# Patient Record
Sex: MALE | Race: WHITE | NOT HISPANIC OR LATINO
[De-identification: names, ages, dates, MRNs, and addresses within clinical notes are randomized per-mention and may not be internally consistent; named-entity substitution may affect disease eponyms.]

---

## 2017-01-09 ENCOUNTER — APPOINTMENT (OUTPATIENT)
Dept: OTOLARYNGOLOGY | Facility: CLINIC | Age: 72
End: 2017-01-09

## 2017-01-09 ENCOUNTER — APPOINTMENT (OUTPATIENT)
Dept: RADIATION ONCOLOGY | Facility: CLINIC | Age: 72
End: 2017-01-09

## 2017-01-09 VITALS
DIASTOLIC BLOOD PRESSURE: 68 MMHG | RESPIRATION RATE: 14 BRPM | HEART RATE: 60 BPM | OXYGEN SATURATION: 100 % | WEIGHT: 176 LBS | SYSTOLIC BLOOD PRESSURE: 139 MMHG | BODY MASS INDEX: 27.57 KG/M2

## 2017-01-09 DIAGNOSIS — Z78.9 OTHER SPECIFIED HEALTH STATUS: ICD-10-CM

## 2017-01-15 ENCOUNTER — EMERGENCY (EMERGENCY)
Facility: HOSPITAL | Age: 72
LOS: 1 days | Discharge: HOME | End: 2017-01-15
Attending: EMERGENCY MEDICINE | Admitting: EMERGENCY MEDICINE
Payer: MEDICARE

## 2017-01-15 VITALS
SYSTOLIC BLOOD PRESSURE: 123 MMHG | RESPIRATION RATE: 18 BRPM | OXYGEN SATURATION: 99 % | TEMPERATURE: 98 F | HEART RATE: 64 BPM | DIASTOLIC BLOOD PRESSURE: 51 MMHG

## 2017-01-15 VITALS
HEART RATE: 64 BPM | OXYGEN SATURATION: 97 % | WEIGHT: 177.03 LBS | RESPIRATION RATE: 18 BRPM | HEIGHT: 66 IN | DIASTOLIC BLOOD PRESSURE: 62 MMHG | TEMPERATURE: 98 F | SYSTOLIC BLOOD PRESSURE: 128 MMHG

## 2017-01-15 DIAGNOSIS — Z95.0 PRESENCE OF CARDIAC PACEMAKER: Chronic | ICD-10-CM

## 2017-01-15 DIAGNOSIS — E11.9 TYPE 2 DIABETES MELLITUS WITHOUT COMPLICATIONS: ICD-10-CM

## 2017-01-15 DIAGNOSIS — Z88.0 ALLERGY STATUS TO PENICILLIN: ICD-10-CM

## 2017-01-15 DIAGNOSIS — E86.0 DEHYDRATION: ICD-10-CM

## 2017-01-15 DIAGNOSIS — Z88.2 ALLERGY STATUS TO SULFONAMIDES: ICD-10-CM

## 2017-01-15 DIAGNOSIS — I10 ESSENTIAL (PRIMARY) HYPERTENSION: ICD-10-CM

## 2017-01-15 DIAGNOSIS — Z98.890 OTHER SPECIFIED POSTPROCEDURAL STATES: Chronic | ICD-10-CM

## 2017-01-15 LAB
CK MB CFR SERPL CALC: <1 NG/ML — SIGNIFICANT CHANGE UP (ref 0.5–3.6)
CK SERPL-CCNC: 84 U/L — SIGNIFICANT CHANGE UP (ref 39–308)
TROPONIN I SERPL-MCNC: <0.015 NG/ML — SIGNIFICANT CHANGE UP (ref 0.01–0.04)

## 2017-01-15 PROCEDURE — 70450 CT HEAD/BRAIN W/O DYE: CPT | Mod: 26

## 2017-01-15 PROCEDURE — 85730 THROMBOPLASTIN TIME PARTIAL: CPT

## 2017-01-15 PROCEDURE — 82553 CREATINE MB FRACTION: CPT

## 2017-01-15 PROCEDURE — 93010 ELECTROCARDIOGRAM REPORT: CPT

## 2017-01-15 PROCEDURE — 70481 CT ORBIT/EAR/FOSSA W/DYE: CPT

## 2017-01-15 PROCEDURE — 70481 CT ORBIT/EAR/FOSSA W/DYE: CPT | Mod: 26,59

## 2017-01-15 PROCEDURE — 70450 CT HEAD/BRAIN W/O DYE: CPT

## 2017-01-15 PROCEDURE — 99285 EMERGENCY DEPT VISIT HI MDM: CPT | Mod: 25

## 2017-01-15 PROCEDURE — 85610 PROTHROMBIN TIME: CPT

## 2017-01-15 PROCEDURE — 84484 ASSAY OF TROPONIN QUANT: CPT

## 2017-01-15 PROCEDURE — 36415 COLL VENOUS BLD VENIPUNCTURE: CPT

## 2017-01-15 PROCEDURE — 80053 COMPREHEN METABOLIC PANEL: CPT

## 2017-01-15 PROCEDURE — 99284 EMERGENCY DEPT VISIT MOD MDM: CPT | Mod: 25

## 2017-01-15 PROCEDURE — 85025 COMPLETE CBC W/AUTO DIFF WBC: CPT

## 2017-01-15 PROCEDURE — 82550 ASSAY OF CK (CPK): CPT

## 2017-01-15 PROCEDURE — 93005 ELECTROCARDIOGRAM TRACING: CPT

## 2017-01-15 RX ORDER — SODIUM CHLORIDE 9 MG/ML
1000 INJECTION INTRAMUSCULAR; INTRAVENOUS; SUBCUTANEOUS ONCE
Qty: 0 | Refills: 0 | Status: COMPLETED | OUTPATIENT
Start: 2017-01-15 | End: 2017-01-15

## 2017-01-15 RX ADMIN — SODIUM CHLORIDE 2000 MILLILITER(S): 9 INJECTION INTRAMUSCULAR; INTRAVENOUS; SUBCUTANEOUS at 14:00

## 2017-01-15 NOTE — CONSULT NOTE ADULT - SUBJECTIVE AND OBJECTIVE BOX
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 Laguna and Dr. Rodriguez who p/w episode of dizziness this am, felt like he was off balance, no syncope, no n/v. Last seen by Dr. Laguna on 1/9. Patient states that this has happened to him in the past and he has come to the ER for dehydration with resolution of his symptoms. He hasn't eaten in over 24 hours (usually eats once daily) and was feeling very weak and lightheaded. Works out several times a week. Denies vertigo, otalgia, otorrhea. No changes in hearing. Denies positional dizziness/vertigo. Was fluid resuscitated in the ER with 1L NS with resolution of his symptoms.     Allergies    penicillin (Unknown)  sulfa drugs (Unknown)    Intolerances        PAST MEDICAL & SURGICAL HISTORY:  Brain tumor  Artificial cardiac pacemaker  History of open heart surgery      SOCIAL HISTORY:  Tobacco History:  ETOH Use:   Drug Use:     FAMILY HISTORY:        MEDICATIONS:    Vital Signs Last 24 Hrs  T(C): 37.1, Max: 37.1 (01-15 @ 14:34)  T(F): 98.8, Max: 98.8 (01-15 @ 14:34)  HR: 68 (63 - 68)  BP: 132/70 (114/56 - 132/70)  BP(mean): --  RR: 18 (18 - 18)  SpO2: 98% (97% - 99%)    LABS:  CBC-    15 Fernando 2017 13:00    141    |  105    |  11     ----------------------------<  125    4.1     |  27     |  0.60     Ca    8.5        15 Fernando 2017 13:00    TPro  7.0    /  Alb  3.7    /  TBili  0.9    /  DBili  x      /  AST  24     /  ALT  28     /  AlkPhos  75     15 Fernando 2017 13:00    Coagulation Studies-  PT/INR - ( 15 Fernando 2017 13:00 )   PT: 29.6 sec;   INR: 2.64          PTT - ( 15 Fernando 2017 13:00 )  PTT:43.1 sec  Endocrine Panel-  --  --  8.5 mg/dL        PHYSICAL EXAM:    ENT EXAM-   NAD  Constitutional: Well-developed, well-nourished.  well appearing  face grossly intact  Head:  normocephalic, atraumatic.   Ears:  AU Ear canals clear.  Tympanic membranes intact; no effusion or retraction.  Eyes: EOMI, PERRL, no nystagmus   Pulm:  No dyspnea, non-labored breathing      RADIOLOGY & ADDITIONAL STUDIES:  Compared with 10/19/2016, there is no significant change in size or   distribution of known right glomus jugulare tumor, with erosion of the   medial hypotympanum as well as the posterior semicircular canal with both   intracranial and extracranial extension.    There is new partial right mastoid and tympanic cavity soft tissue which   is nonspecific, possibly inflammatory.

## 2017-01-15 NOTE — CONSULT NOTE ADULT - ASSESSMENT
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 Laguna and Dr. Rodriguez who p/w episode of dizziness this am. Now with resolution of symptoms after 1L NS.  -No acute ENT intervention.   -F/u with Dr. Laguna as originally planned sooner with any new concerns  -counselled on eating small meals throughout the day and staying hydrated to avoid dehydration    [facility] at [phone] with any questions/concerns.

## 2017-01-15 NOTE — ED ADULT NURSE NOTE - OBJECTIVE STATEMENT
Pt presents to ED c/o dizziness and nausea starting 1 hour ago. Pt states he has hx of a brain tumor dx 3 mo ago, last radiation 6 weeks ago. Pt denies CP/SOB, palpitations, fevers, v/d. Pt speaking in complete sentences, lungs clear to auscultation. Neuro intact, no arm/leg drift, no facial droop. Abdomen soft, nontende, bowel sounds present in all 4 quadrants. NAD noted.

## 2017-01-15 NOTE — ED PROVIDER NOTE - CONDUCTED A DETAILED DISCUSSION WITH PATIENT AND/OR GUARDIAN REGARDING, MDM
radiology results/need for outpatient follow-up/lab results/return to ED if symptoms worsen, persist or questions arise

## 2017-01-15 NOTE — ED PROVIDER NOTE - NEUROLOGICAL, MLM
Alert and oriented x 3. Cn 2-12 intact. Strength 5/5 and sensation intact in all 4 extremities. no pronator drift. FTN normal. Neg Rhomberg. Gait normal.

## 2017-01-15 NOTE — ED PROVIDER NOTE - MEDICAL DECISION MAKING DETAILS
71M with above PMHx including recently dx brain tumor who p/w dizziness, no resolved. pt with stable VS but appears dehydrated, will check labs, CT head WO, CT IAC W, and hydrate. Dispo pending w/u.

## 2017-01-15 NOTE — ED ADULT NURSE NOTE - CHPI ED SYMPTOMS NEG
no decreased eating/drinking/no weakness/no tingling/no numbness/no vomiting/no pain/no chills/no fever

## 2017-01-15 NOTE — ED PROVIDER NOTE - OBJECTIVE STATEMENT
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 Laguna (ENT) and Dr. 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 Laguna (ENT) and Dr. Rodriguez ([facility]) who p/w episode of dizziness this am, felt like he was off balance, no syncope, no n/v, no CP/SOB, no n/t/w in ext. Pt admits he has not been staying hydrated lately.

## 2017-01-15 NOTE — ED PROVIDER NOTE - PROGRESS NOTE DETAILS
Pt feels much better after iVFs, ambulatory no neuro deficits. CTs unremarkable, mass shows no change. pt requests c/s with Dr. Rodriguez - she was paged but have not heard back fro her yet. pt will f/u with his doctors tomorrow or return for any concerning or worsening sx.

## 2017-01-17 ENCOUNTER — APPOINTMENT (OUTPATIENT)
Dept: OPHTHALMOLOGY | Facility: CLINIC | Age: 72
End: 2017-01-17

## 2017-01-27 ENCOUNTER — CLINICAL ADVICE (OUTPATIENT)
Age: 72
End: 2017-01-27

## 2017-02-16 PROBLEM — D49.6 NEOPLASM OF UNSPECIFIED BEHAVIOR OF BRAIN: Chronic | Status: ACTIVE | Noted: 2017-01-15

## 2017-03-03 ENCOUNTER — APPOINTMENT (OUTPATIENT)
Dept: OTOLARYNGOLOGY | Facility: CLINIC | Age: 72
End: 2017-03-03

## 2017-03-03 ENCOUNTER — APPOINTMENT (OUTPATIENT)
Dept: RADIATION ONCOLOGY | Facility: CLINIC | Age: 72
End: 2017-03-03

## 2017-03-03 VITALS
RESPIRATION RATE: 14 BRPM | SYSTOLIC BLOOD PRESSURE: 141 MMHG | DIASTOLIC BLOOD PRESSURE: 77 MMHG | BODY MASS INDEX: 26.78 KG/M2 | WEIGHT: 171 LBS | HEART RATE: 68 BPM | OXYGEN SATURATION: 100 %

## 2017-03-06 ENCOUNTER — CLINICAL ADVICE (OUTPATIENT)
Age: 72
End: 2017-03-06

## 2017-03-14 ENCOUNTER — FORM ENCOUNTER (OUTPATIENT)
Age: 72
End: 2017-03-14

## 2017-03-15 ENCOUNTER — OUTPATIENT (OUTPATIENT)
Dept: OUTPATIENT SERVICES | Facility: HOSPITAL | Age: 72
LOS: 1 days | End: 2017-03-15
Payer: MEDICARE

## 2017-03-15 DIAGNOSIS — Z98.890 OTHER SPECIFIED POSTPROCEDURAL STATES: Chronic | ICD-10-CM

## 2017-03-15 DIAGNOSIS — Z95.0 PRESENCE OF CARDIAC PACEMAKER: Chronic | ICD-10-CM

## 2017-03-15 PROCEDURE — 70481 CT ORBIT/EAR/FOSSA W/DYE: CPT | Mod: 26

## 2017-03-15 PROCEDURE — 70481 CT ORBIT/EAR/FOSSA W/DYE: CPT

## 2017-03-24 ENCOUNTER — APPOINTMENT (OUTPATIENT)
Dept: OTOLARYNGOLOGY | Facility: CLINIC | Age: 72
End: 2017-03-24

## 2017-03-24 VITALS
HEART RATE: 61 BPM | DIASTOLIC BLOOD PRESSURE: 60 MMHG | HEIGHT: 67 IN | SYSTOLIC BLOOD PRESSURE: 124 MMHG | WEIGHT: 172 LBS | BODY MASS INDEX: 27 KG/M2

## 2017-03-24 DIAGNOSIS — H90.3 SENSORINEURAL HEARING LOSS, BILATERAL: ICD-10-CM

## 2017-03-24 DIAGNOSIS — R49.8 OTHER VOICE AND RESONANCE DISORDERS: ICD-10-CM

## 2017-03-24 DIAGNOSIS — K21.0 GASTRO-ESOPHAGEAL REFLUX DISEASE WITH ESOPHAGITIS: ICD-10-CM

## 2017-03-24 DIAGNOSIS — N40.0 BENIGN PROSTATIC HYPERPLASIA WITHOUT LOWER URINARY TRACT SYMPMS: ICD-10-CM

## 2017-03-24 DIAGNOSIS — G47.33 OBSTRUCTIVE SLEEP APNEA (ADULT) (PEDIATRIC): ICD-10-CM

## 2017-03-24 DIAGNOSIS — H40.9 UNSPECIFIED GLAUCOMA: ICD-10-CM

## 2017-03-27 PROBLEM — N40.0 BENIGN HYPERTROPHY OF PROSTATE: Status: ACTIVE | Noted: 2017-03-27

## 2017-03-27 PROBLEM — R49.8 OTHER VOICE AND RESONANCE DISORDERS: Status: ACTIVE | Noted: 2017-03-27

## 2017-03-27 PROBLEM — H90.3 BILATERAL SENSORINEURAL HEARING LOSS: Status: ACTIVE | Noted: 2017-03-27

## 2017-03-27 PROBLEM — G47.33 OBSTRUCTIVE SLEEP APNEA: Status: ACTIVE | Noted: 2017-03-27

## 2017-03-27 PROBLEM — H40.9 GLAUCOMA: Status: ACTIVE | Noted: 2017-03-27

## 2017-03-27 PROBLEM — K21.0 CHRONIC REFLUX ESOPHAGITIS: Status: ACTIVE | Noted: 2017-03-27

## 2017-03-28 ENCOUNTER — APPOINTMENT (OUTPATIENT)
Dept: RADIATION ONCOLOGY | Facility: CLINIC | Age: 72
End: 2017-03-28

## 2017-03-28 VITALS
BODY MASS INDEX: 27.31 KG/M2 | SYSTOLIC BLOOD PRESSURE: 150 MMHG | HEIGHT: 67 IN | WEIGHT: 174 LBS | DIASTOLIC BLOOD PRESSURE: 79 MMHG | HEART RATE: 69 BPM | OXYGEN SATURATION: 99 %

## 2017-03-28 DIAGNOSIS — H93.11 TINNITUS, RIGHT EAR: ICD-10-CM

## 2017-04-27 ENCOUNTER — OUTPATIENT (OUTPATIENT)
Dept: OUTPATIENT SERVICES | Facility: HOSPITAL | Age: 72
LOS: 1 days | Discharge: HOME | End: 2017-04-27
Payer: MEDICARE

## 2017-04-27 DIAGNOSIS — Z98.890 OTHER SPECIFIED POSTPROCEDURAL STATES: Chronic | ICD-10-CM

## 2017-04-27 DIAGNOSIS — I48.91 UNSPECIFIED ATRIAL FIBRILLATION: ICD-10-CM

## 2017-04-27 DIAGNOSIS — I44.2 ATRIOVENTRICULAR BLOCK, COMPLETE: ICD-10-CM

## 2017-04-27 DIAGNOSIS — Z95.0 PRESENCE OF CARDIAC PACEMAKER: Chronic | ICD-10-CM

## 2017-04-27 DIAGNOSIS — Z79.01 LONG TERM (CURRENT) USE OF ANTICOAGULANTS: ICD-10-CM

## 2017-04-27 DIAGNOSIS — Z45.010 ENCOUNTER FOR CHECKING AND TESTING OF CARDIAC PACEMAKER PULSE GENERATOR [BATTERY]: ICD-10-CM

## 2017-04-27 PROCEDURE — C1786: CPT

## 2017-04-27 PROCEDURE — 33227 REMOVE&REPLACE PM GEN SINGL: CPT | Mod: KX

## 2017-04-27 NOTE — PROGRESS NOTE ADULT - SUBJECTIVE AND OBJECTIVE BOX
EPS Progress Note    S: 70 yo m with history of a.fib. brain tumor, s/p ppm for CHB presents for ppm generator change.  Patient denies chest pain, SOB, palpitations,     MEDICATIONS    Metoprolol, finasteride, digoxin, coumadin, doxazosin, omeprazole, pantoprazole.             General:  NAD         Chest:  CTA B/L         Cardiac:       s1/s2        Irregular      Abdomen:  +BS      Soft      Non-Tender      Non-Distended         Extremities:  LE Edema      Distal Pulses Intact                                        Assessment/Plan:    70 yo m with history of a.fib. brain tumor, s/p ppm for CHB presents for ppm generator change.  will discharge home today if stable.

## 2017-06-17 ENCOUNTER — EMERGENCY (EMERGENCY)
Facility: HOSPITAL | Age: 72
LOS: 1 days | Discharge: HOME | End: 2017-06-17
Attending: EMERGENCY MEDICINE | Admitting: EMERGENCY MEDICINE
Payer: MEDICARE

## 2017-06-17 VITALS
HEART RATE: 64 BPM | OXYGEN SATURATION: 95 % | TEMPERATURE: 98 F | SYSTOLIC BLOOD PRESSURE: 136 MMHG | DIASTOLIC BLOOD PRESSURE: 74 MMHG | RESPIRATION RATE: 18 BRPM

## 2017-06-17 VITALS
DIASTOLIC BLOOD PRESSURE: 69 MMHG | RESPIRATION RATE: 18 BRPM | HEIGHT: 67 IN | OXYGEN SATURATION: 96 % | TEMPERATURE: 99 F | HEART RATE: 62 BPM | SYSTOLIC BLOOD PRESSURE: 137 MMHG | WEIGHT: 169.98 LBS

## 2017-06-17 DIAGNOSIS — Z88.2 ALLERGY STATUS TO SULFONAMIDES: ICD-10-CM

## 2017-06-17 DIAGNOSIS — R42 DIZZINESS AND GIDDINESS: ICD-10-CM

## 2017-06-17 DIAGNOSIS — I10 ESSENTIAL (PRIMARY) HYPERTENSION: ICD-10-CM

## 2017-06-17 DIAGNOSIS — Z95.0 PRESENCE OF CARDIAC PACEMAKER: Chronic | ICD-10-CM

## 2017-06-17 DIAGNOSIS — Z88.0 ALLERGY STATUS TO PENICILLIN: ICD-10-CM

## 2017-06-17 DIAGNOSIS — Z98.890 OTHER SPECIFIED POSTPROCEDURAL STATES: Chronic | ICD-10-CM

## 2017-06-17 PROCEDURE — 70498 CT ANGIOGRAPHY NECK: CPT

## 2017-06-17 PROCEDURE — 96374 THER/PROPH/DIAG INJ IV PUSH: CPT | Mod: XU

## 2017-06-17 PROCEDURE — 70498 CT ANGIOGRAPHY NECK: CPT | Mod: 26

## 2017-06-17 PROCEDURE — 99285 EMERGENCY DEPT VISIT HI MDM: CPT | Mod: 25

## 2017-06-17 PROCEDURE — 82550 ASSAY OF CK (CPK): CPT

## 2017-06-17 PROCEDURE — 70496 CT ANGIOGRAPHY HEAD: CPT | Mod: 26

## 2017-06-17 PROCEDURE — 36415 COLL VENOUS BLD VENIPUNCTURE: CPT

## 2017-06-17 PROCEDURE — 99284 EMERGENCY DEPT VISIT MOD MDM: CPT | Mod: 25

## 2017-06-17 PROCEDURE — 93005 ELECTROCARDIOGRAM TRACING: CPT

## 2017-06-17 PROCEDURE — 85730 THROMBOPLASTIN TIME PARTIAL: CPT

## 2017-06-17 PROCEDURE — 93010 ELECTROCARDIOGRAM REPORT: CPT

## 2017-06-17 PROCEDURE — 80053 COMPREHEN METABOLIC PANEL: CPT

## 2017-06-17 PROCEDURE — 85610 PROTHROMBIN TIME: CPT

## 2017-06-17 PROCEDURE — 70496 CT ANGIOGRAPHY HEAD: CPT

## 2017-06-17 PROCEDURE — 84484 ASSAY OF TROPONIN QUANT: CPT

## 2017-06-17 PROCEDURE — 82553 CREATINE MB FRACTION: CPT

## 2017-06-17 PROCEDURE — 85025 COMPLETE CBC W/AUTO DIFF WBC: CPT

## 2017-06-17 PROCEDURE — 70450 CT HEAD/BRAIN W/O DYE: CPT

## 2017-06-17 RX ORDER — DIAZEPAM 5 MG
1 TABLET ORAL
Qty: 8 | Refills: 0 | OUTPATIENT
Start: 2017-06-17

## 2017-06-17 RX ORDER — METOCLOPRAMIDE HCL 10 MG
1 TABLET ORAL
Qty: 15 | Refills: 0 | OUTPATIENT
Start: 2017-06-17

## 2017-06-17 RX ORDER — DIAZEPAM 5 MG
5 TABLET ORAL ONCE
Qty: 0 | Refills: 0 | Status: DISCONTINUED | OUTPATIENT
Start: 2017-06-17 | End: 2017-06-17

## 2017-06-17 RX ORDER — METOCLOPRAMIDE HCL 10 MG
10 TABLET ORAL ONCE
Qty: 0 | Refills: 0 | Status: COMPLETED | OUTPATIENT
Start: 2017-06-17 | End: 2017-06-17

## 2017-06-17 RX ADMIN — Medication 5 MILLIGRAM(S): at 13:33

## 2017-06-17 RX ADMIN — Medication 10 MILLIGRAM(S): at 13:34

## 2017-06-17 NOTE — ED PROVIDER NOTE - PHYSICAL EXAMINATION
CONSTITUTIONAL: Well appearing, well nourished, awake, alert and in no apparent distress.  HEENT: Head is atraumatic. Eyes clear bilaterally, normal EOMI. Airway patent.  CARDIAC: Normal rate, regular rhythm.  Heart sounds S1, S2.   RESPIRATORY: Breath sounds clear and equal bilaterally.  GASTROINTESTINAL: Abdomen soft, non-tender, no guarding.  MUSCULOSKELETAL: Spine appears normal, range of motion is not limited, no muscle or joint tenderness.   NEUROLOGICAL: Alert and oriented, no focal deficits, no motor or sensory deficits.  SKIN: Skin normal color for race, warm, dry and intact. No evidence of rash.  PSYCHIATRIC: Alert and oriented to person, place, time/situation. normal mood and affect. no apparent risk to self or others.    Patient is alert, oriented x person, place and time.  Cranial nerves 2-12 are intact.  Normal gait and speech.  Cerebellar testing normal:  negative Romberg, normal coordination and normal finger to nose, heal to shin and rapid alternating movements.  Normal proprioception and sensory exam.  No pronator drift.  5/5 bl upper extremity and lower extremity strength.    lateral nystagmus towards left, no vertical nystagmus

## 2017-06-17 NOTE — ED ADULT NURSE REASSESSMENT NOTE - NS ED NURSE REASSESS COMMENT FT1
PT received DC instructions by MD Ibarra.  Pt wanted to leave ED and left ED before signing DC papers.  Pt alert and oriented x3. PT ambulated for MD Ibarra and MEGHAN Weldon. Pt denies imbalance or dizziness.  Even gait.

## 2017-06-17 NOTE — ED ADULT TRIAGE NOTE - CHIEF COMPLAINT QUOTE
" I had evening exercise and after I started to feel dizzy and the room was spinning with nausea. I still feel dizzy. Hx of brain tumor"

## 2017-06-17 NOTE — ED ADULT NURSE NOTE - CHPI ED SYMPTOMS NEG
no fever/no confusion/no numbness/no change in level of consciousness/no vomiting/no blurred vision/no weakness/no loss of consciousness

## 2017-06-17 NOTE — ED PROVIDER NOTE - OBJECTIVE STATEMENT
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 States was concerned regarding the tumor which prompted visit, no cp, sob. symptoms have occurred before but this time symptoms did not improve with IV fluids. 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 States was concerned regarding the tumor which prompted visit, no cp, sob. symptoms have occurred before but this time symptoms did not improve too much with oral fluids. having intercourse after which symptoms started.

## 2017-06-17 NOTE — ED PROVIDER NOTE - CHPI ED SYMPTOMS NEG
no shortness of breath/no syncope/no chills/no chest pain/no cough/no nausea/no back pain/no fever/no diaphoresis

## 2017-06-17 NOTE — ED PROVIDER NOTE - MEDICAL DECISION MAKING DETAILS
s/s as above, initially stated swaying, given hx of brain tumor near posterior region, imaging obtained, also cta to eval for posterior circulation. meds given w improvement, states improved, ambulating- discussed [facility] doctor covering for Dr. Ashraf, will fu on 23rd, return precautions given. s/s as above, given hx of brain tumor near posterior region, imaging obtained, also cta to eval for posterior circulation. meds given w improvement, states improved, ambulating without issue- discussed [facility] doctor covering for Dr. Ashraf, will fu on 23rd, return precautions given.

## 2017-06-17 NOTE — ED ADULT NURSE NOTE - OBJECTIVE STATEMENT
Pt came to ED complaining of dizziness and vertigo with nausea for several days. Pt has hx of brain tumor and has intermittent episodes of vertigo. Pt reports episodes got worse yesterday and wasn't relieved. Pt is alert and oriented x3, negative results on [place] Stroke Scale. Positive PMS x4 extremities. Ambulatory with even gait.  Pt denies all other medical complaints at this time. Denies chest pain, SOB, abd pain, /GI symptoms.

## 2017-06-22 ENCOUNTER — FORM ENCOUNTER (OUTPATIENT)
Age: 72
End: 2017-06-22

## 2017-06-23 ENCOUNTER — OUTPATIENT (OUTPATIENT)
Dept: OUTPATIENT SERVICES | Facility: HOSPITAL | Age: 72
LOS: 1 days | End: 2017-06-23
Payer: MEDICARE

## 2017-06-23 DIAGNOSIS — Z95.0 PRESENCE OF CARDIAC PACEMAKER: Chronic | ICD-10-CM

## 2017-06-23 DIAGNOSIS — Z98.890 OTHER SPECIFIED POSTPROCEDURAL STATES: Chronic | ICD-10-CM

## 2017-06-23 PROCEDURE — 70481 CT ORBIT/EAR/FOSSA W/DYE: CPT | Mod: 26

## 2017-06-23 PROCEDURE — 70481 CT ORBIT/EAR/FOSSA W/DYE: CPT

## 2017-06-27 ENCOUNTER — APPOINTMENT (OUTPATIENT)
Dept: RADIATION ONCOLOGY | Facility: CLINIC | Age: 72
End: 2017-06-27

## 2017-06-27 VITALS
RESPIRATION RATE: 16 BRPM | HEART RATE: 97 BPM | DIASTOLIC BLOOD PRESSURE: 71 MMHG | SYSTOLIC BLOOD PRESSURE: 144 MMHG | BODY MASS INDEX: 26.94 KG/M2 | TEMPERATURE: 97.6 F | OXYGEN SATURATION: 97 % | WEIGHT: 172 LBS

## 2017-07-17 ENCOUNTER — APPOINTMENT (OUTPATIENT)
Dept: OTOLARYNGOLOGY | Facility: CLINIC | Age: 72
End: 2017-07-17

## 2017-07-17 VITALS
HEIGHT: 67 IN | BODY MASS INDEX: 26.53 KG/M2 | DIASTOLIC BLOOD PRESSURE: 70 MMHG | TEMPERATURE: 99.3 F | HEART RATE: 60 BPM | WEIGHT: 169 LBS | SYSTOLIC BLOOD PRESSURE: 123 MMHG

## 2017-07-20 ENCOUNTER — APPOINTMENT (OUTPATIENT)
Dept: OTOLARYNGOLOGY | Facility: CLINIC | Age: 72
End: 2017-07-20

## 2017-07-20 DIAGNOSIS — D49.9 NEOPLASM OF UNSPECIFIED BEHAVIOR OF UNSPECIFIED SITE: ICD-10-CM

## 2017-08-23 ENCOUNTER — APPOINTMENT (OUTPATIENT)
Dept: OTOLARYNGOLOGY | Facility: CLINIC | Age: 72
End: 2017-08-23
Payer: MEDICARE

## 2017-08-23 VITALS — HEART RATE: 59 BPM | OXYGEN SATURATION: 96 % | DIASTOLIC BLOOD PRESSURE: 78 MMHG | SYSTOLIC BLOOD PRESSURE: 138 MMHG

## 2017-08-23 PROCEDURE — 99214 OFFICE O/P EST MOD 30 MIN: CPT

## 2017-10-02 ENCOUNTER — APPOINTMENT (OUTPATIENT)
Dept: OTOLARYNGOLOGY | Facility: CLINIC | Age: 72
End: 2017-10-02

## 2017-11-02 ENCOUNTER — APPOINTMENT (OUTPATIENT)
Dept: OPHTHALMOLOGY | Facility: CLINIC | Age: 72
End: 2017-11-02
Payer: MEDICARE

## 2017-11-02 PROCEDURE — 92014 COMPRE OPH EXAM EST PT 1/>: CPT

## 2017-11-02 PROCEDURE — 92083 EXTENDED VISUAL FIELD XM: CPT

## 2017-11-15 ENCOUNTER — APPOINTMENT (OUTPATIENT)
Dept: OTOLARYNGOLOGY | Facility: CLINIC | Age: 72
End: 2017-11-15
Payer: MEDICARE

## 2017-11-15 VITALS
DIASTOLIC BLOOD PRESSURE: 80 MMHG | SYSTOLIC BLOOD PRESSURE: 144 MMHG | HEART RATE: 70 BPM | OXYGEN SATURATION: 95 % | TEMPERATURE: 98.1 F

## 2017-11-15 DIAGNOSIS — H90.41 SENSORINEURAL HEARING LOSS, UNILATERAL, RIGHT EAR, WITH UNRESTRICTED HEARING ON THE CONTRALATERAL SIDE: ICD-10-CM

## 2017-11-15 PROCEDURE — 99214 OFFICE O/P EST MOD 30 MIN: CPT

## 2017-11-16 ENCOUNTER — EMERGENCY (EMERGENCY)
Facility: HOSPITAL | Age: 72
LOS: 1 days | Discharge: HOME | End: 2017-11-16
Attending: EMERGENCY MEDICINE | Admitting: EMERGENCY MEDICINE
Payer: MEDICARE

## 2017-11-16 VITALS
SYSTOLIC BLOOD PRESSURE: 169 MMHG | HEART RATE: 66 BPM | TEMPERATURE: 97 F | DIASTOLIC BLOOD PRESSURE: 76 MMHG | WEIGHT: 169.98 LBS | OXYGEN SATURATION: 97 % | RESPIRATION RATE: 18 BRPM

## 2017-11-16 VITALS
HEART RATE: 62 BPM | OXYGEN SATURATION: 98 % | SYSTOLIC BLOOD PRESSURE: 140 MMHG | RESPIRATION RATE: 18 BRPM | DIASTOLIC BLOOD PRESSURE: 62 MMHG | TEMPERATURE: 98 F

## 2017-11-16 DIAGNOSIS — Z79.899 OTHER LONG TERM (CURRENT) DRUG THERAPY: ICD-10-CM

## 2017-11-16 DIAGNOSIS — R51 HEADACHE: ICD-10-CM

## 2017-11-16 DIAGNOSIS — Z79.01 LONG TERM (CURRENT) USE OF ANTICOAGULANTS: ICD-10-CM

## 2017-11-16 DIAGNOSIS — Z88.2 ALLERGY STATUS TO SULFONAMIDES: ICD-10-CM

## 2017-11-16 DIAGNOSIS — I10 ESSENTIAL (PRIMARY) HYPERTENSION: ICD-10-CM

## 2017-11-16 DIAGNOSIS — Z98.890 OTHER SPECIFIED POSTPROCEDURAL STATES: Chronic | ICD-10-CM

## 2017-11-16 DIAGNOSIS — Z95.0 PRESENCE OF CARDIAC PACEMAKER: Chronic | ICD-10-CM

## 2017-11-16 DIAGNOSIS — Z88.0 ALLERGY STATUS TO PENICILLIN: ICD-10-CM

## 2017-11-16 LAB
ALBUMIN SERPL ELPH-MCNC: 4.2 G/DL — SIGNIFICANT CHANGE UP (ref 3.3–5)
ALP SERPL-CCNC: 92 U/L — SIGNIFICANT CHANGE UP (ref 40–120)
ALT FLD-CCNC: 31 U/L — SIGNIFICANT CHANGE UP (ref 10–45)
ANION GAP SERPL CALC-SCNC: 13 MMOL/L — SIGNIFICANT CHANGE UP (ref 5–17)
APTT BLD: 41.2 SEC — HIGH (ref 27.5–37.4)
AST SERPL-CCNC: 26 U/L — SIGNIFICANT CHANGE UP (ref 10–40)
BASOPHILS NFR BLD AUTO: 0.1 % — SIGNIFICANT CHANGE UP (ref 0–2)
BILIRUB SERPL-MCNC: 0.5 MG/DL — SIGNIFICANT CHANGE UP (ref 0.2–1.2)
BUN SERPL-MCNC: 17 MG/DL — SIGNIFICANT CHANGE UP (ref 7–23)
CALCIUM SERPL-MCNC: 10.2 MG/DL — SIGNIFICANT CHANGE UP (ref 8.4–10.5)
CHLORIDE SERPL-SCNC: 100 MMOL/L — SIGNIFICANT CHANGE UP (ref 96–108)
CO2 SERPL-SCNC: 27 MMOL/L — SIGNIFICANT CHANGE UP (ref 22–31)
CREAT SERPL-MCNC: 0.79 MG/DL — SIGNIFICANT CHANGE UP (ref 0.5–1.3)
EOSINOPHIL NFR BLD AUTO: 0.4 % — SIGNIFICANT CHANGE UP (ref 0–6)
EXTRA SST TUBE: SIGNIFICANT CHANGE UP
GLUCOSE SERPL-MCNC: 147 MG/DL — HIGH (ref 70–99)
HCT VFR BLD CALC: 42 % — SIGNIFICANT CHANGE UP (ref 39–50)
HGB BLD-MCNC: 14.3 G/DL — SIGNIFICANT CHANGE UP (ref 13–17)
INR BLD: 1.8 — HIGH (ref 0.88–1.16)
LYMPHOCYTES # BLD AUTO: 28 % — SIGNIFICANT CHANGE UP (ref 13–44)
MCHC RBC-ENTMCNC: 33.3 PG — SIGNIFICANT CHANGE UP (ref 27–34)
MCHC RBC-ENTMCNC: 34 G/DL — SIGNIFICANT CHANGE UP (ref 32–36)
MCV RBC AUTO: 97.7 FL — SIGNIFICANT CHANGE UP (ref 80–100)
MONOCYTES NFR BLD AUTO: 8.4 % — SIGNIFICANT CHANGE UP (ref 2–14)
NEUTROPHILS NFR BLD AUTO: 63.1 % — SIGNIFICANT CHANGE UP (ref 43–77)
PLATELET # BLD AUTO: 79 K/UL — LOW (ref 150–400)
POTASSIUM SERPL-MCNC: 4.8 MMOL/L — SIGNIFICANT CHANGE UP (ref 3.5–5.3)
POTASSIUM SERPL-SCNC: 4.8 MMOL/L — SIGNIFICANT CHANGE UP (ref 3.5–5.3)
PROT SERPL-MCNC: 7.4 G/DL — SIGNIFICANT CHANGE UP (ref 6–8.3)
PROTHROM AB SERPL-ACNC: 20.2 SEC — HIGH (ref 9.8–12.7)
RBC # BLD: 4.3 M/UL — SIGNIFICANT CHANGE UP (ref 4.2–5.8)
RBC # FLD: 14.5 % — SIGNIFICANT CHANGE UP (ref 10.3–16.9)
SODIUM SERPL-SCNC: 140 MMOL/L — SIGNIFICANT CHANGE UP (ref 135–145)
WBC # BLD: 6.8 K/UL — SIGNIFICANT CHANGE UP (ref 3.8–10.5)
WBC # FLD AUTO: 6.8 K/UL — SIGNIFICANT CHANGE UP (ref 3.8–10.5)

## 2017-11-16 PROCEDURE — 70496 CT ANGIOGRAPHY HEAD: CPT

## 2017-11-16 PROCEDURE — 85610 PROTHROMBIN TIME: CPT

## 2017-11-16 PROCEDURE — 80053 COMPREHEN METABOLIC PANEL: CPT

## 2017-11-16 PROCEDURE — 85730 THROMBOPLASTIN TIME PARTIAL: CPT

## 2017-11-16 PROCEDURE — 93005 ELECTROCARDIOGRAM TRACING: CPT

## 2017-11-16 PROCEDURE — 70450 CT HEAD/BRAIN W/O DYE: CPT

## 2017-11-16 PROCEDURE — 36415 COLL VENOUS BLD VENIPUNCTURE: CPT

## 2017-11-16 PROCEDURE — 85025 COMPLETE CBC W/AUTO DIFF WBC: CPT

## 2017-11-16 PROCEDURE — 70496 CT ANGIOGRAPHY HEAD: CPT | Mod: 26

## 2017-11-16 PROCEDURE — 99284 EMERGENCY DEPT VISIT MOD MDM: CPT

## 2017-11-16 PROCEDURE — 99284 EMERGENCY DEPT VISIT MOD MDM: CPT | Mod: 25

## 2017-11-16 RX ORDER — METOPROLOL TARTRATE 50 MG
0 TABLET ORAL
Qty: 0 | Refills: 0 | COMMUNITY

## 2017-11-16 RX ORDER — OMEPRAZOLE 10 MG/1
0 CAPSULE, DELAYED RELEASE ORAL
Qty: 0 | Refills: 0 | COMMUNITY

## 2017-11-16 RX ORDER — DIGOXIN 250 MCG
0 TABLET ORAL
Qty: 0 | Refills: 0 | COMMUNITY

## 2017-11-16 RX ORDER — PANTOPRAZOLE SODIUM 20 MG/1
1 TABLET, DELAYED RELEASE ORAL
Qty: 0 | Refills: 0 | COMMUNITY

## 2017-11-16 RX ORDER — WARFARIN SODIUM 2.5 MG/1
1 TABLET ORAL
Qty: 0 | Refills: 0 | COMMUNITY

## 2017-11-16 NOTE — ED ADULT NURSE NOTE - OBJECTIVE STATEMENT
Patient comes in ambulatory into bed #2 complaining of a constant dull 5/10 occipital headache that started last night. Patient reports, "I was having a relationship with my wife and I felt like the room was spinning and I was nauseous." Prior tx Tylenol not effective. Denies any dizziness or nausea today. Patient has a brain tumor with multiple rounds of radiation, last treatment was in December. Patient is on coumadin but hold dose today.

## 2017-11-16 NOTE — ED PROVIDER NOTE - MEDICAL DECISION MAKING DETAILS
pt with headache hx of glomus jugular tumor - no acute neuro deficits, imaging in ED neg for bleed, shows decrease in size, pt feels better in ED with medicine intervention, ENT notified who discussed with Dr. Salvador who is ok with discharge

## 2017-11-16 NOTE — ED ADULT NURSE NOTE - CHPI ED SYMPTOMS NEG
denies any CP, SOB, syncopal episodes./no change in level of consciousness/no fever/no vomiting/no confusion/no blurred vision/no loss of consciousness/no numbness

## 2017-11-16 NOTE — ED PROVIDER NOTE - OBJECTIVE STATEMENT
73 y/o m with PMH of right glomus jugulare tumor, BPH, HTN, GERD, PM presents to ED with 5/10 headache starting after sexual activity night prior.  Pt states he sometimes has headache and vertigo with tumor but not since year prior.  Last radiation 12/16.  Currently denies dizziness.  No fever, no chills.

## 2017-12-04 ENCOUNTER — APPOINTMENT (OUTPATIENT)
Dept: RADIATION ONCOLOGY | Facility: CLINIC | Age: 72
End: 2017-12-04
Payer: MEDICARE

## 2017-12-04 VITALS
HEART RATE: 63 BPM | BODY MASS INDEX: 27.41 KG/M2 | OXYGEN SATURATION: 95 % | DIASTOLIC BLOOD PRESSURE: 70 MMHG | WEIGHT: 175 LBS | SYSTOLIC BLOOD PRESSURE: 144 MMHG

## 2017-12-04 VITALS — SYSTOLIC BLOOD PRESSURE: 149 MMHG | OXYGEN SATURATION: 95 % | HEART RATE: 80 BPM | DIASTOLIC BLOOD PRESSURE: 71 MMHG

## 2017-12-04 PROCEDURE — 99214 OFFICE O/P EST MOD 30 MIN: CPT

## 2017-12-06 ENCOUNTER — APPOINTMENT (OUTPATIENT)
Dept: OTOLARYNGOLOGY | Facility: CLINIC | Age: 72
End: 2017-12-06

## 2017-12-13 ENCOUNTER — APPOINTMENT (OUTPATIENT)
Dept: OTOLARYNGOLOGY | Facility: CLINIC | Age: 72
End: 2017-12-13
Payer: MEDICARE

## 2017-12-13 VITALS
HEART RATE: 69 BPM | DIASTOLIC BLOOD PRESSURE: 70 MMHG | OXYGEN SATURATION: 95 % | SYSTOLIC BLOOD PRESSURE: 146 MMHG | TEMPERATURE: 98.7 F

## 2017-12-13 PROCEDURE — 99212 OFFICE O/P EST SF 10 MIN: CPT

## 2018-03-02 NOTE — ED ADULT NURSE NOTE - NS ED NOTE  TALK SOMEONE YN
HPI:   Allyssa Crain is a 79year old female who presents for a Medicare Subsequent Annual Wellness visit (Pt already had Initial Annual Wellness).       Her last annual assessment has been over 1 year: Annual Physical due on 11/14/1937         Fall/ TRIG       Last Chemistry Labs:   No results found for: AST, ALT, CA, ALB, TSH, CREATSERUM, GLU     CBC  (most recent labs)   No results found for: WBC, HGB, PLT     ALLERGIES:   She has No Known Allergies.     CURRENT MEDICATIONS:     Outpatient Prescripti Psychiatric/Behavioral: Negative. Pertinent items are noted in HPI.    EXAM:   /75   Pulse 78   Temp 98 °F (36.7 °C) (Oral)   Resp 18   Ht 4' 9\" (1.448 m)   Wt 94 lb (42.6 kg)   BMI 20.34 kg/m²  Estimated body mass index is 20.34 kg/m² as aleta immunization history on file for this patient. ASSESSMENT AND OTHER RELEVANT CHRONIC CONDITIONS:   Narendra Beckett is a 79year old female who presents for a Medicare Assessment. PLAN SUMMARY:   There are no diagnoses linked to this encounter. No results found for: FOB No flowsheet data found. Glaucoma Screening      Ophthalmology Visit Annually: Diabetics, FHx Glaucoma, AA>50, > 65 No flowsheet data found.     Bone Density Screening      Dexascan Every two years No results found for t , she is refusing       SPECIFIC DISEASE MONITORING Internal Lab or Procedure External Lab or Procedure      Annual Monitoring of Persistent     Medications (ACE/ARB, digoxin diuretics, anticonvulsants.)    Potassium  Annually No results found for: K No fl No

## 2018-03-06 ENCOUNTER — APPOINTMENT (OUTPATIENT)
Dept: OPHTHALMOLOGY | Facility: CLINIC | Age: 73
End: 2018-03-06

## 2018-03-06 ENCOUNTER — APPOINTMENT (OUTPATIENT)
Dept: OTOLARYNGOLOGY | Facility: CLINIC | Age: 73
End: 2018-03-06
Payer: MEDICARE

## 2018-03-06 VITALS
TEMPERATURE: 98.2 F | HEART RATE: 64 BPM | OXYGEN SATURATION: 96 % | SYSTOLIC BLOOD PRESSURE: 143 MMHG | DIASTOLIC BLOOD PRESSURE: 73 MMHG

## 2018-03-06 DIAGNOSIS — R13.14 DYSPHAGIA, PHARYNGOESOPHAGEAL PHASE: ICD-10-CM

## 2018-03-06 DIAGNOSIS — Z87.09 PERSONAL HISTORY OF OTHER DISEASES OF THE RESPIRATORY SYSTEM: ICD-10-CM

## 2018-03-06 PROCEDURE — 99214 OFFICE O/P EST MOD 30 MIN: CPT | Mod: 25

## 2018-03-06 PROCEDURE — 31575 DIAGNOSTIC LARYNGOSCOPY: CPT

## 2018-05-15 ENCOUNTER — APPOINTMENT (OUTPATIENT)
Dept: CT IMAGING | Facility: HOSPITAL | Age: 73
End: 2018-05-15

## 2018-05-16 ENCOUNTER — APPOINTMENT (OUTPATIENT)
Dept: OTOLARYNGOLOGY | Facility: CLINIC | Age: 73
End: 2018-05-16

## 2018-05-31 ENCOUNTER — APPOINTMENT (OUTPATIENT)
Dept: CT IMAGING | Facility: CLINIC | Age: 73
End: 2018-05-31
Payer: MEDICARE

## 2018-05-31 ENCOUNTER — OUTPATIENT (OUTPATIENT)
Dept: OUTPATIENT SERVICES | Facility: HOSPITAL | Age: 73
LOS: 1 days | End: 2018-05-31

## 2018-05-31 DIAGNOSIS — Z98.890 OTHER SPECIFIED POSTPROCEDURAL STATES: Chronic | ICD-10-CM

## 2018-05-31 DIAGNOSIS — Z95.0 PRESENCE OF CARDIAC PACEMAKER: Chronic | ICD-10-CM

## 2018-05-31 PROCEDURE — 70481 CT ORBIT/EAR/FOSSA W/DYE: CPT | Mod: 26

## 2018-06-04 ENCOUNTER — APPOINTMENT (OUTPATIENT)
Dept: RADIATION ONCOLOGY | Facility: CLINIC | Age: 73
End: 2018-06-04
Payer: MEDICARE

## 2018-06-04 VITALS
BODY MASS INDEX: 28.07 KG/M2 | OXYGEN SATURATION: 96 % | SYSTOLIC BLOOD PRESSURE: 158 MMHG | WEIGHT: 179.2 LBS | RESPIRATION RATE: 16 BRPM | DIASTOLIC BLOOD PRESSURE: 73 MMHG | HEART RATE: 62 BPM

## 2018-06-04 PROCEDURE — 99214 OFFICE O/P EST MOD 30 MIN: CPT

## 2018-06-04 RX ORDER — AMOXICILLIN AND CLAVULANATE POTASSIUM 875; 125 MG/1; MG/1
875-125 TABLET, COATED ORAL
Qty: 14 | Refills: 0 | Status: DISCONTINUED | COMMUNITY
Start: 2018-03-06 | End: 2018-06-04

## 2018-06-13 ENCOUNTER — APPOINTMENT (OUTPATIENT)
Dept: OTOLARYNGOLOGY | Facility: CLINIC | Age: 73
End: 2018-06-13
Payer: MEDICARE

## 2018-06-13 ENCOUNTER — OUTPATIENT (OUTPATIENT)
Dept: OUTPATIENT SERVICES | Facility: HOSPITAL | Age: 73
LOS: 1 days | End: 2018-06-13
Payer: MEDICARE

## 2018-06-13 VITALS
DIASTOLIC BLOOD PRESSURE: 73 MMHG | TEMPERATURE: 98.3 F | OXYGEN SATURATION: 97 % | HEART RATE: 70 BPM | SYSTOLIC BLOOD PRESSURE: 127 MMHG

## 2018-06-13 DIAGNOSIS — Z95.0 PRESENCE OF CARDIAC PACEMAKER: Chronic | ICD-10-CM

## 2018-06-13 DIAGNOSIS — Z98.890 OTHER SPECIFIED POSTPROCEDURAL STATES: Chronic | ICD-10-CM

## 2018-06-13 LAB
APTT BLD: 41.1 SEC — HIGH (ref 27.5–37.4)
BASOPHILS NFR BLD AUTO: 0.3 % — SIGNIFICANT CHANGE UP (ref 0–2)
EOSINOPHIL NFR BLD AUTO: 0.3 % — SIGNIFICANT CHANGE UP (ref 0–6)
HCT VFR BLD CALC: 43.8 % — SIGNIFICANT CHANGE UP (ref 34.5–45)
HGB BLD-MCNC: 14.8 G/DL — SIGNIFICANT CHANGE UP (ref 11.5–15.5)
INR BLD: 1.98 — HIGH (ref 0.88–1.16)
LYMPHOCYTES # BLD AUTO: 30.4 % — SIGNIFICANT CHANGE UP (ref 13–44)
MCHC RBC-ENTMCNC: 32.8 PG — SIGNIFICANT CHANGE UP (ref 27–34)
MCHC RBC-ENTMCNC: 33.8 G/DL — SIGNIFICANT CHANGE UP (ref 32–36)
MCV RBC AUTO: 97.1 FL — SIGNIFICANT CHANGE UP (ref 80–100)
MONOCYTES NFR BLD AUTO: 7 % — SIGNIFICANT CHANGE UP (ref 2–14)
NEUTROPHILS NFR BLD AUTO: 62 % — SIGNIFICANT CHANGE UP (ref 43–77)
PLATELET # BLD AUTO: 87 K/UL — LOW (ref 150–400)
PROTHROM AB SERPL-ACNC: 22.3 SEC — HIGH (ref 9.8–12.7)
RBC # BLD: 4.51 M/UL — SIGNIFICANT CHANGE UP (ref 3.8–5.2)
RBC # FLD: 14.2 % — SIGNIFICANT CHANGE UP (ref 10.3–16.9)
WBC # BLD: 6.2 K/UL — SIGNIFICANT CHANGE UP (ref 3.8–10.5)
WBC # FLD AUTO: 6.2 K/UL — SIGNIFICANT CHANGE UP (ref 3.8–10.5)

## 2018-06-13 PROCEDURE — 99205 OFFICE O/P NEW HI 60 MIN: CPT

## 2018-06-13 PROCEDURE — 99213 OFFICE O/P EST LOW 20 MIN: CPT

## 2018-06-14 DIAGNOSIS — D69.6 THROMBOCYTOPENIA, UNSPECIFIED: ICD-10-CM

## 2018-06-15 LAB
CONFIRM APTT STACLOT: NEGATIVE — SIGNIFICANT CHANGE UP
DRVVT SCREEN TO CONFIRM RATIO: SIGNIFICANT CHANGE UP
LA NT DPL PPP QL: 29.4 SEC — SIGNIFICANT CHANGE UP

## 2018-07-02 PROCEDURE — 85610 PROTHROMBIN TIME: CPT

## 2018-07-02 PROCEDURE — 36415 COLL VENOUS BLD VENIPUNCTURE: CPT

## 2018-07-02 PROCEDURE — 85598 HEXAGNAL PHOSPH PLTLT NEUTRL: CPT

## 2018-07-02 PROCEDURE — 85025 COMPLETE CBC W/AUTO DIFF WBC: CPT

## 2018-07-02 PROCEDURE — 85730 THROMBOPLASTIN TIME PARTIAL: CPT

## 2018-07-07 ENCOUNTER — OUTPATIENT (OUTPATIENT)
Dept: OUTPATIENT SERVICES | Facility: HOSPITAL | Age: 73
LOS: 1 days | End: 2018-07-07
Payer: MEDICARE

## 2018-07-07 ENCOUNTER — APPOINTMENT (OUTPATIENT)
Dept: ULTRASOUND IMAGING | Facility: CLINIC | Age: 73
End: 2018-07-07
Payer: MEDICARE

## 2018-07-07 DIAGNOSIS — Z95.0 PRESENCE OF CARDIAC PACEMAKER: Chronic | ICD-10-CM

## 2018-07-07 DIAGNOSIS — Z98.890 OTHER SPECIFIED POSTPROCEDURAL STATES: Chronic | ICD-10-CM

## 2018-07-07 PROCEDURE — 76856 US EXAM PELVIC COMPLETE: CPT | Mod: 26

## 2018-07-07 PROCEDURE — 76856 US EXAM PELVIC COMPLETE: CPT

## 2018-07-12 ENCOUNTER — RECORD ABSTRACTING (OUTPATIENT)
Age: 73
End: 2018-07-12

## 2018-07-12 DIAGNOSIS — K58.9 IRRITABLE BOWEL SYNDROME W/OUT DIARRHEA: ICD-10-CM

## 2018-07-12 DIAGNOSIS — I49.9 CARDIAC ARRHYTHMIA, UNSPECIFIED: ICD-10-CM

## 2018-07-12 DIAGNOSIS — Z83.6 FAMILY HISTORY OF OTHER DISEASES OF THE RESPIRATORY SYSTEM: ICD-10-CM

## 2018-07-12 DIAGNOSIS — K21.9 GASTRO-ESOPHAGEAL REFLUX DISEASE W/OUT ESOPHAGITIS: ICD-10-CM

## 2018-07-12 DIAGNOSIS — Z82.0 FAMILY HISTORY OF EPILEPSY AND OTHER DISEASES OF THE NERVOUS SYSTEM: ICD-10-CM

## 2018-07-17 ENCOUNTER — APPOINTMENT (OUTPATIENT)
Dept: OPHTHALMOLOGY | Facility: CLINIC | Age: 73
End: 2018-07-17
Payer: MEDICARE

## 2018-07-24 ENCOUNTER — APPOINTMENT (OUTPATIENT)
Dept: OPHTHALMOLOGY | Facility: CLINIC | Age: 73
End: 2018-07-24
Payer: MEDICARE

## 2018-07-24 DIAGNOSIS — H26.493 OTHER SECONDARY CATARACT, BILATERAL: ICD-10-CM

## 2018-07-24 PROBLEM — I10 ESSENTIAL (PRIMARY) HYPERTENSION: Chronic | Status: ACTIVE | Noted: 2017-06-17

## 2018-07-24 PROBLEM — N40.0 BENIGN PROSTATIC HYPERPLASIA WITHOUT LOWER URINARY TRACT SYMPTOMS: Chronic | Status: ACTIVE | Noted: 2017-06-17

## 2018-07-24 PROCEDURE — 92014 COMPRE OPH EXAM EST PT 1/>: CPT

## 2018-07-24 PROCEDURE — 92133 CPTRZD OPH DX IMG PST SGM ON: CPT

## 2018-07-31 LAB
LYMPHOCYTES # BLD AUTO: 30 % — SIGNIFICANT CHANGE UP (ref 13–44)
MANUAL DIF COMMENT BLD-IMP: SIGNIFICANT CHANGE UP
MANUAL SMEAR VERIFICATION: YES — SIGNIFICANT CHANGE UP
MONOCYTES NFR BLD AUTO: 7 % — SIGNIFICANT CHANGE UP (ref 2–14)
NEUTROPHILS NFR BLD AUTO: 62 % — SIGNIFICANT CHANGE UP (ref 43–77)
NEUTS BAND # BLD: 1 % — SIGNIFICANT CHANGE UP
PLAT MORPH BLD: ABNORMAL
RBC BLD AUTO: NORMAL — SIGNIFICANT CHANGE UP

## 2018-10-10 ENCOUNTER — APPOINTMENT (OUTPATIENT)
Dept: VASCULAR SURGERY | Facility: CLINIC | Age: 73
End: 2018-10-10
Payer: MEDICARE

## 2018-10-10 VITALS — OXYGEN SATURATION: 96 % | SYSTOLIC BLOOD PRESSURE: 122 MMHG | HEART RATE: 61 BPM | DIASTOLIC BLOOD PRESSURE: 65 MMHG

## 2018-10-10 VITALS — WEIGHT: 176 LBS | HEIGHT: 67 IN | BODY MASS INDEX: 27.62 KG/M2

## 2018-10-10 DIAGNOSIS — M79.606 PAIN IN LEG, UNSPECIFIED: ICD-10-CM

## 2018-10-10 PROCEDURE — 99204 OFFICE O/P NEW MOD 45 MIN: CPT

## 2018-10-10 PROCEDURE — 93970 EXTREMITY STUDY: CPT

## 2018-10-31 NOTE — REASON FOR VISIT
[Routine Follow-Up] : routine follow-up visit for [Brain Tumor] : brain tumor [Other: ___] : [unfilled] [Spouse] : spouse

## 2018-11-01 ENCOUNTER — APPOINTMENT (OUTPATIENT)
Dept: RADIATION ONCOLOGY | Facility: CLINIC | Age: 73
End: 2018-11-01
Payer: MEDICARE

## 2018-11-01 VITALS
OXYGEN SATURATION: 95 % | DIASTOLIC BLOOD PRESSURE: 71 MMHG | WEIGHT: 179 LBS | BODY MASS INDEX: 28.04 KG/M2 | RESPIRATION RATE: 16 BRPM | SYSTOLIC BLOOD PRESSURE: 161 MMHG | HEART RATE: 61 BPM

## 2018-11-01 PROCEDURE — 99214 OFFICE O/P EST MOD 30 MIN: CPT

## 2018-11-01 RX ORDER — MECLIZINE HYDROCHLORIDE 25 MG/1
25 TABLET ORAL 3 TIMES DAILY
Qty: 60 | Refills: 0 | Status: DISCONTINUED | COMMUNITY
Start: 2017-07-20 | End: 2018-11-01

## 2018-11-01 RX ORDER — PANTOPRAZOLE 40 MG/1
40 TABLET, DELAYED RELEASE ORAL
Qty: 90 | Refills: 0 | Status: DISCONTINUED | COMMUNITY
Start: 2016-12-07 | End: 2018-11-01

## 2018-11-02 NOTE — PHYSICAL EXAM
[Normal Oral Cavity] : oral cavity was normal [PERRL With Normal Accommodation] : pupils were equal in size, round, reactive to light [Extraocular Movements] : extraocular movements were intact [Skin Color & Pigmentation] : normal skin color and pigmentation [No Focal Deficits] : no focal deficits [Oriented To Time, Place, And Person] : oriented to person, place, and time [Normal] : no joint swelling, no clubbing or cyanosis of the fingernails and muscle strength and tone were normal [de-identified] : dry tongue. Right TM is unchanged from prior exam. Bilateral cerumen, Rt>lt

## 2018-11-02 NOTE — VITALS
[Maximal Pain Intensity: 0/10] : 0/10 [Least Pain Intensity: 0/10] : 0/10 [Pain Location: ___] : Pain Location: [unfilled] [80: Normal activity with effort; some signs or symptoms of disease.] : 80: Normal activity with effort; some signs or symptoms of disease.  [ECOG Performance Status: 1 - Restricted in physically strenuous activity but ambulatory and able to carry out work of a light or sedentary nature] : Performance Status: 1 - Restricted in physically strenuous activity but ambulatory and able to carry out work of a light or sedentary nature, e.g., light house work, office work

## 2018-11-02 NOTE — HISTORY OF PRESENT ILLNESS
[FreeTextEntry1] : Mr. Vick received definitive RT 5000cGy to right glomus jugulare tumor from 10/3/16 - 12/8/16 over 25 fractions. \par \par 11/1/18-Follow up\par At his last visit he complained of vertigo and requested to try meclizine after discussion with his cardiologist. He also had complaints of dry mouth and was using biotene. He was advised to follow up with Dr. Martin.\par \par Notably, he followed up with Derek Martin and Chanel. He consulted with Dr. Sy (vascular) for leg pain and does not require intervention at this time\par \par Today he states that he has felt "total exhaustion" and has had vertigo since August. He has been seen by hematologist Dr. Mohamud for cytopenia of some sort, he does not know what.  He  states that he tried meclizine for 3 days which did not helped. He notes dry mouth for which water helps and occasional headaches. He has followed up with his dentist and PMD. He states that he has an appt. to see Dr. Martin 12/13/2018. He has been working out the last 4 days and feels perkier since then.\par \par \par 6/4/18- Today Mr. Vick presents today for follow up. Since he saw us last in December 2017 he has seen Dr. Martin twice since he saw us- once for cerumen removal and once for concern about dysphagia. \par CT IAC done on 5/31/18 shows decrease in size of right glomus jugulare tumor, previously max dimension 2.7cm, now 2.5cm. \par \par Today he notes dryness of mouth, not using mouth rinses. Using prevident toothpaste but not doing head, mouth and neck exercises. Appetite good. Denies dysphagia. His main concerns are vertigo and dryness. He thinks he has used meclizine in the past but cannot recall.\par \par HISTORY: \par Mr. 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 He has had vertigo for about 9 weeks. He always feels off balance when standing or walking and often feels like the room is spinning. He reports this sensation is at times 10/10 in severity. Walking makes it worse.  However, he noticed after a brief course of steroids early in his diagnosis, a significant decrease in dizziness and improvement in his ability to walk.\par \par He was referred to Dr. Hastings (ENT) by his PCP eventually and imaging with a CT head on 10/6/16 identified a lesion of the right jugular foramen/ jugular bulb, thought to be consistent with a right-sided glomus tumor. There was an expansile permeative lytic lesion centered upon the right jugular foramen with extension medially into the cerebellopontine angle and slightly inferiorly into the upper neck soft tissues measuring approximately 2.8 x 2 x 2.7 cm.  There was partial erosion of the floor of the internal auditory canal and erosion of the carotid canal.  Additionally, there appeared to be erosion overlying the vestibule and posterior semicircular canal.\par \par Of note, in 2015 he had a CT head done at [facility] for headaches and on my review, it appears that the mass may have been present on the scan as well. \par \par PET/CT here at [facility] on 10/11/16 showed area of FDG uptake at right jugular bulb consistent with known tumor.   Also, hypodense lesion was noted in the right lobe of the liver without any significant FDG uptake.\par \par On 10/12/2016, he had audiology testing and was noted to have asymmetry in total discrimination with the right ear being worse.\par \par The patient denies vision changes, facial numbness or weakness, vision changes but does admit to around ~50% hearing loss on the right associate with his vertigo.  He admits to dysphagia with a foreign body sensation in his throat when swallowing liquids and solids. His vertigo prevents him from going about his usual activities. He is typically active and works out with a  twice/wk, but has slowed down since the symptoms began.   \par \par 6/27/17\par CT temporal bone w/ contrast on 3/15/17 showed overall stable glomus tumor, increased in size by 2mm. This was reviewed in tumor board and group consensus was 3 month follow up. CT 6/23/17 shows stable to slightly decreased size of the left jugular tumor. I reviewed his images in neuro-oncology tumor Board yesterday and the recommendation was for CT scan in 6 months.\par He is back to his normal activity. He recently had a battery change to his defibrillator. He has been following regularly with his cardiologist and other physicians. He is wondering if he can have meclizine for vertigo since his symptoms  continue to recur. He was recently in the emergency room for vertigo and his symptoms improved after receiving IV fluids.\par \par 12/4/17-Since his last visit he has continued to follow with Dr. Locke. He has seen Dr. 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 Reports episodic vertigo/dizziness for which he has been seeing Dr. Martin who started him on meclizine with good effect. \par CT brain 11/16/17 findings:   noted lesion measures approximately 2.2cm in craniocaudad dimension, decreased compared to temporal bone CT where it measured 2.6cm. No associated carotid stenosis or occlusion. Jugular vein patent. \par Mr. Vick feels generally well, c/o some fatigue, but is able to stay physically active and exercise throughout the week. Notes occasional vertigo and dizziness.  Appetite is normal, no sore throat or discomfort upon swallowing, but notes dry mouth still remains the same. \par

## 2018-11-02 NOTE — REVIEW OF SYSTEMS
[Dizziness] : dizziness [Anxiety] : anxiety [Negative] : Allergic/Immunologic [Visual Disturbances] : no visual disturbances [Fatigue] : fatigue [Loss of Hearing] : loss of hearing [Fatigue: Grade 2 - Fatigue not relieved by rest; limiting instrumental ADL] : Fatigue: Grade 2 - Fatigue not relieved by rest; limiting instrumental ADL [Blurred Vision: Grade 0] : Blurred Vision: Grade 0 [Xerostomia: Grade 1 - Symptomatic (e.g., dry or thick saliva) without significant dietary alteration; unstimulated saliva flow >0.2 ml/min] : Xerostomia: Grade 1 - Symptomatic (e.g., dry or thick saliva) without significant dietary alteration; unstimulated saliva flow >0.2 ml/min [Fever] : no fever [Chills] : no chills [Night Sweats] : no night sweats [Dysphagia] : no dysphagia [Odynophagia] : no odynophagia [Confused] : no confusion [Swollen Glands] : no swollen glands [FreeTextEntry2] : as noted in HPI [FreeTextEntry4] : dry mouth, deaf right ear [de-identified] : vertigo, impaired recall

## 2018-11-06 ENCOUNTER — APPOINTMENT (OUTPATIENT)
Dept: OTOLARYNGOLOGY | Facility: CLINIC | Age: 73
End: 2018-11-06

## 2018-11-09 ENCOUNTER — APPOINTMENT (OUTPATIENT)
Dept: OTOLARYNGOLOGY | Facility: CLINIC | Age: 73
End: 2018-11-09
Payer: MEDICARE

## 2018-11-09 VITALS
HEART RATE: 74 BPM | OXYGEN SATURATION: 95 % | SYSTOLIC BLOOD PRESSURE: 138 MMHG | DIASTOLIC BLOOD PRESSURE: 76 MMHG | TEMPERATURE: 98.1 F

## 2018-11-09 PROCEDURE — 99214 OFFICE O/P EST MOD 30 MIN: CPT

## 2018-12-07 PROBLEM — Z86.19 HISTORY OF INFECTIOUS MONONUCLEOSIS: Status: RESOLVED | Noted: 2018-07-12 | Resolved: 2018-12-07

## 2018-12-07 PROBLEM — Z86.39 HISTORY OF THYROID NODULE: Status: RESOLVED | Noted: 2018-07-12 | Resolved: 2018-12-07

## 2018-12-10 ENCOUNTER — APPOINTMENT (OUTPATIENT)
Dept: HEMATOLOGY ONCOLOGY | Facility: CLINIC | Age: 73
End: 2018-12-10
Payer: MEDICARE

## 2018-12-10 VITALS
SYSTOLIC BLOOD PRESSURE: 126 MMHG | BODY MASS INDEX: 28.77 KG/M2 | WEIGHT: 179 LBS | TEMPERATURE: 97.7 F | OXYGEN SATURATION: 96 % | DIASTOLIC BLOOD PRESSURE: 80 MMHG | HEART RATE: 65 BPM | HEIGHT: 66 IN

## 2018-12-10 DIAGNOSIS — Z86.39 PERSONAL HISTORY OF OTHER ENDOCRINE, NUTRITIONAL AND METABOLIC DISEASE: ICD-10-CM

## 2018-12-10 DIAGNOSIS — Z86.19 PERSONAL HISTORY OF OTHER INFECTIOUS AND PARASITIC DISEASES: ICD-10-CM

## 2018-12-10 PROCEDURE — 99214 OFFICE O/P EST MOD 30 MIN: CPT | Mod: 25

## 2018-12-10 PROCEDURE — 36415 COLL VENOUS BLD VENIPUNCTURE: CPT

## 2018-12-10 NOTE — ASSESSMENT
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 His recent blood work revealed a low platelet count, slightly elevated MCV, normal B12 and folate. Have ordered Anti-Nuclear Antibody, Complete Blood Count w DIFF, Ferritin, Iron Panel and Reticulocyte Count. Patient was advised to call office to discuss results and next appointment date.

## 2018-12-10 NOTE — PHYSICAL EXAM
[Normal] : affect appropriate [de-identified] : pacemaker left anterior chest wall, RRR. S1, S2, murmur

## 2018-12-10 NOTE — HISTORY OF PRESENT ILLNESS
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 Sidhu revealed a low platelet count of 75,000 and  an MCV of 105.6. He also had a normal folate and B12 that was at the high end of normal. Platelet antibodies and a positive NORMA have been documented at time of the initial consultation. He complains of fatigue, easy bruising (on warfarin), numbness in lower extremities,  arthritis in hands and feet for which he takes OTC analgesics. Denies unintentional weight loss, excessive bruising, fever, chills or sweats.

## 2018-12-11 ENCOUNTER — LABORATORY RESULT (OUTPATIENT)
Age: 73
End: 2018-12-11

## 2018-12-11 ENCOUNTER — APPOINTMENT (OUTPATIENT)
Dept: OPHTHALMOLOGY | Facility: CLINIC | Age: 73
End: 2018-12-11
Payer: MEDICARE

## 2018-12-11 LAB
BASOPHILS NFR BLD AUTO: 0.2 %
EOSINOPHIL NFR BLD AUTO: 0.3 %
FERRITIN SERPL-MCNC: 83 NG/ML
HCT VFR BLD CALC: 45.2 %
HGB BLD-MCNC: 14.7 G/DL
IRON SATN MFR SERPL: 35 %
IRON SERPL-MCNC: 112 UG/DL
LYMPHOCYTES NFR BLD AUTO: 28.3 %
MAN DIFF?: NO
MCHC RBC-ENTMCNC: 32.5 G/DL
MCHC RBC-ENTMCNC: 33 PG
MCV RBC AUTO: 101.3 FL
MONOCYTES NFR BLD AUTO: 7.9 %
NEUTROPHILS NFR BLD AUTO: 63.3 %
PLATELET # BLD AUTO: 82 K/UL
RBC # BLD: 4.46 M/UL
RBC # BLD: 4.46 M/UL
RBC # FLD: 14.6 %
RETICS # AUTO: 1 %
TIBC SERPL-MCNC: 318 UG/DL
UIBC SERPL-MCNC: 206 UG/DL
WBC # FLD AUTO: 5.7 K/UL

## 2018-12-11 PROCEDURE — 92083 EXTENDED VISUAL FIELD XM: CPT

## 2018-12-11 PROCEDURE — 92014 COMPRE OPH EXAM EST PT 1/>: CPT

## 2018-12-11 PROCEDURE — 92133 CPTRZD OPH DX IMG PST SGM ON: CPT

## 2018-12-12 ENCOUNTER — FORM ENCOUNTER (OUTPATIENT)
Age: 73
End: 2018-12-12

## 2018-12-12 LAB — ANA SER IF-ACNC: NEGATIVE

## 2018-12-13 ENCOUNTER — APPOINTMENT (OUTPATIENT)
Dept: CT IMAGING | Facility: HOSPITAL | Age: 73
End: 2018-12-13
Payer: MEDICARE

## 2018-12-13 ENCOUNTER — OUTPATIENT (OUTPATIENT)
Dept: OUTPATIENT SERVICES | Facility: HOSPITAL | Age: 73
LOS: 1 days | End: 2018-12-13
Payer: MEDICARE

## 2018-12-13 DIAGNOSIS — Z98.890 OTHER SPECIFIED POSTPROCEDURAL STATES: Chronic | ICD-10-CM

## 2018-12-13 DIAGNOSIS — Z95.0 PRESENCE OF CARDIAC PACEMAKER: Chronic | ICD-10-CM

## 2018-12-13 PROCEDURE — 70481 CT ORBIT/EAR/FOSSA W/DYE: CPT

## 2018-12-13 PROCEDURE — 70481 CT ORBIT/EAR/FOSSA W/DYE: CPT | Mod: 26

## 2018-12-18 ENCOUNTER — APPOINTMENT (OUTPATIENT)
Dept: RADIATION ONCOLOGY | Facility: CLINIC | Age: 73
End: 2018-12-18
Payer: MEDICARE

## 2018-12-18 VITALS
HEART RATE: 70 BPM | WEIGHT: 179.2 LBS | BODY MASS INDEX: 28.92 KG/M2 | RESPIRATION RATE: 16 BRPM | OXYGEN SATURATION: 96 %

## 2018-12-18 PROCEDURE — 99214 OFFICE O/P EST MOD 30 MIN: CPT

## 2018-12-18 NOTE — HISTORY OF PRESENT ILLNESS
[FreeTextEntry1] : Mr. 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 \par \par 12/18/18-Follow up\par At his last visit he was recovering well but continued to complain of fatigue and vertigo. He followed up with Dr. Martin on 11/9/18 for vertigo, and was advised to have vestibular therapy, which the patient declined.  He consulted with Dr. 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 He states that vertigo is better and notes that he declined vestibular therapy. Denies headaches or visual changes. Recently saw opthalmologist and was told eye exam normal. His wife continues to deal with complications of a stroke and now has Graves disease.\par \par \par 11/1/18-Follow up\par At his last visit he complained of vertigo and requested to try meclizine after discussion with his cardiologist. He also had complaints of dry mouth and was using biotene. He was advised to follow up with Dr. Martin.\par \par Notably, he followed up with Derek Martin and Chanel. He consulted with Dr. Sy (vascular) for leg pain and does not require intervention at this time\par \par Today he states that he has felt "total exhaustion" and has had vertigo since August. He has been seen by hematologist Dr. Mohamud for cytopenia of some sort, he does not know what.  He  states that he tried meclizine for 3 days which did not helped. He notes dry mouth for which water helps and occasional headaches. He has followed up with his dentist and PMD. He states that he has an appt. to see Dr. Martin 12/13/2018. He has been working out the last 4 days and feels perkier since then.\par \par \par 6/4/18- Today Mr. Vick presents today for follow up. Since he saw us last in December 2017 he has seen Dr. Martin twice since he saw us- once for cerumen removal and once for concern about dysphagia. \par CT IAC done on 5/31/18 shows decrease in size of right glomus jugulare tumor, previously max dimension 2.7cm, now 2.5cm. \par \par Today he notes dryness of mouth, not using mouth rinses. Using prevident toothpaste but not doing head, mouth and neck exercises. Appetite good. Denies dysphagia. His main concerns are vertigo and dryness. He thinks he has used meclizine in the past but cannot recall.\par \par HISTORY: \par Mr. 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 He has had vertigo for about 9 weeks. He always feels off balance when standing or walking and often feels like the room is spinning. He reports this sensation is at times 10/10 in severity. Walking makes it worse.  However, he noticed after a brief course of steroids early in his diagnosis, a significant decrease in dizziness and improvement in his ability to walk.\par \par He was referred to Dr. Hastings (ENT) by his PCP eventually and imaging with a CT head on 10/6/16 identified a lesion of the right jugular foramen/ jugular bulb, thought to be consistent with a right-sided glomus tumor. There was an expansile permeative lytic lesion centered upon the right jugular foramen with extension medially into the cerebellopontine angle and slightly inferiorly into the upper neck soft tissues measuring approximately 2.8 x 2 x 2.7 cm.  There was partial erosion of the floor of the internal auditory canal and erosion of the carotid canal.  Additionally, there appeared to be erosion overlying the vestibule and posterior semicircular canal.\par \par Of note, in 2015 he had a CT head done at [facility] for headaches and on my review, it appears that the mass may have been present on the scan as well. \par \par PET/CT here at [facility] on 10/11/16 showed area of FDG uptake at right jugular bulb consistent with known tumor.   Also, hypodense lesion was noted in the right lobe of the liver without any significant FDG uptake.\par \par On 10/12/2016, he had audiology testing and was noted to have asymmetry in total discrimination with the right ear being worse.\par \par The patient denies vision changes, facial numbness or weakness, vision changes but does admit to around ~50% hearing loss on the right associate with his vertigo.  He admits to dysphagia with a foreign body sensation in his throat when swallowing liquids and solids. His vertigo prevents him from going about his usual activities. He is typically active and works out with a  twice/wk, but has slowed down since the symptoms began.   \par \par 6/27/17\par CT temporal bone w/ contrast on 3/15/17 showed overall stable glomus tumor, increased in size by 2mm. This was reviewed in tumor board and group consensus was 3 month follow up. CT 6/23/17 shows stable to slightly decreased size of the left jugular tumor. I reviewed his images in neuro-oncology tumor Board yesterday and the recommendation was for CT scan in 6 months.\par He is back to his normal activity. He recently had a battery change to his defibrillator. He has been following regularly with his cardiologist and other physicians. He is wondering if he can have meclizine for vertigo since his symptoms  continue to recur. He was recently in the emergency room for vertigo and his symptoms improved after receiving IV fluids.\par \par 12/4/17-Since his last visit he has continued to follow with Dr. Locke. He has seen Dr. 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 Reports episodic vertigo/dizziness for which he has been seeing Dr. Martin who started him on meclizine with good effect. \par CT brain 11/16/17 findings:   noted lesion measures approximately 2.2cm in craniocaudad dimension, decreased compared to temporal bone CT where it measured 2.6cm. No associated carotid stenosis or occlusion. Jugular vein patent. \par Mr. Vick feels generally well, c/o some fatigue, but is able to stay physically active and exercise throughout the week. Notes occasional vertigo and dizziness.  Appetite is normal, no sore throat or discomfort upon swallowing, but notes dry mouth still remains the same. \par

## 2018-12-18 NOTE — REVIEW OF SYSTEMS
[Loss of Hearing] : loss of hearing [Blurred Vision: Grade 0] : Blurred Vision: Grade 0 [Dizziness] : dizziness [Negative] : Respiratory [Nausea: Grade 0] : Nausea: Grade 0 [Fatigue: Grade 0] : Fatigue: Grade 0 [Tinnitus - Grade 0] : Tinnitus - Grade 0 [Xerostomia: Grade 0] : Xerostomia: Grade 0 [Dysphagia] : no dysphagia [Confused] : no confusion [Swollen Glands] : no swollen glands [FreeTextEntry2] : as noted in HPI [FreeTextEntry4] :  deaf right ear [de-identified] :  vertigo

## 2018-12-18 NOTE — PHYSICAL EXAM
[PERRL With Normal Accommodation] : pupils were equal in size, round, reactive to light [Extraocular Movements] : extraocular movements were intact [Normal Oral Cavity] : oral cavity was normal [Skin Color & Pigmentation] : normal skin color and pigmentation [No Focal Deficits] : no focal deficits [Oriented To Time, Place, And Person] : oriented to person, place, and time [Both Tympanic Membranes Were Examined] : both tympanic membranes were normal [Normal] : normal skin color and pigmentation and no rash [de-identified] : Stable erythema seen behind right TM - glomus tumor [de-identified] : right hearing loss.

## 2019-01-22 ENCOUNTER — APPOINTMENT (OUTPATIENT)
Dept: HEMATOLOGY ONCOLOGY | Facility: CLINIC | Age: 74
End: 2019-01-22
Payer: MEDICARE

## 2019-01-22 VITALS
TEMPERATURE: 98.1 F | OXYGEN SATURATION: 97 % | SYSTOLIC BLOOD PRESSURE: 140 MMHG | HEART RATE: 67 BPM | HEIGHT: 66 IN | BODY MASS INDEX: 28.45 KG/M2 | DIASTOLIC BLOOD PRESSURE: 78 MMHG | WEIGHT: 177 LBS

## 2019-01-22 LAB
APTT BLD: 43.2 SEC
INR PPP: 2.45
PT BLD: 28.5 SEC

## 2019-01-22 PROCEDURE — 36415 COLL VENOUS BLD VENIPUNCTURE: CPT

## 2019-01-22 PROCEDURE — 99213 OFFICE O/P EST LOW 20 MIN: CPT | Mod: 25

## 2019-01-22 NOTE — REVIEW OF SYSTEMS
[Fatigue] : fatigue [Easy Bruising] : a tendency for easy bruising [Negative] : Allergic/Immunologic [FreeTextEntry2] : always tired, takes nap

## 2019-01-22 NOTE — ASSESSMENT
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 Recently was found to have a lower platelet count at 75,000..Reports some bruising.\par Coagulopathy - on warfarin\par Immune thrombocytopenia\par Viral suppression of platelets - Herpes Simplex\par Medications\par Macrocytosis - MCV normal on last CBC\par Will repeat the CBC, check  INR/PTT.. Advised patient that platelets might be lower during time of viral activation. Further recommendations pending results.

## 2019-01-22 NOTE — REASON FOR VISIT
[Follow-Up Visit] : a follow-up visit for [FreeTextEntry2] : Thrombocytopenia, macrocytosis, fatigue

## 2019-01-22 NOTE — HISTORY OF PRESENT ILLNESS
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 Sidhu again revealed a low platelet count of 75,000 and an MCV of 99.1 on 1/5/19 (was 101.3 in December). He also had a normal folate and B12 that was at the high end of normal. Platelet antibodies and a positive NORMA have been documented at time of the initial consultation. NORMA was negative at last visit. He complains of fatigue, easy bruising (on warfarin). He has had sporadic ecchymoses on trunk and extremities. He recently had an outbreak of an oral herpetic lesion. Denies unintentional weight loss, excessive bruising, blood in urine, blood in stool, epistaxis, gingival bleeding, fever, chills or sweats. \par \par

## 2019-01-22 NOTE — PHYSICAL EXAM
[Normal] : affect appropriate [de-identified] : healing herpetic lesion left lower lip [de-identified] : pacemaker left anterior chest wall, RRR. S1, S2, murmur [de-identified] : 6cm fading ecchymosis right thigh, two smaller ecchymoses left inguinal area/thigh

## 2019-01-23 ENCOUNTER — LABORATORY RESULT (OUTPATIENT)
Age: 74
End: 2019-01-23

## 2019-01-23 LAB
BASOPHILS NFR BLD AUTO: 0 %
EOSINOPHIL NFR BLD AUTO: 0.1 %
HCT VFR BLD CALC: 42.6 %
HGB BLD-MCNC: 13.9 G/DL
LYMPHOCYTES NFR BLD AUTO: 24.2 %
MAN DIFF?: NO
MCHC RBC-ENTMCNC: 32.4 PG
MCHC RBC-ENTMCNC: 32.6 G/DL
MCV RBC AUTO: 99.3 FL
MONOCYTES NFR BLD AUTO: 7.5 %
NEUTROPHILS NFR BLD AUTO: 68.2 %
PLATELET # BLD AUTO: 70 K/UL
RBC # BLD: 4.29 M/UL
RBC # FLD: 15 %
WBC # FLD AUTO: 6.7 K/UL

## 2019-04-02 ENCOUNTER — APPOINTMENT (OUTPATIENT)
Dept: OPHTHALMOLOGY | Facility: CLINIC | Age: 74
End: 2019-04-02
Payer: MEDICARE

## 2019-04-02 DIAGNOSIS — H40.1232 LOW-TENSION GLAUCOMA, BILATERAL, MODERATE STAGE: ICD-10-CM

## 2019-04-02 DIAGNOSIS — H35.373 PUCKERING OF MACULA, BILATERAL: ICD-10-CM

## 2019-04-02 DIAGNOSIS — H43.813 VITREOUS DEGENERATION, BILATERAL: ICD-10-CM

## 2019-04-02 DIAGNOSIS — Z96.1 PRESENCE OF INTRAOCULAR LENS: ICD-10-CM

## 2019-04-02 PROCEDURE — 92250 FUNDUS PHOTOGRAPHY W/I&R: CPT

## 2019-04-02 PROCEDURE — 92014 COMPRE OPH EXAM EST PT 1/>: CPT

## 2019-04-30 ENCOUNTER — APPOINTMENT (OUTPATIENT)
Dept: HEMATOLOGY ONCOLOGY | Facility: CLINIC | Age: 74
End: 2019-04-30
Payer: MEDICARE

## 2019-04-30 VITALS
SYSTOLIC BLOOD PRESSURE: 136 MMHG | TEMPERATURE: 98.3 F | HEART RATE: 72 BPM | BODY MASS INDEX: 27.97 KG/M2 | DIASTOLIC BLOOD PRESSURE: 80 MMHG | HEIGHT: 66 IN | OXYGEN SATURATION: 98 % | WEIGHT: 174 LBS

## 2019-04-30 PROCEDURE — 99213 OFFICE O/P EST LOW 20 MIN: CPT | Mod: 25

## 2019-04-30 PROCEDURE — 36415 COLL VENOUS BLD VENIPUNCTURE: CPT

## 2019-04-30 NOTE — END OF VISIT
[FreeTextEntry3] : All medical record entries made by the scribe were at my, Dr. Mohamud direction and personally dictated by me on Apr 30, 2019 . I have reviewed the chart and agree that the record accurately reflects my personal performance of the history, physical exam, assessment and plan. I have also personally directed, reviewed, and agreed with the chart.\par

## 2019-04-30 NOTE — HISTORY OF PRESENT ILLNESS
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 At the last visit, platelets were 70,000 and MCV was high normal. He remains on warfarin for his atrial fibrillation.The patient states that he saw Dr. Chowdary who recommended that he see a rheumatologist Dr. Eloise Mcdonald for his joint pain. He complains of sharp pain and occasional numbness in his fingers and toes. His ecchymosis on left leg has improved since last visit. Denies spontaneous bruising, excessive bleeding, fever, chills or sweats.

## 2019-04-30 NOTE — ADDENDUM
[FreeTextEntry1] : I, Darcie Alcala, acted solely as a scribe for Dr. Mohamud on Apr 30, 2019 .\par

## 2019-04-30 NOTE — REVIEW OF SYSTEMS
[Fatigue] : fatigue [Joint Pain] : joint pain [Joint Stiffness] : joint stiffness [Easy Bruising] : a tendency for easy bruising [Negative] : Allergic/Immunologic [FreeTextEntry9] : painful hands, numbness of toes [de-identified] : bruises easily with trauma. Is on warfarin

## 2019-04-30 NOTE — PHYSICAL EXAM
[Normal] : affect appropriate [de-identified] : pacemaker left anterior chest wall, RRR. S1, S2, murmur

## 2019-04-30 NOTE — ASSESSMENT
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 Have ordered CBC, NORMA, Rheumatoid factor and CMP. Patient was advised to call office to discuss results and next appointment date. Patient was provided with a copy of his blood results, as requested.

## 2019-05-01 LAB
ALBUMIN SERPL ELPH-MCNC: 5 G/DL
ALP BLD-CCNC: 91 U/L
ALT SERPL-CCNC: 30 U/L
ANA PAT FLD IF-IMP: ABNORMAL
ANA SER IF-ACNC: ABNORMAL
ANION GAP SERPL CALC-SCNC: 14 MMOL/L
AST SERPL-CCNC: 25 U/L
BASOPHILS # BLD AUTO: 0.02 K/UL
BASOPHILS NFR BLD AUTO: 0.4 %
BILIRUB SERPL-MCNC: 0.9 MG/DL
BUN SERPL-MCNC: 11 MG/DL
CALCIUM SERPL-MCNC: 9.8 MG/DL
CHLORIDE SERPL-SCNC: 104 MMOL/L
CO2 SERPL-SCNC: 24 MMOL/L
CREAT SERPL-MCNC: 0.74 MG/DL
EOSINOPHIL # BLD AUTO: 0.02 K/UL
EOSINOPHIL NFR BLD AUTO: 0.4 %
GLUCOSE SERPL-MCNC: 100 MG/DL
HCT VFR BLD CALC: 44.5 %
HGB BLD-MCNC: 14.5 G/DL
IMM GRANULOCYTES NFR BLD AUTO: 0.4 %
LYMPHOCYTES # BLD AUTO: 1.65 K/UL
LYMPHOCYTES NFR BLD AUTO: 30.5 %
MAN DIFF?: NORMAL
MCHC RBC-ENTMCNC: 32.6 GM/DL
MCHC RBC-ENTMCNC: 34 PG
MCV RBC AUTO: 104.2 FL
MONOCYTES # BLD AUTO: 0.35 K/UL
MONOCYTES NFR BLD AUTO: 6.5 %
NEUTROPHILS # BLD AUTO: 3.35 K/UL
NEUTROPHILS NFR BLD AUTO: 61.8 %
PLATELET # BLD AUTO: 75 K/UL
POTASSIUM SERPL-SCNC: 4.4 MMOL/L
PROT SERPL-MCNC: 7.4 G/DL
RBC # BLD: 4.27 M/UL
RBC # FLD: 14.2 %
RHEUMATOID FACT SER QL: <10 IU/ML
SODIUM SERPL-SCNC: 142 MMOL/L
WBC # FLD AUTO: 5.41 K/UL

## 2019-05-22 ENCOUNTER — FORM ENCOUNTER (OUTPATIENT)
Age: 74
End: 2019-05-22

## 2019-05-23 ENCOUNTER — APPOINTMENT (OUTPATIENT)
Dept: CT IMAGING | Facility: HOSPITAL | Age: 74
End: 2019-05-23
Payer: MEDICARE

## 2019-05-23 ENCOUNTER — OUTPATIENT (OUTPATIENT)
Dept: OUTPATIENT SERVICES | Facility: HOSPITAL | Age: 74
LOS: 1 days | End: 2019-05-23
Payer: MEDICARE

## 2019-05-23 DIAGNOSIS — Z95.0 PRESENCE OF CARDIAC PACEMAKER: Chronic | ICD-10-CM

## 2019-05-23 DIAGNOSIS — Z98.890 OTHER SPECIFIED POSTPROCEDURAL STATES: Chronic | ICD-10-CM

## 2019-05-23 PROCEDURE — 70481 CT ORBIT/EAR/FOSSA W/DYE: CPT

## 2019-05-23 PROCEDURE — 70481 CT ORBIT/EAR/FOSSA W/DYE: CPT | Mod: 26

## 2019-05-29 ENCOUNTER — APPOINTMENT (OUTPATIENT)
Dept: OTOLARYNGOLOGY | Facility: CLINIC | Age: 74
End: 2019-05-29

## 2019-05-29 ENCOUNTER — APPOINTMENT (OUTPATIENT)
Dept: RADIATION ONCOLOGY | Facility: CLINIC | Age: 74
End: 2019-05-29
Payer: MEDICARE

## 2019-05-29 VITALS
WEIGHT: 175.6 LBS | RESPIRATION RATE: 16 BRPM | SYSTOLIC BLOOD PRESSURE: 135 MMHG | OXYGEN SATURATION: 97 % | BODY MASS INDEX: 28.34 KG/M2 | DIASTOLIC BLOOD PRESSURE: 87 MMHG | HEART RATE: 57 BPM

## 2019-05-29 DIAGNOSIS — H05.89 OTHER DISORDERS OF ORBIT: ICD-10-CM

## 2019-05-29 PROCEDURE — 99214 OFFICE O/P EST MOD 30 MIN: CPT

## 2019-05-29 NOTE — PHYSICAL EXAM
[Both Tympanic Membranes Were Examined] : both tympanic membranes were normal [No Focal Deficits] : no focal deficits [Oriented To Time, Place, And Person] : oriented to person, place, and time [Normal] : no joint swelling, no clubbing or cyanosis of the fingernails and muscle strength and tone were normal [Extraocular Movements] : extraocular movements were intact [de-identified] : no abnormalities on external eye exam. [de-identified] : wax in right TM. [de-identified] : Right hearing loss, CN II-XII otherwise grossly intact; strength 5/5 in B/L upper and lower extremities

## 2019-05-29 NOTE — REVIEW OF SYSTEMS
[Loss of Hearing] : loss of hearing [Nausea: Grade 0] : Nausea: Grade 0 [Fatigue: Grade 0] : Fatigue: Grade 0 [Tinnitus - Grade 0] : Tinnitus - Grade 0 [Blurred Vision: Grade 0] : Blurred Vision: Grade 0 [Xerostomia: Grade 0] : Xerostomia: Grade 0 [Negative] : Neurological [Swollen Glands] : no swollen glands [de-identified] : vertigo - stable [FreeTextEntry4] :  deaf right ear

## 2019-05-29 NOTE — HISTORY OF PRESENT ILLNESS
[FreeTextEntry1] : Mr. 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 \par \par 5/29/19- FOLLOW UP\par Mr Vick returns today for routine follow up. She was last seen here on 12/18/18. The plan at that time was to return in 6 months for follow up and CT temporal bone w/ contrast, continue biotene, follow up with Cade Gregg, RUSTY and Elda. Encouraged to increase activity. \par \par CT temporal bones 5/23/19 - Continued stability in size and extent of right glomus jugulare tumor compared to May 2018.  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 These show indolent growth and behavior without invasion of the bony orbit and deviation, not invasion, of adjacent muscles. 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 Differential diagnosis may include primary orbital meningioma or possible inflammatory/granulomatous process although these are felt less likely and rarer possibilities. \par \par He last saw Dr. Mohamud for f/u of immune thrombocytopenia and macrocytosis on 4/30/19, platelet count was low but stable at 75,000, H/H and WBC were normal, .2, chemistries unremarkable, rheumatoid factor normal, NORMA +1 320 homogenous.  He is seeing Rheumatologist, Dr. Eloise Mcdonald, and states he was ruled out for rheumatoid arthritis.  He last saw Dr. Martin on 11/9/2018; he was recommended for vestibular rehab, which he has not done.  He is requesting a referral to a vestibular therapist near his home.  He last saw Dr. La (vascular) on 10/10/18.  He last saw PMD, Dr. Chowdary a few months ago.  He is planning to see a new electrophysiologist, Dr. Sosa, for pacemaker management.  Today, he reports feeling well.  He states that his vertigo symptoms are unchanged.  He denies fevers, chills, headaches, visual and hearing disturbances, unilateral weakness, dyspnea, chest pain, palpitations, nausea, vomiting, constipation and diarrhea.  \par \par 12/18/18-Follow up\par At his last visit he was recovering well but continued to complain of fatigue and vertigo. He followed up with Dr. Martin on 11/9/18 for vertigo, and was advised to have vestibular therapy, which the patient declined.  He consulted with Dr. 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 He states that vertigo is better and notes that he declined vestibular therapy. Denies headaches or visual changes. Recently saw opthalmologist and was told eye exam normal. His wife continues to deal with complications of a stroke and now has Graves disease.\par \par \par 11/1/18-Follow up\par At his last visit he complained of vertigo and requested to try meclizine after discussion with his cardiologist. He also had complaints of dry mouth and was using biotene. He was advised to follow up with Dr. Martin.\par \par Notably, he followed up with Derek Martin and Chanel. He consulted with Dr. Sy (vascular) for leg pain and does not require intervention at this time\par \par Today he states that he has felt "total exhaustion" and has had vertigo since August. He has been seen by hematologist Dr. Mohamud for cytopenia of some sort, he does not know what.  He  states that he tried meclizine for 3 days which did not helped. He notes dry mouth for which water helps and occasional headaches. He has followed up with his dentist and PMD. He states that he has an appt. to see Dr. Martin 12/13/2018. He has been working out the last 4 days and feels perkier since then.\par \par \par 6/4/18- Today Mr. Vick presents today for follow up. Since he saw us last in December 2017 he has seen Dr. Martin twice since he saw us- once for cerumen removal and once for concern about dysphagia. \par CT IAC done on 5/31/18 shows decrease in size of right glomus jugulare tumor, previously max dimension 2.7cm, now 2.5cm. \par \par Today he notes dryness of mouth, not using mouth rinses. Using prevident toothpaste but not doing head, mouth and neck exercises. Appetite good. Denies dysphagia. His main concerns are vertigo and dryness. He thinks he has used meclizine in the past but cannot recall.\par \par HISTORY: \par Mr. 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 He has had vertigo for about 9 weeks. He always feels off balance when standing or walking and often feels like the room is spinning. He reports this sensation is at times 10/10 in severity. Walking makes it worse.  However, he noticed after a brief course of steroids early in his diagnosis, a significant decrease in dizziness and improvement in his ability to walk.\par \par He was referred to Dr. Hastings (ENT) by his PCP eventually and imaging with a CT head on 10/6/16 identified a lesion of the right jugular foramen/ jugular bulb, thought to be consistent with a right-sided glomus tumor. There was an expansile permeative lytic lesion centered upon the right jugular foramen with extension medially into the cerebellopontine angle and slightly inferiorly into the upper neck soft tissues measuring approximately 2.8 x 2 x 2.7 cm.  There was partial erosion of the floor of the internal auditory canal and erosion of the carotid canal.  Additionally, there appeared to be erosion overlying the vestibule and posterior semicircular canal.\par \par Of note, in 2015 he had a CT head done at [facility] for headaches and on my review, it appears that the mass may have been present on the scan as well. \par \par PET/CT here at [facility] on 10/11/16 showed area of FDG uptake at right jugular bulb consistent with known tumor.   Also, hypodense lesion was noted in the right lobe of the liver without any significant FDG uptake.\par \par On 10/12/2016, he had audiology testing and was noted to have asymmetry in total discrimination with the right ear being worse.\par \par The patient denies vision changes, facial numbness or weakness, vision changes but does admit to around ~50% hearing loss on the right associate with his vertigo.  He admits to dysphagia with a foreign body sensation in his throat when swallowing liquids and solids. His vertigo prevents him from going about his usual activities. He is typically active and works out with a  twice/wk, but has slowed down since the symptoms began.   \par \par 6/27/17\par CT temporal bone w/ contrast on 3/15/17 showed overall stable glomus tumor, increased in size by 2mm. This was reviewed in tumor board and group consensus was 3 month follow up. CT 6/23/17 shows stable to slightly decreased size of the left jugular tumor. I reviewed his images in neuro-oncology tumor Board yesterday and the recommendation was for CT scan in 6 months.\par He is back to his normal activity. He recently had a battery change to his defibrillator. He has been following regularly with his cardiologist and other physicians. He is wondering if he can have meclizine for vertigo since his symptoms  continue to recur. He was recently in the emergency room for vertigo and his symptoms improved after receiving IV fluids.\par \par 12/4/17-Since his last visit he has continued to follow with Dr. Locke. He has seen Dr. 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 Reports episodic vertigo/dizziness for which he has been seeing Dr. Martin who started him on meclizine with good effect. \par CT brain 11/16/17 findings:   noted lesion measures approximately 2.2cm in craniocaudad dimension, decreased compared to temporal bone CT where it measured 2.6cm. No associated carotid stenosis or occlusion. Jugular vein patent. \par Mr. Vick feels generally well, c/o some fatigue, but is able to stay physically active and exercise throughout the week. Notes occasional vertigo and dizziness.  Appetite is normal, no sore throat or discomfort upon swallowing, but notes dry mouth still remains the same. \par

## 2019-06-05 ENCOUNTER — APPOINTMENT (OUTPATIENT)
Dept: HEART AND VASCULAR | Facility: CLINIC | Age: 74
End: 2019-06-05
Payer: MEDICARE

## 2019-06-05 ENCOUNTER — FORM ENCOUNTER (OUTPATIENT)
Age: 74
End: 2019-06-05

## 2019-06-05 VITALS
HEIGHT: 66 IN | DIASTOLIC BLOOD PRESSURE: 62 MMHG | WEIGHT: 174 LBS | HEART RATE: 60 BPM | SYSTOLIC BLOOD PRESSURE: 130 MMHG | BODY MASS INDEX: 27.97 KG/M2

## 2019-06-05 PROCEDURE — 93279 PRGRMG DEV EVAL PM/LDLS PM: CPT

## 2019-06-05 PROCEDURE — 99203 OFFICE O/P NEW LOW 30 MIN: CPT | Mod: 25

## 2019-06-06 ENCOUNTER — OUTPATIENT (OUTPATIENT)
Dept: OUTPATIENT SERVICES | Facility: HOSPITAL | Age: 74
LOS: 1 days | End: 2019-06-06
Payer: MEDICARE

## 2019-06-06 DIAGNOSIS — Z98.890 OTHER SPECIFIED POSTPROCEDURAL STATES: Chronic | ICD-10-CM

## 2019-06-06 DIAGNOSIS — Z95.0 PRESENCE OF CARDIAC PACEMAKER: Chronic | ICD-10-CM

## 2019-06-06 LAB — GLUCOSE BLDC GLUCOMTR-MCNC: 99 MG/DL — SIGNIFICANT CHANGE UP (ref 70–99)

## 2019-06-06 PROCEDURE — A9587: CPT

## 2019-06-06 PROCEDURE — 78815 PET IMAGE W/CT SKULL-THIGH: CPT | Mod: 26

## 2019-06-06 PROCEDURE — 82962 GLUCOSE BLOOD TEST: CPT

## 2019-06-06 PROCEDURE — 78815 PET IMAGE W/CT SKULL-THIGH: CPT

## 2019-06-09 ENCOUNTER — FORM ENCOUNTER (OUTPATIENT)
Age: 74
End: 2019-06-09

## 2019-06-10 ENCOUNTER — APPOINTMENT (OUTPATIENT)
Dept: CT IMAGING | Facility: CLINIC | Age: 74
End: 2019-06-10
Payer: MEDICARE

## 2019-06-10 ENCOUNTER — OUTPATIENT (OUTPATIENT)
Dept: OUTPATIENT SERVICES | Facility: HOSPITAL | Age: 74
LOS: 1 days | End: 2019-06-10

## 2019-06-10 ENCOUNTER — RESULT REVIEW (OUTPATIENT)
Age: 74
End: 2019-06-10

## 2019-06-10 DIAGNOSIS — Z95.0 PRESENCE OF CARDIAC PACEMAKER: Chronic | ICD-10-CM

## 2019-06-10 DIAGNOSIS — Z98.890 OTHER SPECIFIED POSTPROCEDURAL STATES: Chronic | ICD-10-CM

## 2019-06-10 PROCEDURE — 70486 CT MAXILLOFACIAL W/O DYE: CPT | Mod: 26

## 2019-06-11 ENCOUNTER — APPOINTMENT (OUTPATIENT)
Dept: OTOLARYNGOLOGY | Facility: CLINIC | Age: 74
End: 2019-06-11
Payer: MEDICARE

## 2019-06-11 VITALS
DIASTOLIC BLOOD PRESSURE: 72 MMHG | HEART RATE: 68 BPM | SYSTOLIC BLOOD PRESSURE: 150 MMHG | OXYGEN SATURATION: 95 % | TEMPERATURE: 98.9 F

## 2019-06-11 PROCEDURE — 69210 REMOVE IMPACTED EAR WAX UNI: CPT

## 2019-06-11 PROCEDURE — 99214 OFFICE O/P EST MOD 30 MIN: CPT | Mod: 25

## 2019-06-11 NOTE — DATA REVIEWED
[de-identified] : ct reviewed images with pt glomus stable, eye lesions c/w vascular Dr Fletcher's report reviewed with pt and S.O who accompanied him.

## 2019-06-11 NOTE — REASON FOR VISIT
[Subsequent Evaluation] : a subsequent evaluation for [FreeTextEntry2] : glomus tumor, dizziness, eye lesions

## 2019-06-11 NOTE — HISTORY OF PRESENT ILLNESS
[de-identified] : followup 72 yo man with r glomus tumor and psc erosion with intermittent dizziness - he still gets it a few x/week but has benefitted from vestibular therapy. H wishes to get it renewed at a new location nearer his home. 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 He received a voice mail message from Dr Rodriguez about this yesterday which he played for me in the office. He feels the dizziness is moderate when it occurs.

## 2019-06-11 NOTE — PROCEDURE
[Cerumen Impaction] : Cerumen Impaction [Same] : same as the Pre Op Dx. [] : Removal of Cerumen [FreeTextEntry6] : r copious cerumen removed atraumatically with hook

## 2019-06-17 ENCOUNTER — APPOINTMENT (OUTPATIENT)
Dept: OTOLARYNGOLOGY | Facility: CLINIC | Age: 74
End: 2019-06-17

## 2019-07-10 ENCOUNTER — APPOINTMENT (OUTPATIENT)
Dept: HEMATOLOGY ONCOLOGY | Facility: CLINIC | Age: 74
End: 2019-07-10
Payer: MEDICARE

## 2019-07-10 VITALS
WEIGHT: 175 LBS | OXYGEN SATURATION: 97 % | HEART RATE: 66 BPM | HEIGHT: 66 IN | TEMPERATURE: 98.6 F | SYSTOLIC BLOOD PRESSURE: 136 MMHG | DIASTOLIC BLOOD PRESSURE: 80 MMHG | BODY MASS INDEX: 28.12 KG/M2

## 2019-07-10 LAB
APTT BLD: 49.3 SEC
BASOPHILS # BLD AUTO: 0.02 K/UL
BASOPHILS NFR BLD AUTO: 0.3 %
EOSINOPHIL # BLD AUTO: 0.02 K/UL
EOSINOPHIL NFR BLD AUTO: 0.3 %
HCT VFR BLD CALC: 44.8 %
HGB BLD-MCNC: 14.5 G/DL
IMM GRANULOCYTES NFR BLD AUTO: 0.5 %
INR PPP: 2.83
LYMPHOCYTES # BLD AUTO: 1.82 K/UL
LYMPHOCYTES NFR BLD AUTO: 29.2 %
MAN DIFF?: NORMAL
MCHC RBC-ENTMCNC: 32.4 GM/DL
MCHC RBC-ENTMCNC: 33.9 PG
MCV RBC AUTO: 104.7 FL
MONOCYTES # BLD AUTO: 0.47 K/UL
MONOCYTES NFR BLD AUTO: 7.5 %
NEUTROPHILS # BLD AUTO: 3.87 K/UL
NEUTROPHILS NFR BLD AUTO: 62.2 %
PLATELET # BLD AUTO: 76 K/UL
PT BLD: 33 SEC
RBC # BLD: 4.28 M/UL
RBC # FLD: 14.5 %
WBC # FLD AUTO: 6.23 K/UL

## 2019-07-10 PROCEDURE — 99214 OFFICE O/P EST MOD 30 MIN: CPT | Mod: 25

## 2019-07-10 PROCEDURE — 36415 COLL VENOUS BLD VENIPUNCTURE: CPT

## 2019-07-10 NOTE — ASSESSMENT
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 Have ordered CBC and CMP and patient requests that his INR be rechecked.. Patient was advised to call office to discuss results and next appointment date.

## 2019-07-10 NOTE — PHYSICAL EXAM
[Normal] : affect appropriate [de-identified] : pacemaker left anterior chest wall, RRR. S1, S2, murmur [de-identified] : fading ecchymoses left lower extremity

## 2019-07-10 NOTE — REVIEW OF SYSTEMS
[Easy Bruising] : a tendency for easy bruising [Negative] : Allergic/Immunologic [de-identified] : headaches

## 2019-07-10 NOTE — HISTORY OF PRESENT ILLNESS
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 Blood results from his last visit in April revealed that the platelets were low but stable at 75,000 and MCV was 104.2. The hemoglobin, hematocrit, and white count were all normal. The chemistries were unremarkable, rheumatoid factor was normal, and NORMA was +1 320 homogeneous. He remains on Warfarin for his atrial fibrillation. In June, the patient had increased bruising and swelling and pain in the vein of his left leg. He was seen at the emergency room at [facility] where a Doppler of the left lower extremity did not reveal a DVT. The INR was 2.2, PTT 35, and the platelet count was 69,000 at the time. He complains of headaches on the left side of his head for which he will see his otolaryngologist. Denies excessive bleeding, hematuria, hematochezia, epistaxis fever, chills or sweats.

## 2019-07-10 NOTE — END OF VISIT
[FreeTextEntry3] : All medical record entries made by the Scribe were at my, Dr. Debby Mohamud, direction and personally dictated by me on 07/10/2019. I have reviewed the chart and agree that the record accurately reflects my personal performance of the history, physical exam, assessment and plan. I have also personally directed, reviewed, and agreed with the chart.

## 2019-07-11 ENCOUNTER — LABORATORY RESULT (OUTPATIENT)
Age: 74
End: 2019-07-11

## 2019-07-11 LAB
ALBUMIN SERPL ELPH-MCNC: 4.8 G/DL
ALP BLD-CCNC: 115 U/L
ALT SERPL-CCNC: 35 U/L
ANION GAP SERPL CALC-SCNC: 13 MMOL/L
AST SERPL-CCNC: 24 U/L
BILIRUB SERPL-MCNC: 0.7 MG/DL
BUN SERPL-MCNC: 11 MG/DL
CALCIUM SERPL-MCNC: 9.8 MG/DL
CHLORIDE SERPL-SCNC: 103 MMOL/L
CO2 SERPL-SCNC: 26 MMOL/L
CREAT SERPL-MCNC: 0.76 MG/DL
GLUCOSE SERPL-MCNC: 106 MG/DL
POTASSIUM SERPL-SCNC: 4.4 MMOL/L
PROT SERPL-MCNC: 7.3 G/DL
SODIUM SERPL-SCNC: 142 MMOL/L

## 2019-07-15 ENCOUNTER — APPOINTMENT (OUTPATIENT)
Dept: OTOLARYNGOLOGY | Facility: CLINIC | Age: 74
End: 2019-07-15
Payer: MEDICARE

## 2019-07-15 VITALS
SYSTOLIC BLOOD PRESSURE: 156 MMHG | BODY MASS INDEX: 28.12 KG/M2 | HEART RATE: 67 BPM | OXYGEN SATURATION: 98 % | DIASTOLIC BLOOD PRESSURE: 83 MMHG | HEIGHT: 66 IN | WEIGHT: 175 LBS

## 2019-07-15 PROCEDURE — 99212 OFFICE O/P EST SF 10 MIN: CPT

## 2019-07-15 NOTE — HISTORY OF PRESENT ILLNESS
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 Here today to review most recent CT scan results. \par \par Pt states symptoms have been stable to improve in regards to hearing loss from R ear, no longer losing wt. now stable wt, and improved fatigue and dyspnea. Denies any dysphagia, denies any HA. However pt reports that he is still having difficulty with vertigo. Pt has been to ER twice since his XRT treatment for severe vertigo to the point where he was unable to walk/eat/open his eyes. Last episode was about 2 months ago. He was evaluated by Dr. Don for hearing loss but has not seen anyone for tx of his vertigo.

## 2019-07-15 NOTE — PHYSICAL EXAM
[de-identified] : Glomus jugulare tumor visualized behind R TM [Normal] : orientation to person, place, and time: normal

## 2019-07-15 NOTE — DATA REVIEWED
[de-identified] : CT scan from 6/23/17- stable to slight decrease in size of R glomus jugulare tumor compared to Jan and March 2017 exams.. Approx unchanged tumoral extension and dehiscence of the floor of the IAC and tumor extension into the hypotympanum at the level of round window. R IJ patent below the mass.

## 2019-07-15 NOTE — ASSESSMENT
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 Glomus jugulare: \par - no evident increase in size\par -c/w follow up with dr roque and christin \par \par 2. Vertigo: \par - c/w follow up with dr roque and opthalmologist \par \par rtc prn \par \par

## 2019-08-01 ENCOUNTER — APPOINTMENT (OUTPATIENT)
Dept: OPHTHALMOLOGY | Facility: CLINIC | Age: 74
End: 2019-08-01
Payer: MEDICARE

## 2019-08-01 ENCOUNTER — NON-APPOINTMENT (OUTPATIENT)
Age: 74
End: 2019-08-01

## 2019-08-01 PROCEDURE — 92083 EXTENDED VISUAL FIELD XM: CPT

## 2019-08-01 PROCEDURE — 92014 COMPRE OPH EXAM EST PT 1/>: CPT

## 2019-08-01 PROCEDURE — 92133 CPTRZD OPH DX IMG PST SGM ON: CPT

## 2019-08-21 ENCOUNTER — APPOINTMENT (OUTPATIENT)
Dept: VASCULAR SURGERY | Facility: CLINIC | Age: 74
End: 2019-08-21
Payer: MEDICARE

## 2019-08-21 DIAGNOSIS — Z87.898 PERSONAL HISTORY OF OTHER SPECIFIED CONDITIONS: ICD-10-CM

## 2019-08-21 PROCEDURE — 99214 OFFICE O/P EST MOD 30 MIN: CPT

## 2019-08-25 NOTE — DISCUSSION/SUMMARY
[FreeTextEntry1] : Mr. Vick is a 73 year-olde male with chronic atrial fibrillation s/p SJM single chamber pacemaker in the setting of complete heart block.  His device is functioning properly with a 99% ventricular pacing.  He will need a TTE next visit to evaluate for pacing induced cardiomyopathy.  With regards to his AF, he remains on Coumadin for stroke prophylaxis.  In addition, he is on digoxin and metoprolol.  However, given his CHB,  I am unsure as to the need of digoxin and I may discontinue it in the future.  He will follow-up in 3-4 months for routine follow-up.

## 2019-08-25 NOTE — HISTORY OF PRESENT ILLNESS
[de-identified] : Mr. Vick is a 73 year-old male with ADRIEL, HTN, BPH, ITP, macrocytosis, and chronic atrial fibrillation who underwent single chamber pacemaker implantation ([employer]) in 2007 for high grade AV block.  He underwent generator replacement in 2017 and presents today to establish care.  He reports feeling well and denies chest pain, discomfort over the device site, palpitations, SOB, MARTIN, LE edema, orthopnea, PND, and syncope.  He reports a stable exercise tolerance and compliance with his medication regimen.

## 2019-08-25 NOTE — PROCEDURE
[Pacemaker] : pacemaker [VVIR] : VVIR [Lead Imp:  ___ohms] : lead impedance was [unfilled] ohms [Sensing Amplitude ___mv] : sensing amplitude was [unfilled] mv [___V @] : [unfilled] V [___ ms] : [unfilled] ms [de-identified] : [employer] [de-identified] : 60 [de-identified] : 8.5 years [de-identified] :  99%

## 2019-08-27 NOTE — PHYSICAL EXAM
[Respiratory Effort] : normal respiratory effort [2+] : left 2+ [No Rash or Lesion] : No rash or lesion [Alert] : alert [Oriented to Place] : oriented to place [Oriented to Person] : oriented to person [Oriented to Time] : oriented to time [Calm] : calm [JVD] : no jugular venous distention  [Ankle Swelling (On Exam)] : not present [Varicose Veins Of Lower Extremities] : not present [] : not present [de-identified] : Well appearing, NAD [de-identified] : NCAT [FreeTextEntry1] : No Leg edema. Excellent pulses bilaterally. [de-identified] : FROM

## 2019-08-27 NOTE — CONSULT LETTER
[Dear  ___] : Dear  [unfilled], [FreeTextEntry2] : Melchor Chowdary MD\par [address] [place] [address], [address]\[place] [address]\par \par Silvano Sidhu MD\[facility] [address] [place] [address], [facility] [address]\[place] [address]  [FreeTextEntry1] : I saw Mr. Mariam Vick in my office today for evaluation. He has history of ablation of both greater saphenous veins at another facility which was confirmed on duplex completed during his last visit. He has evidence of deep venous reflux but no intervention is indicated. He explains his legs still feel somewhat tired and with occasional pain. Denies any leg swelling and has been compliant with compression stockings. Additionally, about 5 months ago he explains he experienced spasms that lasted for approximately 4 hours accompanied by numbness on his feet.\par \par On exam, he has palpable pulses without varicose veins or leg edema. \par \par Mr. Vick has venous insufficiency and leg edema is improved with the use of compression stockings.  He had multiple vein procedures in the past.  I told him that stocking compliance is important as is daily walking.  In addition, when possible, he needs to elevate the legs above his chest.  He may follow-up here as needed. \par \par My complete EMR office note is below for your records. \par  [FreeTextEntry3] : Sincerely, \par \par Markel La M.D. \par , Surgical Services [facility]\par , Department of Surgery [facility]\par Professor of Surgery, [employer] at [facility]

## 2019-08-27 NOTE — ASSESSMENT
[Foot care/Footwear] : foot care/footwear [FreeTextEntry1] : 72 y/o M with bilaterally closed GSV. On exam, patient has excellent pulses bilaterally and no edema or varicose veins. From a vascular perspective, there are no concerns. No intervention required at this time. He is advised to stay active with daily walking as well as to monitor his feet for any wounds. He is advised to continue to manage swelling with compression stockings and keeping his legs elevated as much as possible. Patient will follow-up as needed.

## 2019-08-27 NOTE — HISTORY OF PRESENT ILLNESS
[FreeTextEntry1] : 74 y/o M with PPM (on Warfarin) here for follow up evaluation of his legs. Patient was last seen here in October 2018 and mentioned a previous procedure involving closure of his GSV bilaterally. He continues to endorse pain with walking but denies any leg swelling and continues to wear compression stockings. No changes since last visit. He adds that he does have focal areas of pain occasionally throughout his bilateral legs. Additionally, about 5 months ago he experienced spasms that lasted for 4 hours as well as numbness to his foot.\par \par \par

## 2019-08-27 NOTE — END OF VISIT
[FreeTextEntry3] : All medical record entries made by the Scribe were at my, Dr. La's direction and personally dictated by me on 08/21/2019 I have reviewed the chart and agree that the record accurately reflects my personal performance of the history, physical exam, assessment and plan. I have also personally directed, reviewed, and agreed with the chart.\par

## 2019-09-23 ENCOUNTER — APPOINTMENT (OUTPATIENT)
Dept: HEART AND VASCULAR | Facility: CLINIC | Age: 74
End: 2019-09-23

## 2019-11-18 ENCOUNTER — LABORATORY RESULT (OUTPATIENT)
Age: 74
End: 2019-11-18

## 2019-11-18 ENCOUNTER — APPOINTMENT (OUTPATIENT)
Dept: HEMATOLOGY ONCOLOGY | Facility: CLINIC | Age: 74
End: 2019-11-18
Payer: MEDICARE

## 2019-11-18 VITALS
BODY MASS INDEX: 28.93 KG/M2 | OXYGEN SATURATION: 97 % | DIASTOLIC BLOOD PRESSURE: 80 MMHG | WEIGHT: 180 LBS | HEIGHT: 66 IN | SYSTOLIC BLOOD PRESSURE: 140 MMHG | TEMPERATURE: 98 F | HEART RATE: 60 BPM

## 2019-11-18 PROCEDURE — 99213 OFFICE O/P EST LOW 20 MIN: CPT

## 2019-11-18 NOTE — ASSESSMENT
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 Perhaps platelet count transiently  decreased after influenza vaccine to account for the recent bruising  The patient is having an INR ordered by his cardiologist, Dr. Silvano Sidhu; have ordered CMP and CBC. Patient was advised to call office to discuss results and next appointment date. Will send a copy of the blood results to Dr. Sidhu.

## 2019-11-18 NOTE — REASON FOR VISIT
[Follow-Up Visit] : a follow-up visit for [FreeTextEntry2] : immune thrombocytopenia and macrocytosis.

## 2019-11-18 NOTE — PHYSICAL EXAM
[Normal] : affect appropriate [de-identified] : pacemaker left anterior chest wall, RRR. S1, S2, murmur [de-identified] : fading ecchymoses left upper extremity, right anterior chest wall, lower back near gluteal fold

## 2019-11-18 NOTE — REVIEW OF SYSTEMS
[Dysuria] : dysuria [Easy Bruising] : a tendency for easy bruising [Negative] : Allergic/Immunologic [FreeTextEntry8] : recent urine culture negative

## 2019-11-18 NOTE — HISTORY OF PRESENT ILLNESS
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 Patient's previous blood results from his last visit in July revealed that the platelets were decreased but stable at 76,000 and the MCV was 104.7. The hemoglobin, hematocrit, and white blood cell counts were all normal. The chemistries were unremarkable. The APTT was elevated at 49.3, and the PT was 33.0 seconds with an INR of 2.83. Patient remains on Warfarin for his atrial fibrillation. Patient complains of dysuria, for which he was seen at Urgent Care, and had a negative urine culture. He also complains of easy bruising, more frequent of late. Denies excessive bleeding, hematuria, hematochezia, epistaxis fever, chills, or sweats. Of note, patient received a high dose trivalent influenza vaccination approximately one month ago.

## 2019-11-19 LAB
ALBUMIN SERPL ELPH-MCNC: 4.9 G/DL
ALP BLD-CCNC: 88 U/L
ALT SERPL-CCNC: 32 U/L
ANION GAP SERPL CALC-SCNC: 12 MMOL/L
AST SERPL-CCNC: 24 U/L
BASOPHILS # BLD AUTO: 0.03 K/UL
BASOPHILS NFR BLD AUTO: 0.4 %
BILIRUB SERPL-MCNC: 0.9 MG/DL
BUN SERPL-MCNC: 13 MG/DL
CALCIUM SERPL-MCNC: 10 MG/DL
CHLORIDE SERPL-SCNC: 101 MMOL/L
CO2 SERPL-SCNC: 27 MMOL/L
CREAT SERPL-MCNC: 0.79 MG/DL
EOSINOPHIL # BLD AUTO: 0.02 K/UL
EOSINOPHIL NFR BLD AUTO: 0.3 %
GLUCOSE SERPL-MCNC: 97 MG/DL
HCT VFR BLD CALC: 45.1 %
HGB BLD-MCNC: 14.3 G/DL
IMM GRANULOCYTES NFR BLD AUTO: 0.3 %
LYMPHOCYTES # BLD AUTO: 1.74 K/UL
LYMPHOCYTES NFR BLD AUTO: 24.4 %
MAN DIFF?: NORMAL
MCHC RBC-ENTMCNC: 31.7 GM/DL
MCHC RBC-ENTMCNC: 33.5 PG
MCV RBC AUTO: 105.6 FL
MONOCYTES # BLD AUTO: 0.49 K/UL
MONOCYTES NFR BLD AUTO: 6.9 %
NEUTROPHILS # BLD AUTO: 4.84 K/UL
NEUTROPHILS NFR BLD AUTO: 67.7 %
PLATELET # BLD AUTO: 73 K/UL
POTASSIUM SERPL-SCNC: 4.1 MMOL/L
PROT SERPL-MCNC: 7.5 G/DL
RBC # BLD: 4.27 M/UL
RBC # FLD: 14.9 %
SODIUM SERPL-SCNC: 140 MMOL/L
WBC # FLD AUTO: 7.14 K/UL

## 2019-12-02 ENCOUNTER — APPOINTMENT (OUTPATIENT)
Dept: HEART AND VASCULAR | Facility: CLINIC | Age: 74
End: 2019-12-02
Payer: MEDICARE

## 2019-12-02 VITALS
WEIGHT: 174 LBS | BODY MASS INDEX: 27.97 KG/M2 | SYSTOLIC BLOOD PRESSURE: 140 MMHG | HEIGHT: 66 IN | DIASTOLIC BLOOD PRESSURE: 63 MMHG | HEART RATE: 63 BPM

## 2019-12-02 PROCEDURE — 99213 OFFICE O/P EST LOW 20 MIN: CPT | Mod: 25

## 2019-12-02 PROCEDURE — 93279 PRGRMG DEV EVAL PM/LDLS PM: CPT

## 2019-12-02 RX ORDER — METOPROLOL SUCCINATE 100 MG/1
100 TABLET, EXTENDED RELEASE ORAL
Qty: 60 | Refills: 5 | Status: ACTIVE | COMMUNITY

## 2019-12-02 NOTE — PHYSICAL EXAM
[Normal Appearance] : normal appearance [General Appearance - Well Developed] : well developed [Well Groomed] : well groomed [General Appearance - Well Nourished] : well nourished [General Appearance - In No Acute Distress] : no acute distress [No Deformities] : no deformities [Heart Rate And Rhythm] : heart rate and rhythm were normal [Heart Sounds] : normal S1 and S2 [Murmurs] : no murmurs present [Respiration, Rhythm And Depth] : normal respiratory rhythm and effort [Exaggerated Use Of Accessory Muscles For Inspiration] : no accessory muscle use [Auscultation Breath Sounds / Voice Sounds] : lungs were clear to auscultation bilaterally [Clean] : clean [Dry] : dry [Well-Healed] : well-healed [Abdomen Soft] : soft [Abdomen Tenderness] : non-tender [Abdomen Mass (___ Cm)] : no abdominal mass palpated [Nail Clubbing] : no clubbing of the fingernails [Cyanosis, Localized] : no localized cyanosis [Petechial Hemorrhages (___cm)] : no petechial hemorrhages [] : no ischemic changes

## 2019-12-03 ENCOUNTER — APPOINTMENT (OUTPATIENT)
Dept: OPHTHALMOLOGY | Facility: CLINIC | Age: 74
End: 2019-12-03

## 2019-12-04 ENCOUNTER — FORM ENCOUNTER (OUTPATIENT)
Age: 74
End: 2019-12-04

## 2019-12-04 NOTE — DISCUSSION/SUMMARY
[FreeTextEntry1] : Mr. Vick is a 74 year-old male with chronic atrial fibrillation s/p SJM single chamber pacemaker in the setting of complete heart block.  His device is functioning properly with a 99% ventricular pacing.  Recent ECHO with Dr. Sidhu requested.  With regards to his AF, he remains on Coumadin for stroke prophylaxis.  In addition, he is on digoxin and metoprolol.  However, given his CHB,  I am unsure as to the need of digoxin and I may discontinue it in the future.  He will follow-up in 3-4 months for routine follow-up.

## 2019-12-04 NOTE — HISTORY OF PRESENT ILLNESS
[de-identified] : Mr. Vick is a 74 year-old male with ADRIEL, HTN, BPH, ITP, macrocytosis, and chronic atrial fibrillation who underwent single chamber pacemaker implantation ([employer]) in 2007 for high grade AV block.  He underwent generator replacement in 2017.  Prior care with Dr. Levine.  He reports feeling well and denies chest pain, discomfort over the device site, palpitations, SOB, MARTIN, LE edema, orthopnea, PND, and syncope.  He reports a stable exercise tolerance and compliance with his medication regimen.

## 2019-12-04 NOTE — PROCEDURE
[Pacemaker] : pacemaker [VVIR] : VVIR [Lead Imp:  ___ohms] : lead impedance was [unfilled] ohms [Sensing Amplitude ___mv] : sensing amplitude was [unfilled] mv [___V @] : [unfilled] V [___ ms] : [unfilled] ms [de-identified] : VR ~ 40 bpm [de-identified] : [employer] [de-identified] : 60 [de-identified] : 8.5 years [de-identified] :  98%

## 2019-12-05 ENCOUNTER — OUTPATIENT (OUTPATIENT)
Dept: OUTPATIENT SERVICES | Facility: HOSPITAL | Age: 74
LOS: 1 days | End: 2019-12-05
Payer: MEDICARE

## 2019-12-05 ENCOUNTER — APPOINTMENT (OUTPATIENT)
Dept: CT IMAGING | Facility: HOSPITAL | Age: 74
End: 2019-12-05
Payer: MEDICARE

## 2019-12-05 DIAGNOSIS — Z98.890 OTHER SPECIFIED POSTPROCEDURAL STATES: Chronic | ICD-10-CM

## 2019-12-05 DIAGNOSIS — Z95.0 PRESENCE OF CARDIAC PACEMAKER: Chronic | ICD-10-CM

## 2019-12-05 PROCEDURE — 70487 CT MAXILLOFACIAL W/DYE: CPT

## 2019-12-05 PROCEDURE — 70487 CT MAXILLOFACIAL W/DYE: CPT | Mod: 26

## 2019-12-10 NOTE — REASON FOR VISIT
[Brain Tumor] : brain tumor [Routine Follow-Up] : routine follow-up visit for [Other: ___] : [unfilled] [Spouse] : spouse

## 2019-12-11 ENCOUNTER — APPOINTMENT (OUTPATIENT)
Dept: OTOLARYNGOLOGY | Facility: CLINIC | Age: 74
End: 2019-12-11
Payer: MEDICARE

## 2019-12-11 VITALS
HEART RATE: 82 BPM | WEIGHT: 174 LBS | DIASTOLIC BLOOD PRESSURE: 77 MMHG | OXYGEN SATURATION: 100 % | BODY MASS INDEX: 27.97 KG/M2 | SYSTOLIC BLOOD PRESSURE: 151 MMHG | TEMPERATURE: 98.1 F | HEIGHT: 66 IN

## 2019-12-11 DIAGNOSIS — H81.393 OTHER PERIPHERAL VERTIGO, BILATERAL: ICD-10-CM

## 2019-12-11 DIAGNOSIS — H61.23 IMPACTED CERUMEN, BILATERAL: ICD-10-CM

## 2019-12-11 PROCEDURE — 99214 OFFICE O/P EST MOD 30 MIN: CPT

## 2019-12-11 NOTE — PHYSICAL EXAM
[de-identified] : b tight cerumen impaction [Normal] : palpation of lymph nodes is normal [] : Monroe-Hallpike test is negative [de-identified] : b

## 2019-12-11 NOTE — HISTORY OF PRESENT ILLNESS
[de-identified] : 73 yo man with h/o glomus jugulare, s/p xrt felt severe vertigo, enough to knock him down, when doing plank exercises. He also feels his ears are plugged. has not had more vertigo Here with his wife. He had a repeat scan is wishes to know the results, ordered this week earlier by Dr Rodriguez.

## 2019-12-12 ENCOUNTER — APPOINTMENT (OUTPATIENT)
Dept: RADIATION ONCOLOGY | Facility: CLINIC | Age: 74
End: 2019-12-12
Payer: MEDICARE

## 2019-12-12 VITALS
RESPIRATION RATE: 16 BRPM | BODY MASS INDEX: 28.73 KG/M2 | SYSTOLIC BLOOD PRESSURE: 131 MMHG | WEIGHT: 178 LBS | DIASTOLIC BLOOD PRESSURE: 61 MMHG | OXYGEN SATURATION: 97 % | HEART RATE: 61 BPM

## 2019-12-12 DIAGNOSIS — H91.91 UNSPECIFIED HEARING LOSS, RIGHT EAR: ICD-10-CM

## 2019-12-12 PROCEDURE — 99213 OFFICE O/P EST LOW 20 MIN: CPT

## 2019-12-13 NOTE — PHYSICAL EXAM
[Extraocular Movements] : extraocular movements were intact [Normal] : no joint swelling, no clubbing or cyanosis of the fingernails and muscle strength and tone were normal [No Focal Deficits] : no focal deficits [Oriented To Time, Place, And Person] : oriented to person, place, and time [de-identified] : no abnormalities on external eye exam. [de-identified] : scarring of right TM; right hearing loss [de-identified] : Right hearing loss, CN II-XII otherwise grossly intact; strength 5/5 in B/L upper and lower extremities

## 2019-12-13 NOTE — REVIEW OF SYSTEMS
[Loss of Hearing] : loss of hearing [Negative] : Neurological [Nausea: Grade 0] : Nausea: Grade 0 [Fatigue: Grade 0] : Fatigue: Grade 0 [Tinnitus - Grade 0] : Tinnitus - Grade 0 [Blurred Vision: Grade 0] : Blurred Vision: Grade 0 [Xerostomia: Grade 0] : Xerostomia: Grade 0 [Swollen Glands] : no swollen glands [FreeTextEntry4] :  deaf right ear [de-identified] : vertigo

## 2019-12-13 NOTE — HISTORY OF PRESENT ILLNESS
[FreeTextEntry1] : Mr. Vick received definitive fractionated EBRT 5000cGy/25 fractions to right glomus jugulare tumor from 10/3/16 - 12/8/16. \par \par 12/12/19 - Follow-up\par Mr. Vick returns for routine follow-up.  He was last seen on 5/29/19.  Plan was for DOTATATE PET/CT, f/u with Dr. Sosa to discuss if MRI can be done with pacemaker, f/u with Dr. Martin regarding vestibular therapy.  We confirmed that his pacemaker is NOT MRI compatible.  \par \par DOTATATE PET/CT 6/6/19 - Impression: There is again marked DOTATATE activity in the known right glomus jugulare tumor. No abnormal dilated activity is identified in the orbits to suggest metastatic spread of disease. There is trace DOTATATE activity in the left mastoid region and region that is becoming more sclerotic, probably benign. \par \par CT maxillofacial with contrast 6/10/19 - IMPRESSION: Noncontrast CT shows symmetric soft tissue density of recently discovered orbital masses. The one on the left showed avid enhancement on recent temporal bone CT whereas the right lesion did not. 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 \par Given stability of right glomus tumor, surveillance imaging of both the primary tumor and these orbital lesions may be accomplished with contrast-enhanced CT of the face. \par \par CT maxillofacial with contrast 12/5/19 - IMPRESSION: Stable appearance to bilateral orbital soft tissue nodules, as above. \par \par He last saw Dr. Laguna on 7/15/19.  Plan was for PRN follow-up.  Today, he reports that approximately 2-3 weeks ago his vertigo symptoms change from a feeling of dizziness to room spinning.  He states this caused him to fall once.  He last saw Dr. Martin yesterday and was referred for vestibular rehab.  He otherwise feels well and is without complaints.  He denies fevers, chills, headaches, dyspnea, chest pain, palpitations.  \par \par 5/29/19- FOLLOW UP\par Mr Vick returns today for routine follow up. She was last seen here on 12/18/18. The plan at that time was to return in 6 months for follow up and CT temporal bone w/ contrast, continue biotene, follow up with Cade Gregg, RUSTY and Elda. Encouraged to increase activity. \par \par CT temporal bones 5/23/19 - Continued stability in size and extent of right glomus jugulare tumor compared to May 2018.  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 These show indolent growth and behavior without invasion of the bony orbit and deviation, not invasion, of adjacent muscles. 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 Differential diagnosis may include primary orbital meningioma or possible inflammatory/granulomatous process although these are felt less likely and rarer possibilities. \par \par He last saw Dr. Mohamud for f/u of immune thrombocytopenia and macrocytosis on 4/30/19, platelet count was low but stable at 75,000, H/H and WBC were normal, .2, chemistries unremarkable, rheumatoid factor normal, NORMA +1 320 homogenous.  He is seeing Rheumatologist, Dr. Eloise Mcdonald, and states he was ruled out for rheumatoid arthritis.  He last saw Dr. Martin on 11/9/2018; he was recommended for vestibular rehab, which he has not done.  He is requesting a referral to a vestibular therapist near his home.  He last saw Dr. La (vascular) on 10/10/18.  He last saw PMD, Dr. Chowdary a few months ago.  He is planning to see a new electrophysiologist, Dr. Sosa, for pacemaker management.  Today, he reports feeling well.  He states that his vertigo symptoms are unchanged.  He denies fevers, chills, headaches, visual and hearing disturbances, unilateral weakness, dyspnea, chest pain, palpitations, nausea, vomiting, constipation and diarrhea.  \par \par 12/18/18-Follow up\par At his last visit he was recovering well but continued to complain of fatigue and vertigo. He followed up with Dr. Martin on 11/9/18 for vertigo, and was advised to have vestibular therapy, which the patient declined.  He consulted with Dr. 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 He states that vertigo is better and notes that he declined vestibular therapy. Denies headaches or visual changes. Recently saw opthalmologist and was told eye exam normal. His wife continues to deal with complications of a stroke and now has Graves disease.\par \par \par 11/1/18-Follow up\par At his last visit he complained of vertigo and requested to try meclizine after discussion with his cardiologist. He also had complaints of dry mouth and was using biotene. He was advised to follow up with Dr. Martin.\par \par Notably, he followed up with Derek Martin and Chanel. He consulted with Dr. Sy (vascular) for leg pain and does not require intervention at this time\par \par Today he states that he has felt "total exhaustion" and has had vertigo since August. He has been seen by hematologist Dr. Mohamud for cytopenia of some sort, he does not know what.  He  states that he tried meclizine for 3 days which did not helped. He notes dry mouth for which water helps and occasional headaches. He has followed up with his dentist and PMD. He states that he has an appt. to see Dr. Martin 12/13/2018. He has been working out the last 4 days and feels perkier since then.\par \par \par 6/4/18- Today Mr. Vick presents today for follow up. Since he saw us last in December 2017 he has seen Dr. Martin twice since he saw us- once for cerumen removal and once for concern about dysphagia. \par CT IAC done on 5/31/18 shows decrease in size of right glomus jugulare tumor, previously max dimension 2.7cm, now 2.5cm. \par \par Today he notes dryness of mouth, not using mouth rinses. Using prevident toothpaste but not doing head, mouth and neck exercises. Appetite good. Denies dysphagia. His main concerns are vertigo and dryness. He thinks he has used meclizine in the past but cannot recall.\par \par HISTORY: \par Mr. 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 He has had vertigo for about 9 weeks. He always feels off balance when standing or walking and often feels like the room is spinning. He reports this sensation is at times 10/10 in severity. Walking makes it worse.  However, he noticed after a brief course of steroids early in his diagnosis, a significant decrease in dizziness and improvement in his ability to walk.\par \par He was referred to Dr. Hastings (ENT) by his PCP eventually and imaging with a CT head on 10/6/16 identified a lesion of the right jugular foramen/ jugular bulb, thought to be consistent with a right-sided glomus tumor. There was an expansile permeative lytic lesion centered upon the right jugular foramen with extension medially into the cerebellopontine angle and slightly inferiorly into the upper neck soft tissues measuring approximately 2.8 x 2 x 2.7 cm.  There was partial erosion of the floor of the internal auditory canal and erosion of the carotid canal.  Additionally, there appeared to be erosion overlying the vestibule and posterior semicircular canal.\par \par Of note, in 2015 he had a CT head done at [facility] for headaches and on my review, it appears that the mass may have been present on the scan as well. \par \par PET/CT here at [facility] on 10/11/16 showed area of FDG uptake at right jugular bulb consistent with known tumor.   Also, hypodense lesion was noted in the right lobe of the liver without any significant FDG uptake.\par \par On 10/12/2016, he had audiology testing and was noted to have asymmetry in total discrimination with the right ear being worse.\par \par The patient denies vision changes, facial numbness or weakness, vision changes but does admit to around ~50% hearing loss on the right associate with his vertigo.  He admits to dysphagia with a foreign body sensation in his throat when swallowing liquids and solids. His vertigo prevents him from going about his usual activities. He is typically active and works out with a  twice/wk, but has slowed down since the symptoms began.   \par \par 6/27/17\par CT temporal bone w/ contrast on 3/15/17 showed overall stable glomus tumor, increased in size by 2mm. This was reviewed in tumor board and group consensus was 3 month follow up. CT 6/23/17 shows stable to slightly decreased size of the left jugular tumor. I reviewed his images in neuro-oncology tumor Board yesterday and the recommendation was for CT scan in 6 months.\par He is back to his normal activity. He recently had a battery change to his defibrillator. He has been following regularly with his cardiologist and other physicians. He is wondering if he can have meclizine for vertigo since his symptoms  continue to recur. He was recently in the emergency room for vertigo and his symptoms improved after receiving IV fluids.\par \par 12/4/17-Since his last visit he has continued to follow with Dr. Locke. He has seen Dr. 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 Reports episodic vertigo/dizziness for which he has been seeing Dr. Martin who started him on meclizine with good effect. \par CT brain 11/16/17 findings:   noted lesion measures approximately 2.2cm in craniocaudad dimension, decreased compared to temporal bone CT where it measured 2.6cm. No associated carotid stenosis or occlusion. Jugular vein patent. \par Mr. Vick feels generally well, c/o some fatigue, but is able to stay physically active and exercise throughout the week. Notes occasional vertigo and dizziness.  Appetite is normal, no sore throat or discomfort upon swallowing, but notes dry mouth still remains the same. \par

## 2020-03-03 ENCOUNTER — APPOINTMENT (OUTPATIENT)
Dept: OTOLARYNGOLOGY | Facility: CLINIC | Age: 75
End: 2020-03-03
Payer: MEDICARE

## 2020-03-03 VITALS
OXYGEN SATURATION: 96 % | RESPIRATION RATE: 14 BRPM | HEART RATE: 60 BPM | SYSTOLIC BLOOD PRESSURE: 112 MMHG | DIASTOLIC BLOOD PRESSURE: 61 MMHG | TEMPERATURE: 98.2 F

## 2020-03-03 DIAGNOSIS — H81.391 OTHER PERIPHERAL VERTIGO, RIGHT EAR: ICD-10-CM

## 2020-03-03 PROCEDURE — 99214 OFFICE O/P EST MOD 30 MIN: CPT

## 2020-03-03 NOTE — HISTORY OF PRESENT ILLNESS
[de-identified] : followup 73 yo man with large r glomus jugulare tumor and erosion of the posterior semicircular canal causing positional vertigo. He is doing well with pt and the vertigo is much improved. He just wanted to be examined - here with his wife, who is experiencing hearing loss.

## 2020-03-04 ENCOUNTER — APPOINTMENT (OUTPATIENT)
Dept: OTOLARYNGOLOGY | Facility: CLINIC | Age: 75
End: 2020-03-04

## 2020-05-27 ENCOUNTER — APPOINTMENT (OUTPATIENT)
Dept: HEART AND VASCULAR | Facility: CLINIC | Age: 75
End: 2020-05-27

## 2020-05-28 ENCOUNTER — APPOINTMENT (OUTPATIENT)
Dept: OTOLARYNGOLOGY | Facility: CLINIC | Age: 75
End: 2020-05-28

## 2020-06-08 ENCOUNTER — APPOINTMENT (OUTPATIENT)
Dept: OTOLARYNGOLOGY | Facility: CLINIC | Age: 75
End: 2020-06-08
Payer: MEDICARE

## 2020-06-08 VITALS
TEMPERATURE: 98 F | HEART RATE: 61 BPM | DIASTOLIC BLOOD PRESSURE: 63 MMHG | SYSTOLIC BLOOD PRESSURE: 147 MMHG | OXYGEN SATURATION: 95 %

## 2020-06-08 PROCEDURE — 95992 CANALITH REPOSITIONING PROC: CPT | Mod: LT

## 2020-06-08 PROCEDURE — 99213 OFFICE O/P EST LOW 20 MIN: CPT | Mod: 25

## 2020-06-09 NOTE — PHYSICAL EXAM
[] : [place]-Hallpike test is positive [de-identified] : R EAC with cerumen impaction copious could not tolerate removal [Normal] : external appearance is normal [de-identified] : left [de-identified] : steady gait

## 2020-06-09 NOTE — PROCEDURE
[Risk and Benefits Discussed] : The purpose, risks, discomforts, benefits and alternatives of the procedure have been explained to the patient including no treatment. [Same] : same as the Pre Op Dx. [FreeTextEntry1] : see subjective [] : Epley Maneuver [FreeTextEntry5] : epley maneuver performed [FreeTextEntry6] : epley maneuver performed with resolution of nystagmus after first cycle

## 2020-06-09 NOTE — HISTORY OF PRESENT ILLNESS
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 Patient reports the last three weeks he has had vertigo anytime he gets out of bed. He says it lasts a few minutes and subsides. He admits to some popping noises in the left ear when he swallows. He denies any f/c/s, otalgia, otorrhea, hearing changes. No other ENT complaints. No pertinent FH/Sh. the vertigo is short-lived but severe

## 2020-06-09 NOTE — ASSESSMENT
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

## 2020-06-15 ENCOUNTER — APPOINTMENT (OUTPATIENT)
Dept: OTOLARYNGOLOGY | Facility: CLINIC | Age: 75
End: 2020-06-15
Payer: MEDICARE

## 2020-06-15 VITALS
OXYGEN SATURATION: 97 % | DIASTOLIC BLOOD PRESSURE: 75 MMHG | SYSTOLIC BLOOD PRESSURE: 148 MMHG | HEART RATE: 66 BPM | TEMPERATURE: 96.7 F

## 2020-06-15 VITALS
OXYGEN SATURATION: 100 % | HEART RATE: 66 BPM | TEMPERATURE: 98.2 F | DIASTOLIC BLOOD PRESSURE: 75 MMHG | SYSTOLIC BLOOD PRESSURE: 148 MMHG

## 2020-06-15 DIAGNOSIS — H81.12 BENIGN PAROXYSMAL VERTIGO, LEFT EAR: ICD-10-CM

## 2020-06-15 PROCEDURE — 99213 OFFICE O/P EST LOW 20 MIN: CPT

## 2020-06-15 NOTE — PHYSICAL EXAM
[Normal] : external appearance is normal [] : [place]-Hallpike test is negative [de-identified] : gait steady

## 2020-06-15 NOTE — HISTORY OF PRESENT ILLNESS
[de-identified] : followup 73 yo man with bppv and who underwent crpm last week. He has had no more vertigo. he has h/o glomus jugulare tumor and is trying to get followup with Dr Rodriguez. He denies any further vertigo.

## 2020-06-22 ENCOUNTER — APPOINTMENT (OUTPATIENT)
Dept: ORTHOPEDIC SURGERY | Facility: CLINIC | Age: 75
End: 2020-06-22
Payer: MEDICARE

## 2020-06-22 VITALS — RESPIRATION RATE: 16 BRPM | HEIGHT: 66 IN | BODY MASS INDEX: 28.61 KG/M2 | WEIGHT: 178 LBS

## 2020-06-22 DIAGNOSIS — Z00.00 ENCOUNTER FOR GENERAL ADULT MEDICAL EXAMINATION W/OUT ABNORMAL FINDINGS: ICD-10-CM

## 2020-06-22 PROCEDURE — 73120 X-RAY EXAM OF HAND: CPT | Mod: 50

## 2020-06-22 PROCEDURE — 99203 OFFICE O/P NEW LOW 30 MIN: CPT | Mod: 25

## 2020-06-22 PROCEDURE — 20550 NJX 1 TENDON SHEATH/LIGAMENT: CPT | Mod: F7

## 2020-06-23 ENCOUNTER — APPOINTMENT (OUTPATIENT)
Dept: HEART AND VASCULAR | Facility: CLINIC | Age: 75
End: 2020-06-23
Payer: MEDICARE

## 2020-06-23 VITALS
HEART RATE: 60 BPM | OXYGEN SATURATION: 96 % | TEMPERATURE: 99 F | BODY MASS INDEX: 27.97 KG/M2 | DIASTOLIC BLOOD PRESSURE: 60 MMHG | SYSTOLIC BLOOD PRESSURE: 124 MMHG | HEIGHT: 66 IN | WEIGHT: 174 LBS

## 2020-06-23 PROCEDURE — 93279 PRGRMG DEV EVAL PM/LDLS PM: CPT

## 2020-06-23 PROCEDURE — 99213 OFFICE O/P EST LOW 20 MIN: CPT | Mod: 25

## 2020-06-29 NOTE — DISCUSSION/SUMMARY
[FreeTextEntry1] : Mr. Vick is a 74 year-old male with chronic atrial fibrillation s/p SJM single chamber pacemaker in the setting of complete heart block.  His device is functioning properly with a 98% ventricular pacing.  Latest Echo (10/2019) showed LVEF 58%.  With regards to his AF, he remains on Coumadin for stroke prophylaxis.  \par F/u in 6 months.

## 2020-06-29 NOTE — PROCEDURE
[VVIR] : VVIR [Pacemaker] : pacemaker [Lead Imp:  ___ohms] : lead impedance was [unfilled] ohms [Longevity: ___ months] : The estimated remaining battery life is [unfilled] months [___V @] : [unfilled] V [Sensing Amplitude ___mv] : sensing amplitude was [unfilled] mv [___ ms] : [unfilled] ms [None] : none [de-identified] : VR ~ 40 bpm [de-identified] : [place] [de-identified] : Assurity [id number] [de-identified] : [id number] [de-identified] : 4/27/17 [de-identified] : 59 [de-identified] : 10-11 years [de-identified] :  98%

## 2020-06-29 NOTE — HISTORY OF PRESENT ILLNESS
[de-identified] : Mr. Vick is a 74 year-old male with ADRIEL, HTN, BPH, ITP, macrocytosis, and chronic atrial fibrillation who underwent single chamber pacemaker implantation ([employer] [place]) in 2007 for high grade AV block.  He underwent generator replacement in 2017.  Prior care with Dr. Levine.  He reports feeling well and denies chest pain, discomfort over the device site, palpitations, SOB, MARTIN, LE edema, orthopnea, PND, and syncope.  He reports a stable exercise tolerance and compliance with his medication regimen.  \par Pt f/u with Dr. Sidhu for general cardiology management. \par Latest Echo with Dr. Sidhu on 10/2019 showed LVEF 58%, basal septal hypertrophy, LA enlargement. No significant valvular disease.

## 2020-07-13 ENCOUNTER — APPOINTMENT (OUTPATIENT)
Dept: HEMATOLOGY ONCOLOGY | Facility: CLINIC | Age: 75
End: 2020-07-13
Payer: MEDICARE

## 2020-07-13 VITALS
WEIGHT: 176 LBS | BODY MASS INDEX: 28.28 KG/M2 | SYSTOLIC BLOOD PRESSURE: 132 MMHG | OXYGEN SATURATION: 99 % | HEART RATE: 63 BPM | DIASTOLIC BLOOD PRESSURE: 80 MMHG | TEMPERATURE: 97.1 F | HEIGHT: 66 IN

## 2020-07-13 LAB
BASOPHILS # BLD AUTO: 0.02 K/UL
BASOPHILS NFR BLD AUTO: 0.4 %
EOSINOPHIL # BLD AUTO: 0.02 K/UL
EOSINOPHIL NFR BLD AUTO: 0.4 %
HCT VFR BLD CALC: 43.5 %
HGB BLD-MCNC: 13.8 G/DL
IMM GRANULOCYTES NFR BLD AUTO: 0.4 %
LYMPHOCYTES # BLD AUTO: 1.46 K/UL
LYMPHOCYTES NFR BLD AUTO: 26.6 %
MAN DIFF?: NORMAL
MCHC RBC-ENTMCNC: 31.7 GM/DL
MCHC RBC-ENTMCNC: 33.6 PG
MCV RBC AUTO: 105.8 FL
MONOCYTES # BLD AUTO: 0.3 K/UL
MONOCYTES NFR BLD AUTO: 5.5 %
NEUTROPHILS # BLD AUTO: 3.66 K/UL
NEUTROPHILS NFR BLD AUTO: 66.7 %
PLATELET # BLD AUTO: 60 K/UL
RBC # BLD: 4.11 M/UL
RBC # BLD: 4.11 M/UL
RBC # FLD: 14.6 %
RETICS # AUTO: 1.6 %
RETICS AGGREG/RBC NFR: 64.9 K/UL
WBC # FLD AUTO: 5.48 K/UL

## 2020-07-13 PROCEDURE — 36415 COLL VENOUS BLD VENIPUNCTURE: CPT

## 2020-07-13 PROCEDURE — 99214 OFFICE O/P EST MOD 30 MIN: CPT | Mod: 25

## 2020-07-13 NOTE — PHYSICAL EXAM
[Normal] : affect appropriate [de-identified] : pacemaker left anterior chest wall, RRR. S1, S2, murmur [de-identified] : fading ecchymoses left and right upper extremity, dorsum of right foot

## 2020-07-13 NOTE — REVIEW OF SYSTEMS
[Fatigue] : fatigue [Easy Bruising] : a tendency for easy bruising [Negative] : Allergic/Immunologic [Fever] : no fever [Night Sweats] : no night sweats [Chills] : no chills [Recent Change In Weight] : ~T no recent weight change [Easy Bleeding] : no tendency for easy bleeding [Swollen Glands] : no swollen glands [de-identified] : numbness of both feet [FreeTextEntry9] : recent injection for painful trigger finger

## 2020-07-13 NOTE — HISTORY OF PRESENT ILLNESS
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 Patient's previous blood results from his last visit in November 2019 revealed that the platelets were decreased but stable at 73,000 and the MCV was 105.6. The hemoglobin, hematocrit, and white blood cell counts were all normal. The chemistries were unremarkable. The patient saw Dr. Chowdary a few weeks ago and was found to have a platelet count of 51,000 and an MCV of 110.7 with a normal hemoglobin , hematocrit and white blood count. Patient remains on Warfarin for his atrial fibrillation. The patient recently had a steroid injection for a painful trigger finger. He has numbness of both feet and is seeing a neurologist. He also complains of extreme fatigue, easy bruising, more frequent of late. Denies excessive bleeding, hematuria, hematochezia, epistaxis fever, chills, or sweats.  Of note, patient very recently tested negative for COVID-19 by serum test and nasal swab.\par \par \par

## 2020-07-13 NOTE — ASSESSMENT
[FreeTextEntry1] : \par Patient is a 74 year old male with multiple medical problems who now presents for hematologic follow-up for immune thrombocytopenia, fatigue and macrocytosis. Patient has had positive NORMA in the past. Patient recently had a drop in platelets from his usual baseline of 70,000-80,000 to 51,000. MCV has also increased. Discussed possibly of bone marrow studies to evaluate for other conditions (MDS). Ecchymoses due to treatment with warfarin (recent INR 2.31) and thrombocytopenia.\par Have ordered CBC, reticulocyte count, CMP, Cardiolipin antibody panel, B2GP1 antibody panel, NORMA, Rheumatoid factor, B12, folate, iron panel and ferritin. Patient was advised to call later this week to discuss results and next appointment date.

## 2020-07-14 ENCOUNTER — LABORATORY RESULT (OUTPATIENT)
Age: 75
End: 2020-07-14

## 2020-07-14 LAB
ALBUMIN SERPL ELPH-MCNC: 4.9 G/DL
ALP BLD-CCNC: 79 U/L
ALT SERPL-CCNC: 36 U/L
ANA PAT FLD IF-IMP: NORMAL
ANA SER IF-ACNC: ABNORMAL
ANION GAP SERPL CALC-SCNC: 15 MMOL/L
AST SERPL-CCNC: 29 U/L
BILIRUB SERPL-MCNC: 0.8 MG/DL
BUN SERPL-MCNC: 12 MG/DL
CALCIUM SERPL-MCNC: 9.6 MG/DL
CARDIOLIPIN AB SER IA-ACNC: NEGATIVE
CHLORIDE SERPL-SCNC: 103 MMOL/L
CO2 SERPL-SCNC: 22 MMOL/L
CREAT SERPL-MCNC: 0.76 MG/DL
FERRITIN SERPL-MCNC: 44 NG/ML
FOLATE SERPL-MCNC: 14 NG/ML
GLUCOSE SERPL-MCNC: 94 MG/DL
IRON SATN MFR SERPL: 24 %
IRON SERPL-MCNC: 82 UG/DL
POTASSIUM SERPL-SCNC: 4.4 MMOL/L
PROT SERPL-MCNC: 7.1 G/DL
RHEUMATOID FACT SER QL: 12 IU/ML
SODIUM SERPL-SCNC: 140 MMOL/L
TIBC SERPL-MCNC: 336 UG/DL
UIBC SERPL-MCNC: 255 UG/DL
VIT B12 SERPL-MCNC: 788 PG/ML

## 2020-07-15 LAB
B2 GLYCOPROT1 IGA SERPL IA-ACNC: 7.9 SAU
B2 GLYCOPROT1 IGG SER-ACNC: 6.2 SGU
B2 GLYCOPROT1 IGM SER-ACNC: <5 SMU
CARDIOLIPIN IGM SER-MCNC: 5 MPL
CARDIOLIPIN IGM SER-MCNC: 5.5 GPL
DEPRECATED CARDIOLIPIN IGA SER: <5 APL

## 2020-07-16 ENCOUNTER — NON-APPOINTMENT (OUTPATIENT)
Age: 75
End: 2020-07-16

## 2020-07-16 ENCOUNTER — APPOINTMENT (OUTPATIENT)
Dept: OPHTHALMOLOGY | Facility: CLINIC | Age: 75
End: 2020-07-16
Payer: MEDICARE

## 2020-07-16 PROCEDURE — 92012 INTRM OPH EXAM EST PATIENT: CPT

## 2020-07-16 PROCEDURE — 92133 CPTRZD OPH DX IMG PST SGM ON: CPT

## 2020-07-29 ENCOUNTER — APPOINTMENT (OUTPATIENT)
Dept: VASCULAR SURGERY | Facility: CLINIC | Age: 75
End: 2020-07-29
Payer: MEDICARE

## 2020-07-29 VITALS — DIASTOLIC BLOOD PRESSURE: 83 MMHG | SYSTOLIC BLOOD PRESSURE: 149 MMHG | HEART RATE: 121 BPM

## 2020-07-29 DIAGNOSIS — R20.0 ANESTHESIA OF SKIN: ICD-10-CM

## 2020-07-29 PROCEDURE — 99214 OFFICE O/P EST MOD 30 MIN: CPT

## 2020-08-04 ENCOUNTER — APPOINTMENT (OUTPATIENT)
Dept: OPHTHALMOLOGY | Facility: CLINIC | Age: 75
End: 2020-08-04
Payer: MEDICARE

## 2020-08-04 ENCOUNTER — NON-APPOINTMENT (OUTPATIENT)
Age: 75
End: 2020-08-04

## 2020-08-04 PROCEDURE — 99441: CPT | Mod: 95

## 2020-08-06 NOTE — HISTORY OF PRESENT ILLNESS
[FreeTextEntry1] : 75 yo M with h/o bilateral ablation of GSV for varicose veins presents for follow up. He has chronic left foot numbness but no diabetes. He has recently developed right foot numbness. No right foot pain or weakness. He admits to arthritis in all his joints including his back. No bladder or bowel incontinence. No left leg pain or weakness. No leg swelling, no ulcerations. He occasionally uses compression stockings.

## 2020-08-06 NOTE — CONSULT LETTER
[Dear  ___] : Dear  [unfilled], [FreeTextEntry1] : I saw Mr. Mariam Vick for followup. As you may recall, he has history of ablation of both greater saphenous veins at another facility. He presents with concerns of right foot numbness; also, reports chronic numbness on the left foot which he has mentioned in the past. Denies any history of neuropathy or diabetes, leg swelling, leg heaviness, ulcers, symptomatic varicose veins, claudication or rest pain. He does reports a long standing history of arthritis of the back. \par \par On exam, he has palpable pulses. Adequate sensation on both feet. No bulging varicosities or edema. \par \par Mr. Vick has a history of chronic venous insufficiency and greater saphenous vein ablations in the past. The feet numbness is likely related to his degenerative arthritic spine disease. We recommend x-rays of his lumbar spine for further workup. He will follow up with you for this matter. No vascular problems at this time. He should stay active and use his compression stockings daily. He may follow-up here as needed. \par \par My complete EMR office note is below for your records.  [FreeTextEntry2] : Melchor Chowdary MD\par [address] [place] [address], [address]\[place] [address]\par \par Silvano Sidhu MD\[facility] [address] [place] [address], [facility] [address]\[place] [address]  [FreeTextEntry3] : Sincerely, \par \par Markel La M.D. \par , Surgical Services [facility]\par , Department of Surgery [facility]\par Professor of Surgery, [employer] at [facility]

## 2020-08-06 NOTE — ASSESSMENT
[FreeTextEntry1] : 75 yo M with chronic venous insufficiency s/p bilateral GSV ablations, now with numbness in right foot toes. He also has chronic numbness in left foot toes. 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 We recommend xrays of his lumbar spine for further workup. He will follow up with his PCP for this matter. No vascular problems at this time. ON exam, Oziel gaitane well perfused with palpable pulses and sensation is intact on both feet. He should continue compression stockings as needed. He may f/u as needed.

## 2020-08-06 NOTE — PHYSICAL EXAM
[Respiratory Effort] : normal respiratory effort [Normal Rate and Rhythm] : normal rate and rhythm [2+] : left 2+ [No Rash or Lesion] : No rash or lesion [Alert] : alert [Oriented to Person] : oriented to person [Oriented to Place] : oriented to place [Oriented to Time] : oriented to time [Calm] : calm [Ankle Swelling On The Right] : mild [Varicose Veins Of Lower Extremities] : present [] : not present [Ankle Swelling (On Exam)] : not present [JVD] : no jugular venous distention  [de-identified] : NAD [de-identified] : NC/AT [Abdomen Tenderness] : ~T ~M No abdominal tenderness [de-identified] : normal sensation on exam on bilateral feet [de-identified] : supple [de-identified] : normal strength

## 2020-09-14 ENCOUNTER — APPOINTMENT (OUTPATIENT)
Dept: HEMATOLOGY ONCOLOGY | Facility: CLINIC | Age: 75
End: 2020-09-14
Payer: MEDICARE

## 2020-09-14 VITALS
OXYGEN SATURATION: 98 % | DIASTOLIC BLOOD PRESSURE: 78 MMHG | HEART RATE: 60 BPM | WEIGHT: 176 LBS | SYSTOLIC BLOOD PRESSURE: 130 MMHG | HEIGHT: 66 IN | BODY MASS INDEX: 28.28 KG/M2 | TEMPERATURE: 97.5 F

## 2020-09-14 LAB
BASOPHILS # BLD AUTO: 0.02 K/UL
BASOPHILS NFR BLD AUTO: 0.4 %
EOSINOPHIL # BLD AUTO: 0.01 K/UL
EOSINOPHIL NFR BLD AUTO: 0.2 %
HCT VFR BLD CALC: 43.2 %
HGB BLD-MCNC: 13.7 G/DL
IMM GRANULOCYTES NFR BLD AUTO: 0.4 %
LYMPHOCYTES # BLD AUTO: 1.85 K/UL
LYMPHOCYTES NFR BLD AUTO: 34.4 %
MAN DIFF?: NORMAL
MCHC RBC-ENTMCNC: 31.7 GM/DL
MCHC RBC-ENTMCNC: 33.5 PG
MCV RBC AUTO: 105.6 FL
MONOCYTES # BLD AUTO: 0.33 K/UL
MONOCYTES NFR BLD AUTO: 6.1 %
NEUTROPHILS # BLD AUTO: 3.15 K/UL
NEUTROPHILS NFR BLD AUTO: 58.5 %
PLATELET # BLD AUTO: 68 K/UL
RBC # BLD: 4.09 M/UL
RBC # BLD: 4.09 M/UL
RBC # FLD: 14.8 %
RETICS # AUTO: 1.5 %
RETICS AGGREG/RBC NFR: 62.2 K/UL
WBC # FLD AUTO: 5.38 K/UL

## 2020-09-14 PROCEDURE — 99214 OFFICE O/P EST MOD 30 MIN: CPT | Mod: 25

## 2020-09-14 PROCEDURE — 36415 COLL VENOUS BLD VENIPUNCTURE: CPT

## 2020-09-14 RX ORDER — CYANOCOBALAMIN (VITAMIN B-12) 1000 MCG
1000 TABLET ORAL
Refills: 0 | Status: DISCONTINUED | COMMUNITY
End: 2020-09-14

## 2020-09-14 NOTE — ASSESSMENT
[FreeTextEntry1] : Patient is a 74 year old male with a history multiple medical problems who now presents for hematologic follow-up for immune thrombocytopenia, fatigue and macrocytosis. Patient had weakly positive NORMA. Patient recently had a drop in platelets from his usual baseline of 70,000 - 80,000 to 60,000. MCV has also increased. Discussed possibly of bone marrow studies to evaluate for other conditions (MDS) but patient wishes to defer for the time being.. Ecchymoses due to treatment with Warfarin  and thrombocytopenia. Have ordered NORMA, B12 and folate, CBC, CMP, ferritin, iron panel, reticulocyte count, rheumatoid factor, and sedimentation rate. Patient was advised to call office to discuss results and next appointment date.

## 2020-09-14 NOTE — END OF VISIT
[FreeTextEntry3] : All medical record entries made by the Scribe were at my, Dr. Debby Mohamud, direction and personally dictated by me on 09/14/2020. I have reviewed the chart and agree that the record accurately reflects my personal performance of the history, physical exam, assessment and plan. I have also personally directed, reviewed, and agreed with the chart.

## 2020-09-14 NOTE — PHYSICAL EXAM
[Normal] : affect appropriate [de-identified] : pacemaker left anterior chest wall, RRR. S1, S2, murmur [de-identified] : fading ecchymoses right upper extremity

## 2020-09-14 NOTE — HISTORY OF PRESENT ILLNESS
[de-identified] : Patient is a 74 year old male with a history of multiple medical problems who now presents for hematologic follow-up for immune thrombocytopenia, fatigue and macrocytosis. Patient's previous blood results from his July 2020 revealed that the platelets were decreased at 60,000 and the MCV was 105.8. The hemoglobin, hematocrit, and white blood cell counts were all normal. The NORMA was weakly positive at 1::80 Cardiolipin antibodies and beta 2 glycoprotein 1  antibodies were negative. Metabolic panel was completely normal. B12, folate, iron panel and ferritin were also normal. Patient remains on Warfarin for his atrial fibrillation. Since discontinuing Finasteride, patient reports feeling less dizziness. He complains of arthritis and numbness in hands and feet, fatigue and easy bruising. Denies excessive bleeding, hematuria, hematochezia, epistaxis, fever, chills, or sweats.

## 2020-09-14 NOTE — REVIEW OF SYSTEMS
[Fatigue] : fatigue [Joint Pain] : joint pain [Joint Stiffness] : joint stiffness [Easy Bruising] : a tendency for easy bruising [Negative] : Allergic/Immunologic [Easy Bleeding] : no tendency for easy bleeding [FreeTextEntry9] : arthritis, especially in hands and feet [de-identified] : numbness in fingers, toes, feet

## 2020-09-15 ENCOUNTER — APPOINTMENT (OUTPATIENT)
Dept: CT IMAGING | Facility: HOSPITAL | Age: 75
End: 2020-09-15
Payer: MEDICARE

## 2020-09-15 ENCOUNTER — RESULT REVIEW (OUTPATIENT)
Age: 75
End: 2020-09-15

## 2020-09-15 ENCOUNTER — LABORATORY RESULT (OUTPATIENT)
Age: 75
End: 2020-09-15

## 2020-09-15 ENCOUNTER — OUTPATIENT (OUTPATIENT)
Dept: OUTPATIENT SERVICES | Facility: HOSPITAL | Age: 75
LOS: 1 days | End: 2020-09-15
Payer: MEDICARE

## 2020-09-15 ENCOUNTER — APPOINTMENT (OUTPATIENT)
Dept: HEMATOLOGY ONCOLOGY | Facility: CLINIC | Age: 75
End: 2020-09-15

## 2020-09-15 DIAGNOSIS — Z98.890 OTHER SPECIFIED POSTPROCEDURAL STATES: Chronic | ICD-10-CM

## 2020-09-15 DIAGNOSIS — Z95.0 PRESENCE OF CARDIAC PACEMAKER: Chronic | ICD-10-CM

## 2020-09-15 LAB
ALBUMIN SERPL ELPH-MCNC: 4.7 G/DL
ALP BLD-CCNC: 87 U/L
ALT SERPL-CCNC: 26 U/L
ANION GAP SERPL CALC-SCNC: 12 MMOL/L
AST SERPL-CCNC: 26 U/L
BILIRUB SERPL-MCNC: 0.6 MG/DL
BUN SERPL-MCNC: 11 MG/DL
CALCIUM SERPL-MCNC: 9.4 MG/DL
CHLORIDE SERPL-SCNC: 104 MMOL/L
CO2 SERPL-SCNC: 27 MMOL/L
CREAT SERPL-MCNC: 0.79 MG/DL
ERYTHROCYTE [SEDIMENTATION RATE] IN BLOOD BY WESTERGREN METHOD: 11 MM/HR
FERRITIN SERPL-MCNC: 68 NG/ML
FOLATE SERPL-MCNC: 13.7 NG/ML
GLUCOSE SERPL-MCNC: 97 MG/DL
IRON SATN MFR SERPL: 25 %
IRON SERPL-MCNC: 72 UG/DL
POTASSIUM SERPL-SCNC: 4.4 MMOL/L
PROT SERPL-MCNC: 6.9 G/DL
RHEUMATOID FACT SER QL: <10 IU/ML
SODIUM SERPL-SCNC: 143 MMOL/L
TIBC SERPL-MCNC: 291 UG/DL
UIBC SERPL-MCNC: 220 UG/DL
VIT B12 SERPL-MCNC: 698 PG/ML

## 2020-09-15 PROCEDURE — 70487 CT MAXILLOFACIAL W/DYE: CPT

## 2020-09-15 PROCEDURE — 70487 CT MAXILLOFACIAL W/DYE: CPT | Mod: 26

## 2020-09-17 ENCOUNTER — APPOINTMENT (OUTPATIENT)
Dept: RADIATION ONCOLOGY | Facility: CLINIC | Age: 75
End: 2020-09-17
Payer: MEDICARE

## 2020-09-17 VITALS
DIASTOLIC BLOOD PRESSURE: 72 MMHG | HEART RATE: 72 BPM | RESPIRATION RATE: 15 BRPM | SYSTOLIC BLOOD PRESSURE: 157 MMHG | BODY MASS INDEX: 28.15 KG/M2 | TEMPERATURE: 97.8 F | OXYGEN SATURATION: 100 % | WEIGHT: 174.4 LBS

## 2020-09-17 LAB
ANA PAT FLD IF-IMP: ABNORMAL
ANA PATTERN: ABNORMAL
ANA SER IF-ACNC: ABNORMAL
ANA TITER: ABNORMAL

## 2020-09-17 PROCEDURE — 99214 OFFICE O/P EST MOD 30 MIN: CPT

## 2020-09-17 RX ORDER — TADALAFIL 5 MG/1
5 TABLET ORAL
Refills: 0 | Status: DISCONTINUED | COMMUNITY
Start: 2018-11-01 | End: 2020-09-17

## 2020-09-21 ENCOUNTER — APPOINTMENT (OUTPATIENT)
Dept: ORTHOPEDIC SURGERY | Facility: CLINIC | Age: 75
End: 2020-09-21
Payer: MEDICARE

## 2020-09-21 VITALS — RESPIRATION RATE: 16 BRPM | WEIGHT: 174 LBS | BODY MASS INDEX: 27.97 KG/M2 | HEIGHT: 66 IN

## 2020-09-21 DIAGNOSIS — M65.321 TRIGGER FINGER, RIGHT INDEX FINGER: ICD-10-CM

## 2020-09-21 DIAGNOSIS — M65.332 TRIGGER FINGER, LEFT MIDDLE FINGER: ICD-10-CM

## 2020-09-21 DIAGNOSIS — M65.331 TRIGGER FINGER, RIGHT MIDDLE FINGER: ICD-10-CM

## 2020-09-21 PROCEDURE — 20550 NJX 1 TENDON SHEATH/LIGAMENT: CPT | Mod: F6

## 2020-09-21 PROCEDURE — 99214 OFFICE O/P EST MOD 30 MIN: CPT | Mod: 25

## 2020-09-22 NOTE — HISTORY OF PRESENT ILLNESS
[FreeTextEntry1] : Mr. Vick received definitive fractionated EBRT 5000cGy/25 fractions to right glomus jugulare tumor from 10/3/16 - 12/8/16. \par \par 9/17/20 - Follow-up\par Mr. Vick returns for routine follow-up.  He was last seen on 12/12/19.  Plan was for CT maxillofacial and f/u in 9 months, continue f/u with Dr. Martin, encouraged vestibular therapy, continue f/u with cardiology and hematology.  \par \par CT maxillofacial 9/15/20\par - Stable appearance of right glomus jugulare. \par - Variable size of enhancing lesion of the left orbit supports vasoformative etiology; no significant interval change is identified in bilateral orbital lesions, as above. \par \par He last saw Dr. Martin on 6/15/20.  Plan is for f/u in December.  He states he had vestibular therapy and removal of cerumen.  He states room spinning has resolved.  He continues to have intermittent dizziness.  He recently switched finasteride to tolterodine and discontinued Cialis with some improvement.  He last saw cardiology Dr. Sosa on 6/23.  He last saw hematology Dr. Mohamud on 9/14/20.  Today, he reports feeling well overall and is without complaints. \par \par 12/12/19 - Follow-up\par Mr. Vick returns for routine follow-up.  He was last seen on 5/29/19.  Plan was for DOTATATE PET/CT, f/u with Dr. Sosa to discuss if MRI can be done with pacemaker, f/u with Dr. Martin regarding vestibular therapy.  We confirmed that his pacemaker is NOT MRI compatible.  \par \par DOTATATE PET/CT 6/6/19 - Impression: There is again marked DOTATATE activity in the known right glomus jugulare tumor. No abnormal dilated activity is identified in the orbits to suggest metastatic spread of disease. There is trace DOTATATE activity in the left mastoid region and region that is becoming more sclerotic, probably benign. \par \par CT maxillofacial with contrast 6/10/19 - IMPRESSION: Noncontrast CT shows symmetric soft tissue density of recently discovered orbital masses. The one on the left showed avid enhancement on recent temporal bone CT whereas the right lesion did not. 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 \par Given stability of right glomus tumor, surveillance imaging of both the primary tumor and these orbital lesions may be accomplished with contrast-enhanced CT of the face. \par \par CT maxillofacial with contrast 12/5/19 - IMPRESSION: Stable appearance to bilateral orbital soft tissue nodules, as above. \par \par He last saw Dr. Laguna on 7/15/19.  Plan was for PRN follow-up.  Today, he reports that approximately 2-3 weeks ago his vertigo symptoms change from a feeling of dizziness to room spinning.  He states this caused him to fall once.  He last saw Dr. Martin yesterday and was referred for vestibular rehab.  He otherwise feels well and is without complaints.  He denies fevers, chills, headaches, dyspnea, chest pain, palpitations.  \par \par 5/29/19- FOLLOW UP\par Mr Vick returns today for routine follow up. She was last seen here on 12/18/18. The plan at that time was to return in 6 months for follow up and CT temporal bone w/ contrast, continue biotene, follow up with Cade Gregg, RUSTY and Elda. Encouraged to increase activity. \par \par CT temporal bones 5/23/19 - Continued stability in size and extent of right glomus jugulare tumor compared to May 2018.  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 These show indolent growth and behavior without invasion of the bony orbit and deviation, not invasion, of adjacent muscles. 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 Differential diagnosis may include primary orbital meningioma or possible inflammatory/granulomatous process although these are felt less likely and rarer possibilities. \par \par He last saw Dr. Mohamud for f/u of immune thrombocytopenia and macrocytosis on 4/30/19, platelet count was low but stable at 75,000, H/H and WBC were normal, .2, chemistries unremarkable, rheumatoid factor normal, NORMA +1 320 homogenous.  He is seeing Rheumatologist, Dr. Eloise Mcdonald, and states he was ruled out for rheumatoid arthritis.  He last saw Dr. Martin on 11/9/2018; he was recommended for vestibular rehab, which he has not done.  He is requesting a referral to a vestibular therapist near his home.  He last saw Dr. La (vascular) on 10/10/18.  He last saw PMD, Dr. Chowdary a few months ago.  He is planning to see a new electrophysiologist, Dr. Sosa, for pacemaker management.  Today, he reports feeling well.  He states that his vertigo symptoms are unchanged.  He denies fevers, chills, headaches, visual and hearing disturbances, unilateral weakness, dyspnea, chest pain, palpitations, nausea, vomiting, constipation and diarrhea.  \par \par 12/18/18-Follow up\par At his last visit he was recovering well but continued to complain of fatigue and vertigo. He followed up with Dr. Martin on 11/9/18 for vertigo, and was advised to have vestibular therapy, which the patient declined.  He consulted with Dr. 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 He states that vertigo is better and notes that he declined vestibular therapy. Denies headaches or visual changes. Recently saw opthalmologist and was told eye exam normal. His wife continues to deal with complications of a stroke and now has Graves disease.\par \par \par 11/1/18-Follow up\par At his last visit he complained of vertigo and requested to try meclizine after discussion with his cardiologist. He also had complaints of dry mouth and was using biotene. He was advised to follow up with Dr. Martin.\par \par Notably, he followed up with Derek Martin and Chanel. He consulted with Dr. Sy (vascular) for leg pain and does not require intervention at this time\par \par Today he states that he has felt "total exhaustion" and has had vertigo since August. He has been seen by hematologist Dr. Mohamud for cytopenia of some sort, he does not know what.  He  states that he tried meclizine for 3 days which did not helped. He notes dry mouth for which water helps and occasional headaches. He has followed up with his dentist and PMD. He states that he has an appt. to see Dr. Martin 12/13/2018. He has been working out the last 4 days and feels perkier since then.\par \par \par 6/4/18- Today Mr. Vick presents today for follow up. Since he saw us last in December 2017 he has seen Dr. Martin twice since he saw us- once for cerumen removal and once for concern about dysphagia. \par CT IAC done on 5/31/18 shows decrease in size of right glomus jugulare tumor, previously max dimension 2.7cm, now 2.5cm. \par \par Today he notes dryness of mouth, not using mouth rinses. Using prevident toothpaste but not doing head, mouth and neck exercises. Appetite good. Denies dysphagia. His main concerns are vertigo and dryness. He thinks he has used meclizine in the past but cannot recall.\par \par HISTORY: \par Mr. Vick is a 70 yo gentleman who recently received a radiologic diagnosis of right glomus jugulare tumor. He has had vertigo for about 9 weeks. He always feels off balance when standing or walking and often feels like the room is spinning. He reports this sensation is at times 10/10 in severity. Walking makes it worse.  However, he noticed after a brief course of steroids early in his diagnosis, a significant decrease in dizziness and improvement in his ability to walk.\par \par He was referred to Dr. Hastings (ENT) by his PCP eventually and imaging with a CT head on 10/6/16 identified a lesion of the right jugular foramen/ jugular bulb, thought to be consistent with a right-sided glomus tumor. There was an expansile permeative lytic lesion centered upon the right jugular foramen with extension medially into the cerebellopontine angle and slightly inferiorly into the upper neck soft tissues measuring approximately 2.8 x 2 x 2.7 cm.  There was partial erosion of the floor of the internal auditory canal and erosion of the carotid canal.  Additionally, there appeared to be erosion overlying the vestibule and posterior semicircular canal.\par \par Of note, in 2015 he had a CT head done at [facility] for headaches and on my review, it appears that the mass may have been present on the scan as well. \par \par PET/CT here at [facility] on 10/11/16 showed area of FDG uptake at right jugular bulb consistent with known tumor.   Also, hypodense lesion was noted in the right lobe of the liver without any significant FDG uptake.\par \par On 10/12/2016, he had audiology testing and was noted to have asymmetry in total discrimination with the right ear being worse.\par \par The patient denies vision changes, facial numbness or weakness, vision changes but does admit to around ~50% hearing loss on the right associate with his vertigo.  He admits to dysphagia with a foreign body sensation in his throat when swallowing liquids and solids. His vertigo prevents him from going about his usual activities. He is typically active and works out with a  twice/wk, but has slowed down since the symptoms began.   \par \par 6/27/17\par CT temporal bone w/ contrast on 3/15/17 showed overall stable glomus tumor, increased in size by 2mm. This was reviewed in tumor board and group consensus was 3 month follow up. CT 6/23/17 shows stable to slightly decreased size of the left jugular tumor. I reviewed his images in neuro-oncology tumor Board yesterday and the recommendation was for CT scan in 6 months.\par He is back to his normal activity. He recently had a battery change to his defibrillator. He has been following regularly with his cardiologist and other physicians. He is wondering if he can have meclizine for vertigo since his symptoms  continue to recur. He was recently in the emergency room for vertigo and his symptoms improved after receiving IV fluids.\par \par 12/4/17-Since his last visit he has continued to follow with Dr. Locke. He has seen Dr. 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 Reports episodic vertigo/dizziness for which he has been seeing Dr. Martin who started him on meclizine with good effect. \par CT brain 11/16/17 findings:   noted lesion measures approximately 2.2cm in craniocaudad dimension, decreased compared to temporal bone CT where it measured 2.6cm. No associated carotid stenosis or occlusion. Jugular vein patent. \par Mr. Vick feels generally well, c/o some fatigue, but is able to stay physically active and exercise throughout the week. Notes occasional vertigo and dizziness.  Appetite is normal, no sore throat or discomfort upon swallowing, but notes dry mouth still remains the same. \par

## 2020-09-22 NOTE — PHYSICAL EXAM
[Extraocular Movements] : extraocular movements were intact [Normal] : no joint swelling, no clubbing or cyanosis of the fingernails and muscle strength and tone were normal [No Focal Deficits] : no focal deficits [Oriented To Time, Place, And Person] : oriented to person, place, and time [Sclera] : the sclera and conjunctiva were normal [PERRL With Normal Accommodation] : pupils were equal in size, round, reactive to light [de-identified] : no abnormalities on external eye exam. [de-identified] : scarring of right TM; right hearing loss [de-identified] : Right hearing loss, CN II-XII otherwise grossly intact; strength 5/5 in B/L upper and lower extremities

## 2020-09-22 NOTE — REVIEW OF SYSTEMS
[Loss of Hearing] : loss of hearing [Negative] : Neurological [Nausea: Grade 0] : Nausea: Grade 0 [Fatigue: Grade 0] : Fatigue: Grade 0 [Tinnitus - Grade 0] : Tinnitus - Grade 0 [Blurred Vision: Grade 0] : Blurred Vision: Grade 0 [Xerostomia: Grade 0] : Xerostomia: Grade 0 [Swollen Glands] : no swollen glands [FreeTextEntry4] :  deaf right ear [de-identified] : vertigo

## 2020-09-30 ENCOUNTER — APPOINTMENT (OUTPATIENT)
Dept: NEUROLOGY | Facility: CLINIC | Age: 75
End: 2020-09-30
Payer: MEDICARE

## 2020-09-30 VITALS
OXYGEN SATURATION: 95 % | BODY MASS INDEX: 27.97 KG/M2 | WEIGHT: 174 LBS | SYSTOLIC BLOOD PRESSURE: 154 MMHG | HEIGHT: 66 IN | HEART RATE: 76 BPM | DIASTOLIC BLOOD PRESSURE: 76 MMHG | TEMPERATURE: 97.7 F

## 2020-09-30 DIAGNOSIS — R20.0 ANESTHESIA OF SKIN: ICD-10-CM

## 2020-09-30 PROCEDURE — 99204 OFFICE O/P NEW MOD 45 MIN: CPT

## 2020-09-30 RX ORDER — TOLTERODINE TARTRATE 4 MG/1
4 CAPSULE, EXTENDED RELEASE ORAL
Refills: 0 | Status: DISCONTINUED | COMMUNITY
End: 2020-09-30

## 2020-11-19 ENCOUNTER — APPOINTMENT (OUTPATIENT)
Dept: OPHTHALMOLOGY | Facility: CLINIC | Age: 75
End: 2020-11-19

## 2020-12-14 ENCOUNTER — APPOINTMENT (OUTPATIENT)
Dept: OTOLARYNGOLOGY | Facility: CLINIC | Age: 75
End: 2020-12-14

## 2020-12-16 PROBLEM — Z87.09 HISTORY OF ACUTE PHARYNGITIS: Status: RESOLVED | Noted: 2018-03-06 | Resolved: 2020-12-16

## 2021-01-11 ENCOUNTER — APPOINTMENT (OUTPATIENT)
Dept: HEART AND VASCULAR | Facility: CLINIC | Age: 76
End: 2021-01-11
Payer: MEDICARE

## 2021-01-11 VITALS — DIASTOLIC BLOOD PRESSURE: 67 MMHG | TEMPERATURE: 97.4 F | SYSTOLIC BLOOD PRESSURE: 152 MMHG | HEART RATE: 71 BPM

## 2021-01-11 PROCEDURE — 99213 OFFICE O/P EST LOW 20 MIN: CPT | Mod: 25

## 2021-01-11 PROCEDURE — 93280 PM DEVICE PROGR EVAL DUAL: CPT

## 2021-01-11 RX ORDER — ALBUTEROL SULFATE 90 UG/1
108 (90 BASE) INHALANT RESPIRATORY (INHALATION)
Qty: 7 | Refills: 0 | Status: COMPLETED | COMMUNITY
Start: 2020-12-28

## 2021-01-12 NOTE — HISTORY OF PRESENT ILLNESS
[de-identified] : Mr. Vick is a 75 year-old male with ADRIEL, HTN, BPH, ITP, macrocytosis, and chronic atrial fibrillation who underwent single chamber pacemaker implantation ([employer] [employer]) in 2007 for high grade AV block.  He underwent generator replacement in 2017.  Prior care with Dr. Levine.  He reports feeling well and denies chest pain, discomfort over the device site, palpitations, SOB, MARTIN, LE edema, orthopnea, PND, and syncope.  He reports a stable exercise tolerance and compliance with his medication regimen.  He is seeing Dr. Sidhu next week and will request an echocardiogram with her.\par \par Latest Echo with Dr. Sidhu on 10/2019 showed LVEF 58%, basal septal hypertrophy, LA enlargement. No significant valvular disease.

## 2021-01-12 NOTE — DISCUSSION/SUMMARY
[FreeTextEntry1] : Mr. Vick is a 74 year-old male with chronic atrial fibrillation s/p SJM single chamber pacemaker in the setting of complete heart block.  His device is functioning properly with a 98% ventricular pacing.  Latest Echo (10/2019) showed LVEF 58%. He will follow-up with Dr. Sidhu for general cardiology care and repeat ECHO. He remains in permanent atrial fibrillation and is on Warfarin for TE/CVA prophylaxis.  We discussed transitioning to a DOAC; he prefers to discuss with Dr. Sidhu.  Advised to return for device check in six months or sooner as needed.

## 2021-01-13 ENCOUNTER — APPOINTMENT (OUTPATIENT)
Dept: HEMATOLOGY ONCOLOGY | Facility: CLINIC | Age: 76
End: 2021-01-13

## 2021-01-14 ENCOUNTER — APPOINTMENT (OUTPATIENT)
Dept: OTOLARYNGOLOGY | Facility: CLINIC | Age: 76
End: 2021-01-14
Payer: MEDICARE

## 2021-01-14 VITALS
SYSTOLIC BLOOD PRESSURE: 155 MMHG | TEMPERATURE: 98 F | DIASTOLIC BLOOD PRESSURE: 81 MMHG | OXYGEN SATURATION: 96 % | HEART RATE: 70 BPM

## 2021-01-14 PROCEDURE — 99212 OFFICE O/P EST SF 10 MIN: CPT

## 2021-01-14 NOTE — ASSESSMENT
[FreeTextEntry1] : r cerumen impaction, could not tolerate removal\par debrox drops for a week\par rtc 1 week to remove - instructed how to use

## 2021-01-14 NOTE — PHYSICAL EXAM
[de-identified] : l normal; r copious hard cerumen - he could not tolerate even gentle removal [Normal] : no masses and lesions seen, face is symmetric

## 2021-01-14 NOTE — HISTORY OF PRESENT ILLNESS
[de-identified] : followup 76 yo man with h/o r otalgia for a week or two - it is intermittent and sharp. no inciting event. 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

## 2021-01-19 ENCOUNTER — APPOINTMENT (OUTPATIENT)
Dept: OPHTHALMOLOGY | Facility: CLINIC | Age: 76
End: 2021-01-19

## 2021-01-21 ENCOUNTER — APPOINTMENT (OUTPATIENT)
Dept: OTOLARYNGOLOGY | Facility: CLINIC | Age: 76
End: 2021-01-21

## 2021-02-03 ENCOUNTER — APPOINTMENT (OUTPATIENT)
Dept: HEMATOLOGY ONCOLOGY | Facility: CLINIC | Age: 76
End: 2021-02-03
Payer: MEDICARE

## 2021-02-03 VITALS
BODY MASS INDEX: 28.45 KG/M2 | TEMPERATURE: 97.9 F | WEIGHT: 177 LBS | OXYGEN SATURATION: 98 % | SYSTOLIC BLOOD PRESSURE: 138 MMHG | DIASTOLIC BLOOD PRESSURE: 80 MMHG | HEIGHT: 66 IN | HEART RATE: 69 BPM

## 2021-02-03 LAB
ERYTHROCYTE [SEDIMENTATION RATE] IN BLOOD BY WESTERGREN METHOD: 10 MM/HR
RBC # BLD: 4.15 M/UL
RETICS # AUTO: 1.4 %
RETICS AGGREG/RBC NFR: 59.8 K/UL

## 2021-02-03 PROCEDURE — 99214 OFFICE O/P EST MOD 30 MIN: CPT | Mod: 25

## 2021-02-03 PROCEDURE — 36415 COLL VENOUS BLD VENIPUNCTURE: CPT

## 2021-02-03 RX ORDER — OMEPRAZOLE 40 MG/1
40 CAPSULE, DELAYED RELEASE ORAL
Refills: 2 | Status: DISCONTINUED | COMMUNITY
Start: 2018-11-01 | End: 2021-02-03

## 2021-02-03 RX ORDER — BACILLUS COAGULANS/INULIN 1B-250 MG
CAPSULE ORAL
Refills: 0 | Status: DISCONTINUED | COMMUNITY
End: 2021-02-03

## 2021-02-03 RX ORDER — TOLTERODINE TARTRATE 4 MG/1
4 CAPSULE, EXTENDED RELEASE ORAL
Refills: 0 | Status: ACTIVE | COMMUNITY

## 2021-02-03 NOTE — HISTORY OF PRESENT ILLNESS
[de-identified] : Patient is a 75 year old male with a history of multiple medical problems who now presents for hematologic follow-up for immune thrombocytopenia, fatigue and macrocytosis. Patient's previous blood results ordered by Dr. Ed Marquez (neurologist) in November 2020 revealed that the platelets were unable to be reported due to significant platelet clumping, but that platelet estimate appeared normal. The MCV was 101.7. NORMA was positive 1:320. The hemoglobin, hematocrit, and white blood cell counts were all normal. The comprehensive metabolic panel was completely normal. In September 2020, the platelet count was 68,000, with occasional large platelets but without clumping reported. The NORMA was weakly positive at 1:80. The B12, folate, iron panel and ferritin were also normal at that time. Recent INR was therapeutic at 2.1. Patient remains on Warfarin for his atrial fibrillation. Since the last visit, patient had his pacemaker checked and states that all was normal. He complains of extreme fatigue, easy bruising, and recently diagnosed neuropathy causing "pins and needles" sensation in his feet. Denies excessive bleeding, recent infection, fever, chills, or sweats.

## 2021-02-03 NOTE — ASSESSMENT
[FreeTextEntry1] : Patient is a 75 year old male with a history multiple medical problems who now presents for hematologic follow-up for immune thrombocytopenia, fatigue and macrocytosis. Patient has positive NORMA. Patient recently had a CBC reporting platelet clumping. Will review peripheral smear. Chronic thrombocytopenia and macrocytosis may include a low grade myelodysplastic syndrome in the differential. Patient is not amenable to bone marrow studies at this time. .Ecchymoses due to treatment with Warfarin and thrombocytopenia. Have ordered flow cytometry, NORMA, B12 and folate, CBC, CMP, ferritin, iron panel, haptoglobin, reticulocyte count, and sedimentation rate. Patient was advised to call office to discuss results and next appointment date.

## 2021-02-03 NOTE — REASON FOR VISIT
[Follow-Up Visit] : a follow-up visit for [FreeTextEntry2] : immune thrombocytopenia, macrocytosis, fatigue, ecchymoses, easy bruising, positive NORMA

## 2021-02-03 NOTE — END OF VISIT
[FreeTextEntry3] : All medical record entries made by the Scribe were at my, Dr. Debby Mohamud, direction and personally dictated by me on 02/03/2021. I have reviewed the chart and agree that the record accurately reflects my personal performance of the history, physical exam, assessment and plan. I have also personally directed, reviewed, and agreed with the chart.

## 2021-02-03 NOTE — PHYSICAL EXAM
[Normal] : affect appropriate [de-identified] : pacemaker left anterior chest wall, RRR. S1, S2, murmur [de-identified] : fading ecchymosis on abdominal wall [de-identified] : fading ecchymoses right side of abdomen

## 2021-02-03 NOTE — REVIEW OF SYSTEMS
[Fatigue] : fatigue [Easy Bruising] : a tendency for easy bruising [Negative] : Allergic/Immunologic [Easy Bleeding] : no tendency for easy bleeding [Swollen Glands] : no swollen glands [FreeTextEntry2] : "complete fatigue" [de-identified] : neuropathy involving the lower extremities, pins and needles feeling in feet

## 2021-02-04 LAB
ALBUMIN SERPL ELPH-MCNC: 4.9 G/DL
ALP BLD-CCNC: 78 U/L
ALT SERPL-CCNC: 29 U/L
ANION GAP SERPL CALC-SCNC: 13 MMOL/L
ANISOCYTOSIS BLD QL: SLIGHT
AST SERPL-CCNC: 23 U/L
BASOPHILS # BLD AUTO: 0.21 K/UL
BASOPHILS # BLD AUTO: 0.21 K/UL
BASOPHILS NFR BLD AUTO: 3.5 %
BASOPHILS NFR BLD AUTO: 3.5 %
BILIRUB SERPL-MCNC: 0.9 MG/DL
BUN SERPL-MCNC: 13 MG/DL
CALCIUM SERPL-MCNC: 9.7 MG/DL
CHLORIDE SERPL-SCNC: 102 MMOL/L
CO2 SERPL-SCNC: 26 MMOL/L
CREAT SERPL-MCNC: 0.88 MG/DL
DACRYOCYTES BLD QL SMEAR: SLIGHT
EOSINOPHIL # BLD AUTO: 0 K/UL
EOSINOPHIL # BLD AUTO: 0 K/UL
EOSINOPHIL NFR BLD AUTO: 0 %
EOSINOPHIL NFR BLD AUTO: 0 %
FERRITIN SERPL-MCNC: 71 NG/ML
FOLATE SERPL-MCNC: 11.8 NG/ML
GIANT PLATELETS BLD QL SMEAR: PRESENT
GLUCOSE SERPL-MCNC: 88 MG/DL
HAPTOGLOB SERPL-MCNC: 62 MG/DL
HCT VFR BLD CALC: 43.3 %
HGB BLD-MCNC: 13.8 G/DL
IRON SATN MFR SERPL: 40 %
IRON SERPL-MCNC: 112 UG/DL
LDH SERPL-CCNC: 212 U/L
LYMPHOCYTES # BLD AUTO: 1.36 K/UL
LYMPHOCYTES # BLD AUTO: 1.36 K/UL
LYMPHOCYTES NFR BLD AUTO: 22.8 %
LYMPHOCYTES NFR BLD AUTO: 22.8 %
MACROCYTES BLD QL SMEAR: SLIGHT
MAN DIFF?: NORMAL
MCHC RBC-ENTMCNC: 31.9 GM/DL
MCHC RBC-ENTMCNC: 33.3 PG
MCV RBC AUTO: 104.3 FL
MONOCYTES # BLD AUTO: 0.47 K/UL
MONOCYTES # BLD AUTO: 0.47 K/UL
MONOCYTES NFR BLD AUTO: 7.9 %
MONOCYTES NFR BLD AUTO: 7.9 %
MSMEAR: NORMAL
NEUTROPHILS # BLD AUTO: 3.92 K/UL
NEUTROPHILS # BLD AUTO: 3.92 K/UL
NEUTROPHILS NFR BLD AUTO: 62.3 %
NEUTROPHILS NFR BLD AUTO: 62.3 %
NEUTS BAND NFR BLD MANUAL: 3.5 %
OVALOCYTES BLD QL SMEAR: SLIGHT
PLAT MORPH BLD: ABNORMAL
PLATELET # BLD AUTO: 71 K/UL
POIKILOCYTOSIS BLD QL SMEAR: SLIGHT
POLYCHROMASIA BLD QL SMEAR: SLIGHT
POTASSIUM SERPL-SCNC: 4.4 MMOL/L
PROT SERPL-MCNC: 7.5 G/DL
RBC # BLD: 4.15 M/UL
RBC # FLD: 15 %
RBC BLD AUTO: ABNORMAL
SODIUM SERPL-SCNC: 141 MMOL/L
TIBC SERPL-MCNC: 282 UG/DL
UIBC SERPL-MCNC: 170 UG/DL
VIT B12 SERPL-MCNC: 858 PG/ML
WBC # FLD AUTO: 5.96 K/UL

## 2021-02-08 LAB
ANA PAT FLD IF-IMP: ABNORMAL
ANA PATTERN: ABNORMAL
ANA SER IF-ACNC: ABNORMAL
ANA TITER: ABNORMAL

## 2021-02-10 ENCOUNTER — NON-APPOINTMENT (OUTPATIENT)
Age: 76
End: 2021-02-10

## 2021-03-12 ENCOUNTER — APPOINTMENT (OUTPATIENT)
Dept: OPHTHALMOLOGY | Facility: CLINIC | Age: 76
End: 2021-03-12

## 2021-03-31 ENCOUNTER — NON-APPOINTMENT (OUTPATIENT)
Age: 76
End: 2021-03-31

## 2021-03-31 ENCOUNTER — APPOINTMENT (OUTPATIENT)
Dept: OPHTHALMOLOGY | Facility: CLINIC | Age: 76
End: 2021-03-31
Payer: MEDICARE

## 2021-03-31 PROCEDURE — 92014 COMPRE OPH EXAM EST PT 1/>: CPT

## 2021-03-31 PROCEDURE — 92083 EXTENDED VISUAL FIELD XM: CPT

## 2021-03-31 PROCEDURE — 92133 CPTRZD OPH DX IMG PST SGM ON: CPT

## 2021-06-14 ENCOUNTER — APPOINTMENT (OUTPATIENT)
Dept: CT IMAGING | Facility: CLINIC | Age: 76
End: 2021-06-14

## 2021-06-17 ENCOUNTER — APPOINTMENT (OUTPATIENT)
Dept: RADIATION ONCOLOGY | Facility: CLINIC | Age: 76
End: 2021-06-17
Payer: MEDICARE

## 2021-06-21 ENCOUNTER — APPOINTMENT (OUTPATIENT)
Dept: CT IMAGING | Facility: CLINIC | Age: 76
End: 2021-06-21
Payer: MEDICARE

## 2021-06-21 ENCOUNTER — OUTPATIENT (OUTPATIENT)
Dept: OUTPATIENT SERVICES | Facility: HOSPITAL | Age: 76
LOS: 1 days | End: 2021-06-21

## 2021-06-21 ENCOUNTER — RESULT REVIEW (OUTPATIENT)
Age: 76
End: 2021-06-21

## 2021-06-21 DIAGNOSIS — Z98.890 OTHER SPECIFIED POSTPROCEDURAL STATES: Chronic | ICD-10-CM

## 2021-06-21 DIAGNOSIS — Z95.0 PRESENCE OF CARDIAC PACEMAKER: Chronic | ICD-10-CM

## 2021-06-21 PROCEDURE — 70487 CT MAXILLOFACIAL W/DYE: CPT | Mod: 26,MH

## 2021-06-24 ENCOUNTER — APPOINTMENT (OUTPATIENT)
Dept: RADIATION ONCOLOGY | Facility: CLINIC | Age: 76
End: 2021-06-24
Payer: MEDICARE

## 2021-06-24 VITALS
BODY MASS INDEX: 27.28 KG/M2 | HEART RATE: 60 BPM | RESPIRATION RATE: 16 BRPM | WEIGHT: 169 LBS | DIASTOLIC BLOOD PRESSURE: 54 MMHG | SYSTOLIC BLOOD PRESSURE: 119 MMHG | TEMPERATURE: 97.9 F | OXYGEN SATURATION: 99 %

## 2021-06-24 PROCEDURE — 99214 OFFICE O/P EST MOD 30 MIN: CPT

## 2021-06-24 NOTE — HISTORY OF PRESENT ILLNESS
[FreeTextEntry1] : Mr. 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 \par \par 6/24/21- Follow up\par Mr. Dawson returns for routine follow up. He was last seen on 9/17/2020. Plan was for CT Maxillofacial to assess right glomus jugulare tumor and orbital lesions. He reports he was just seen by Dr. Vazquez who removed excess cerumen from his right ear. Patient reports that on some days of the weak, he feels that he crosses his legs as he walks. Sometimes he needs assistance from him wife when it happens. He has gone to vesitbular therapy in the past and won't go back, says it was " a waste of time." Otherwise, denies headaches, dizziness, vertigo. Denies changes in vision or hearing from baseline; Denies change in taste of smell. Endorses that he had a recent sinus infection for which he receive antibiotics from Dr. Vazquez, which has resolved. Denies all other ROS. \par \par Patient had a CT Maxillofacial on 6/21/21. \par CT Maxillofacial 6/21/21: Stable right glomus jugulare tumor. Follow up of orbital lesions not recommended.\par \par Since his last follow up, he has maintained follow up with ENT Dr. Vazquez (last seen today 6/24/21) , Dr. Martin (last seen Jan 2021) and Heme Dr. Mohamud (last seen Feb 2021). \par \par ______________\par 9/17/20 - Follow-up\par Mr. Vick returns for routine follow-up.  He was last seen on 12/12/19.  Plan was for CT maxillofacial and f/u in 9 months, continue f/u with Dr. Martin, encouraged vestibular therapy, continue f/u with cardiology and hematology.  \par \par CT maxillofacial 9/15/20\par - Stable appearance of right glomus jugulare. \par - Variable size of enhancing lesion of the left orbit supports vasoformative etiology; no significant interval change is identified in bilateral orbital lesions, as above. \par \par He last saw Dr. Martin on 6/15/20.  Plan is for f/u in December.  He states he had vestibular therapy and removal of cerumen.  He states room spinning has resolved.  He continues to have intermittent dizziness.  He recently switched finasteride to tolterodine and discontinued Cialis with some improvement.  He last saw cardiology Dr. Sosa on 6/23.  He last saw hematology Dr. Mohamud on 9/14/20.  Today, he reports feeling well overall and is without complaints. \par \par 12/12/19 - Follow-up\par Mr. Vick returns for routine follow-up.  He was last seen on 5/29/19.  Plan was for DOTATATE PET/CT, f/u with Dr. Sosa to discuss if MRI can be done with pacemaker, f/u with Dr. Martin regarding vestibular therapy.  We confirmed that his pacemaker is NOT MRI compatible.  \par \par DOTATATE PET/CT 6/6/19 - Impression: There is again marked DOTATATE activity in the known right glomus jugulare tumor. No abnormal dilated activity is identified in the orbits to suggest metastatic spread of disease. There is trace DOTATATE activity in the left mastoid region and region that is becoming more sclerotic, probably benign. \par \par CT maxillofacial with contrast 6/10/19 - IMPRESSION: Noncontrast CT shows symmetric soft tissue density of recently discovered orbital masses. The one on the left showed avid enhancement on recent temporal bone CT whereas the right lesion did not. 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 \par Given stability of right glomus tumor, surveillance imaging of both the primary tumor and these orbital lesions may be accomplished with contrast-enhanced CT of the face. \par \par CT maxillofacial with contrast 12/5/19 - IMPRESSION: Stable appearance to bilateral orbital soft tissue nodules, as above. \par \par He last saw Dr. Laguna on 7/15/19.  Plan was for PRN follow-up.  Today, he reports that approximately 2-3 weeks ago his vertigo symptoms change from a feeling of dizziness to room spinning.  He states this caused him to fall once.  He last saw Dr. Martin yesterday and was referred for vestibular rehab.  He otherwise feels well and is without complaints.  He denies fevers, chills, headaches, dyspnea, chest pain, palpitations.  \par \par 5/29/19- FOLLOW UP\par Mr Vick returns today for routine follow up. She was last seen here on 12/18/18. The plan at that time was to return in 6 months for follow up and CT temporal bone w/ contrast, continue biotene, follow up with Cade Gregg, RUSTY and Elda. Encouraged to increase activity. \par \par CT temporal bones 5/23/19 - Continued stability in size and extent of right glomus jugulare tumor compared to May 2018.  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 These show indolent growth and behavior without invasion of the bony orbit and deviation, not invasion, of adjacent muscles. 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 Differential diagnosis may include primary orbital meningioma or possible inflammatory/granulomatous process although these are felt less likely and rarer possibilities. \par \par He last saw Dr. Mohamud for f/u of immune thrombocytopenia and macrocytosis on 4/30/19, platelet count was low but stable at 75,000, H/H and WBC were normal, .2, chemistries unremarkable, rheumatoid factor normal, NORMA +1 320 homogenous.  He is seeing Rheumatologist, Dr. Eloise Mcdonald, and states he was ruled out for rheumatoid arthritis.  He last saw Dr. Martin on 11/9/2018; he was recommended for vestibular rehab, which he has not done.  He is requesting a referral to a vestibular therapist near his home.  He last saw Dr. La (vascular) on 10/10/18.  He last saw PMD, Dr. Chowdary a few months ago.  He is planning to see a new electrophysiologist, Dr. Sosa, for pacemaker management.  Today, he reports feeling well.  He states that his vertigo symptoms are unchanged.  He denies fevers, chills, headaches, visual and hearing disturbances, unilateral weakness, dyspnea, chest pain, palpitations, nausea, vomiting, constipation and diarrhea.  \par \par 12/18/18-Follow up\par At his last visit he was recovering well but continued to complain of fatigue and vertigo. He followed up with Dr. Martin on 11/9/18 for vertigo, and was advised to have vestibular therapy, which the patient declined.  He consulted with Dr. 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 He states that vertigo is better and notes that he declined vestibular therapy. Denies headaches or visual changes. Recently saw opthalmologist and was told eye exam normal. His wife continues to deal with complications of a stroke and now has Graves disease.\par \par \par 11/1/18-Follow up\par At his last visit he complained of vertigo and requested to try meclizine after discussion with his cardiologist. He also had complaints of dry mouth and was using biotene. He was advised to follow up with Dr. Martin.\par \par Notably, he followed up with Derek Martin and Chanel. He consulted with Dr. Sy (vascular) for leg pain and does not require intervention at this time\par \par Today he states that he has felt "total exhaustion" and has had vertigo since August. He has been seen by hematologist Dr. Mohamud for cytopenia of some sort, he does not know what.  He  states that he tried meclizine for 3 days which did not helped. He notes dry mouth for which water helps and occasional headaches. He has followed up with his dentist and PMD. He states that he has an appt. to see Dr. Martin 12/13/2018. He has been working out the last 4 days and feels perkier since then.\par \par \par 6/4/18- Today Mr. Vick presents today for follow up. Since he saw us last in December 2017 he has seen Dr. Martin twice since he saw us- once for cerumen removal and once for concern about dysphagia. \par CT IAC done on 5/31/18 shows decrease in size of right glomus jugulare tumor, previously max dimension 2.7cm, now 2.5cm. \par \par Today he notes dryness of mouth, not using mouth rinses. Using prevident toothpaste but not doing head, mouth and neck exercises. Appetite good. Denies dysphagia. His main concerns are vertigo and dryness. He thinks he has used meclizine in the past but cannot recall.\par \par HISTORY: \par Mr. Vick is a 70 yo gentleman who recently received a radiologic diagnosis of right glomus jugulare tumor. He has had vertigo for about 9 weeks. He always feels off balance when standing or walking and often feels like the room is spinning. He reports this sensation is at times 10/10 in severity. Walking makes it worse.  However, he noticed after a brief course of steroids early in his diagnosis, a significant decrease in dizziness and improvement in his ability to walk.\par \par He was referred to Dr. Hastings (ENT) by his PCP eventually and imaging with a CT head on 10/6/16 identified a lesion of the right jugular foramen/ jugular bulb, thought to be consistent with a right-sided glomus tumor. There was an expansile permeative lytic lesion centered upon the right jugular foramen with extension medially into the cerebellopontine angle and slightly inferiorly into the upper neck soft tissues measuring approximately 2.8 x 2 x 2.7 cm.  There was partial erosion of the floor of the internal auditory canal and erosion of the carotid canal.  Additionally, there appeared to be erosion overlying the vestibule and posterior semicircular canal.\par \par Of note, in 2015 he had a CT head done at [facility] for headaches and on my review, it appears that the mass may have been present on the scan as well. \par \par PET/CT here at [facility] on 10/11/16 showed area of FDG uptake at right jugular bulb consistent with known tumor.   Also, hypodense lesion was noted in the right lobe of the liver without any significant FDG uptake.\par \par On 10/12/2016, he had audiology testing and was noted to have asymmetry in total discrimination with the right ear being worse.\par \par The patient denies vision changes, facial numbness or weakness, vision changes but does admit to around ~50% hearing loss on the right associate with his vertigo.  He admits to dysphagia with a foreign body sensation in his throat when swallowing liquids and solids. His vertigo prevents him from going about his usual activities. He is typically active and works out with a  twice/wk, but has slowed down since the symptoms began.   \par \par 6/27/17\par CT temporal bone w/ contrast on 3/15/17 showed overall stable glomus tumor, increased in size by 2mm. This was reviewed in tumor board and group consensus was 3 month follow up. CT 6/23/17 shows stable to slightly decreased size of the left jugular tumor. I reviewed his images in neuro-oncology tumor Board yesterday and the recommendation was for CT scan in 6 months.\par He is back to his normal activity. He recently had a battery change to his defibrillator. He has been following regularly with his cardiologist and other physicians. He is wondering if he can have meclizine for vertigo since his symptoms  continue to recur. He was recently in the emergency room for vertigo and his symptoms improved after receiving IV fluids.\par \par 12/4/17-Since his last visit he has continued to follow with Dr. Locke. He has seen Dr. 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 Reports episodic vertigo/dizziness for which he has been seeing Dr. Martin who started him on meclizine with good effect. \par CT brain 11/16/17 findings:   noted lesion measures approximately 2.2cm in craniocaudad dimension, decreased compared to temporal bone CT where it measured 2.6cm. No associated carotid stenosis or occlusion. Jugular vein patent. \par Mr. Vick feels generally well, c/o some fatigue, but is able to stay physically active and exercise throughout the week. Notes occasional vertigo and dizziness.  Appetite is normal, no sore throat or discomfort upon swallowing, but notes dry mouth still remains the same. \par

## 2021-06-24 NOTE — PHYSICAL EXAM
[Bowel Sounds] : normal bowel sounds [Abdomen Soft] : soft [Nondistended] : nondistended [Abdomen Tenderness] : non-tender [Cervical Lymph Nodes Enlarged Posterior Bilaterally] : posterior cervical [Cervical Lymph Nodes Enlarged Anterior Bilaterally] : anterior cervical [Supraclavicular Lymph Nodes Enlarged Bilaterally] : supraclavicular [Axillary Lymph Nodes Enlarged Bilaterally] : axillary [Normal] : normal spine exam without palpable tenderness, no kyphosis or scoliosis [Musculoskeletal - Swelling] : no joint swelling [Nail Clubbing] : no clubbing  or cyanosis of the fingernails [Range of Motion to Joints] : range of motion to joints [Motor Tone] : muscle strength and tone were normal [de-identified] : scarring of right TM with dark treated tumor seen behind TM; right hearing loss;  External auricular area clean dry and intact [de-identified] : Right hearing loss, CN II-XII otherwise grossly intact; strength 5/5 in B/L upper and lower extremities [de-identified] : Anxious about diagnosis, treatment and plan;

## 2021-06-24 NOTE — REVIEW OF SYSTEMS
[Anxiety] : anxiety [Negative] : Heme/Lymph [Swollen Glands] : no swollen glands [FreeTextEntry4] :  deaf right ear; s/p cerumen removal in right ear today 6/24/21 [de-identified] : vertigo

## 2021-07-12 ENCOUNTER — APPOINTMENT (OUTPATIENT)
Dept: HEART AND VASCULAR | Facility: CLINIC | Age: 76
End: 2021-07-12
Payer: MEDICARE

## 2021-07-12 VITALS
HEIGHT: 66 IN | HEART RATE: 65 BPM | TEMPERATURE: 97.2 F | SYSTOLIC BLOOD PRESSURE: 135 MMHG | DIASTOLIC BLOOD PRESSURE: 65 MMHG | WEIGHT: 174 LBS | BODY MASS INDEX: 27.97 KG/M2

## 2021-07-12 DIAGNOSIS — Z79.01 LONG TERM (CURRENT) USE OF ANTICOAGULANTS: ICD-10-CM

## 2021-07-12 DIAGNOSIS — Z45.018 ENCOUNTER FOR ADJUSTMENT AND MANAGEMENT OF OTHER PART OF CARDIAC PACEMAKER: ICD-10-CM

## 2021-07-12 DIAGNOSIS — I48.21 PERMANENT ATRIAL FIBRILLATION: ICD-10-CM

## 2021-07-12 PROCEDURE — 93279 PRGRMG DEV EVAL PM/LDLS PM: CPT

## 2021-07-12 PROCEDURE — 99212 OFFICE O/P EST SF 10 MIN: CPT | Mod: 25

## 2021-07-12 NOTE — PHYSICAL EXAM
[General Appearance - Well Developed] : well developed [Normal Appearance] : normal appearance [Well Groomed] : well groomed [General Appearance - Well Nourished] : well nourished [No Deformities] : no deformities [General Appearance - In No Acute Distress] : no acute distress [Clean] : clean [Dry] : dry [Well-Healed] : well-healed

## 2021-07-12 NOTE — HISTORY OF PRESENT ILLNESS
[de-identified] : Mr. Vick is a 75 year-old male with ADRIEL, HTN, BPH, ITP, macrocytosis, and chronic atrial fibrillation who underwent single chamber pacemaker implantation ([employer]) in 2007 for high grade AV block.  He underwent generator replacement in 2017.  Prior care with Dr. Levine.  He reports feeling well and denies chest pain, discomfort over the device site, palpitations, SOB, MARTIN, LE edema, orthopnea, PND, and syncope.  He reports a stable exercise tolerance and compliance with his medication regimen.\par \par He's been following Dr. Sidhu who repeated an echo a few months ago (we will call her for the records). \par \par Latest Echo with Dr. Sidhu on 10/2019 showed LVEF 58%, basal septal hypertrophy, LA enlargement. No significant valvular disease.

## 2021-07-12 NOTE — PROCEDURE
[Atrial Fibrillation] : atrial fibrillation [Pacemaker] : pacemaker [VVIR] : VVIR [Longevity: ___ months] : The estimated remaining battery life is [unfilled] months [Lead Imp:  ___ohms] : lead impedance was [unfilled] ohms [Sensing Amplitude ___mv] : sensing amplitude was [unfilled] mv [___V @] : [unfilled] V [___ ms] : [unfilled] ms [None] : none [de-identified] : VR ~ 40 bpm [de-identified] : [employer] [de-identified] : Assurity [id number] [de-identified] : [id number] [de-identified] : 4/27/17 [de-identified] : 59 [de-identified] : 9-10 years [de-identified] :  98%\par Brief NSVT 2s - April 6th

## 2021-07-28 ENCOUNTER — APPOINTMENT (OUTPATIENT)
Dept: OPHTHALMOLOGY | Facility: CLINIC | Age: 76
End: 2021-07-28
Payer: MEDICARE

## 2021-07-28 ENCOUNTER — NON-APPOINTMENT (OUTPATIENT)
Age: 76
End: 2021-07-28

## 2021-07-28 PROCEDURE — 92250 FUNDUS PHOTOGRAPHY W/I&R: CPT

## 2021-07-28 PROCEDURE — 92014 COMPRE OPH EXAM EST PT 1/>: CPT

## 2021-08-02 ENCOUNTER — APPOINTMENT (OUTPATIENT)
Dept: HEMATOLOGY ONCOLOGY | Facility: CLINIC | Age: 76
End: 2021-08-02
Payer: MEDICARE

## 2021-08-02 VITALS
SYSTOLIC BLOOD PRESSURE: 122 MMHG | HEIGHT: 66 IN | OXYGEN SATURATION: 97 % | DIASTOLIC BLOOD PRESSURE: 76 MMHG | HEART RATE: 80 BPM | BODY MASS INDEX: 27.97 KG/M2 | TEMPERATURE: 97.5 F | WEIGHT: 174 LBS

## 2021-08-02 LAB
RBC # BLD: 4.11 M/UL
RETICS # AUTO: 1.5 %
RETICS AGGREG/RBC NFR: 62.9 K/UL

## 2021-08-02 PROCEDURE — 99214 OFFICE O/P EST MOD 30 MIN: CPT

## 2021-08-02 RX ORDER — RAMIPRIL 5 MG/1
5 CAPSULE ORAL
Qty: 90 | Refills: 0 | Status: ACTIVE | COMMUNITY
Start: 2021-06-24

## 2021-08-02 RX ORDER — RAMIPRIL 2.5 MG/1
2.5 CAPSULE ORAL
Qty: 30 | Refills: 0 | Status: ACTIVE | COMMUNITY
Start: 2021-04-06

## 2021-08-02 RX ORDER — CIPROFLOXACIN HYDROCHLORIDE 500 MG/1
500 TABLET, FILM COATED ORAL
Qty: 20 | Refills: 0 | Status: DISCONTINUED | COMMUNITY
Start: 2021-06-10

## 2021-08-02 NOTE — REASON FOR VISIT
[Follow-Up Visit] : a follow-up visit for [FreeTextEntry2] : Thrombocytopenia, macrocytosis, easy bruising, positive NORMA, T- Cell large granular lymphocytes

## 2021-08-02 NOTE — HISTORY OF PRESENT ILLNESS
[de-identified] : Patient is a 75 year old male with a history of multiple medical problems including cardiac arrhythmia, neuropathy, IBS, vertigo, reflux who now presents for hematologic follow-up for immune thrombocytopenia, fatigue and macrocytosis. Blood results from patient's last visit in February 2021 revealed the white blood count was 5.96, hemoglobin was 13.8, hematocrit 43.3, MCV was 105.3, and platelet count was low but relatively stable at 71,000. Flow cytometry revealed no evidence for abnormal myeloid maturation, increased blasts or a B-cell lymphoproliferative disorder. There was a slight increase in T- large granular lymphocytes and positive T-cell gene rearrangement which may be reactive in nature or a clonal hematologic abnormality. The comprehensive metabolic panel was normal, sedimentation rate was 10, iron panel and ferritin were normal. B12 and folate were also normal.  NORMA has been weakly positive in the past. Patient remains on Warfarin for his atrial fibrillation. He complains of easy bruising, neuropathy in both feet, and persistent fatigue. Denies bleeding from nose or mouth, hematuria, hematochezia, unintentional weight loss, recent infection, fever, chills, or sweats.

## 2021-08-02 NOTE — ASSESSMENT
[FreeTextEntry1] : Patient is a 75 year old male with a history of multiple medical problems who now presents for hematologic follow-up for immune thrombocytopenia, fatigue, macrocytosis and large granular lymphocytosis. Patient's ecchymoses are likely traumatic and  are due to treatment with Warfarin and his thrombocytopenia.. Patient has had a positive NORMA in the past, but chronic thrombocytopenia and macrocytosis still include a low grade myelodysplastic syndrome in the differential. Have again  discussed the possibility of bone marrow studies in the near future, which patient may consider if done with sedation..  Have ordered NORMA, APTT, B12 and folate, B2MG, CBC, CMP, CRP, ferritin, flow cytometry, haptoglobin, iron panel, LDH, PT/INR, reticulocyte count, sed rate, uric acid. Patient was advised to call office to discuss results and next appointment date.\par

## 2021-08-02 NOTE — PHYSICAL EXAM
[Normal] : affect appropriate [de-identified] :  fading ecchymosis on chest wall [de-identified] : pacemaker left anterior chest wall, RRR. S1, S2, murmur, ectopy,  [de-identified] : fading ecchymosis on abdominal wall [de-identified] : fading ecchymoses right side of abdomen and chest

## 2021-08-02 NOTE — REVIEW OF SYSTEMS
[Fatigue] : fatigue [Recent Change In Weight] : ~T recent weight change [Easy Bruising] : a tendency for easy bruising [Negative] : Allergic/Immunologic [Fever] : no fever [Chills] : no chills [Night Sweats] : no night sweats [Easy Bleeding] : no tendency for easy bleeding [Swollen Glands] : no swollen glands [FreeTextEntry2] : gained a few pounds during pandemic [de-identified] : peripheral neuropathy affecting both feet

## 2021-08-02 NOTE — END OF VISIT
[FreeTextEntry3] : All medical record entries made by the Scribe were at my, Dr. Debby Mohamud, direction and personally dictated by me on 08/02/2021. I have reviewed the chart and agree that the record accurately reflects my personal performance of the history, physical exam, assessment and plan. I have also personally directed, reviewed, and agreed with the chart.

## 2021-08-03 LAB
ALBUMIN SERPL ELPH-MCNC: 4.8 G/DL
ALP BLD-CCNC: 88 U/L
ALT SERPL-CCNC: 26 U/L
ANION GAP SERPL CALC-SCNC: 11 MMOL/L
ANISOCYTOSIS BLD QL: SLIGHT
APTT BLD: 46 SEC
AST SERPL-CCNC: 18 U/L
B2 MICROGLOB SERPL-MCNC: 2.2 MG/L
BASOPHILS # BLD AUTO: 0.13 K/UL
BASOPHILS # BLD AUTO: 0.13 K/UL
BASOPHILS NFR BLD AUTO: 1.7 %
BASOPHILS NFR BLD AUTO: 1.7 %
BILIRUB SERPL-MCNC: 0.8 MG/DL
BUN SERPL-MCNC: 14 MG/DL
CALCIUM SERPL-MCNC: 10 MG/DL
CHLORIDE SERPL-SCNC: 102 MMOL/L
CO2 SERPL-SCNC: 26 MMOL/L
CREAT SERPL-MCNC: 0.88 MG/DL
CRP SERPL-MCNC: <3 MG/L
EOSINOPHIL # BLD AUTO: 0 K/UL
EOSINOPHIL # BLD AUTO: 0 K/UL
EOSINOPHIL NFR BLD AUTO: 0 %
EOSINOPHIL NFR BLD AUTO: 0 %
ERYTHROCYTE [SEDIMENTATION RATE] IN BLOOD BY WESTERGREN METHOD: 10 MM/HR
FERRITIN SERPL-MCNC: 117 NG/ML
GLUCOSE SERPL-MCNC: 83 MG/DL
HAPTOGLOB SERPL-MCNC: 63 MG/DL
HCT VFR BLD CALC: 43.6 %
HGB BLD-MCNC: 14.2 G/DL
INR PPP: 2.38
IRON SATN MFR SERPL: 41 %
IRON SERPL-MCNC: 119 UG/DL
LDH SERPL-CCNC: 221 U/L
LYMPHOCYTES # BLD AUTO: 1.72 K/UL
LYMPHOCYTES # BLD AUTO: 1.72 K/UL
LYMPHOCYTES NFR BLD AUTO: 22.6 %
LYMPHOCYTES NFR BLD AUTO: 22.6 %
MACROCYTES BLD QL SMEAR: SLIGHT
MAN DIFF?: NORMAL
MCHC RBC-ENTMCNC: 32.6 GM/DL
MCHC RBC-ENTMCNC: 34.5 PG
MCV RBC AUTO: 106.1 FL
MONOCYTES # BLD AUTO: 0.27 K/UL
MONOCYTES # BLD AUTO: 0.27 K/UL
MONOCYTES NFR BLD AUTO: 3.5 %
MONOCYTES NFR BLD AUTO: 3.5 %
MSMEAR: NORMAL
NEUTROPHILS # BLD AUTO: 5.5 K/UL
NEUTROPHILS # BLD AUTO: 5.5 K/UL
NEUTROPHILS NFR BLD AUTO: 71.3 %
NEUTROPHILS NFR BLD AUTO: 71.3 %
NEUTS BAND NFR BLD MANUAL: 0.9 %
NRBC # BLD MANUAL: 1 /100
PLAT MORPH BLD: ABNORMAL
PLATELET # BLD AUTO: 70 K/UL
POIKILOCYTOSIS BLD QL SMEAR: SLIGHT
POLYCHROMASIA BLD QL SMEAR: SLIGHT
POTASSIUM SERPL-SCNC: 5 MMOL/L
PROT SERPL-MCNC: 7.3 G/DL
PT BLD: 27.4 SEC
RBC # BLD: 4.11 M/UL
RBC # FLD: 15 %
RBC BLD AUTO: ABNORMAL
SCHISTOCYTES BLD QL SMEAR: SLIGHT
SODIUM SERPL-SCNC: 140 MMOL/L
TIBC SERPL-MCNC: 289 UG/DL
UIBC SERPL-MCNC: 171 UG/DL
URATE SERPL-MCNC: 5.4 MG/DL
WBC # FLD AUTO: 7.62 K/UL

## 2021-08-04 LAB
ANA SER IF-ACNC: NEGATIVE
FOLATE SERPL-MCNC: 9.2 NG/ML
VIT B12 SERPL-MCNC: 843 PG/ML

## 2021-08-06 ENCOUNTER — NON-APPOINTMENT (OUTPATIENT)
Age: 76
End: 2021-08-06

## 2021-08-18 ENCOUNTER — APPOINTMENT (OUTPATIENT)
Dept: OTOLARYNGOLOGY | Facility: CLINIC | Age: 76
End: 2021-08-18

## 2021-10-11 ENCOUNTER — APPOINTMENT (OUTPATIENT)
Dept: HEART AND VASCULAR | Facility: CLINIC | Age: 76
End: 2021-10-11
Payer: MEDICARE

## 2021-10-11 VITALS
SYSTOLIC BLOOD PRESSURE: 116 MMHG | DIASTOLIC BLOOD PRESSURE: 68 MMHG | HEART RATE: 67 BPM | BODY MASS INDEX: 27.32 KG/M2 | HEIGHT: 66 IN | WEIGHT: 170 LBS | TEMPERATURE: 97.9 F

## 2021-10-11 PROCEDURE — 93280 PM DEVICE PROGR EVAL DUAL: CPT

## 2021-10-11 PROCEDURE — 99213 OFFICE O/P EST LOW 20 MIN: CPT | Mod: 25

## 2021-10-12 NOTE — PROCEDURE
[Atrial Fibrillation] : atrial fibrillation [Pacemaker] : pacemaker [VVIR] : VVIR [Longevity: ___ months] : The estimated remaining battery life is [unfilled] months [Lead Imp:  ___ohms] : lead impedance was [unfilled] ohms [Sensing Amplitude ___mv] : sensing amplitude was [unfilled] mv [___V @] : [unfilled] V [___ ms] : [unfilled] ms [None] : none [No] : not [de-identified] : [employer] [de-identified] : VR ~ 45 bpm [de-identified] : Assurity [id number] [de-identified] : [id number] [de-identified] : 59 [de-identified] : 4/27/17 [de-identified] : 9.8-10.6 years [de-identified] :  98%\par

## 2021-10-12 NOTE — HISTORY OF PRESENT ILLNESS
[de-identified] : Mr. Vick is a 76 year-old male with ADRIEL, HTN, BPH, ITP, macrocytosis, and chronic atrial fibrillation who underwent single chamber pacemaker implantation ([employer]) in 2007 for high grade AV block.  He underwent generator replacement in 2017.  Prior care with Dr. Levine.  He reports feeling well and denies chest pain, discomfort over the device site, palpitations, SOB, MARTIN, LE edema, orthopnea, PND, and syncope.  He reports a stable exercise tolerance and compliance with his medication regimen.  He has discussed transitioning to a NOAC with Dr. Sidhu and the plan is to continue Warfarin at this point.\par \par Reports a recent echo with Dr. Sidhu.\par \par Latest Echo with Dr. Sidhu on 10/2019 showed LVEF 58%, basal septal hypertrophy, LA enlargement. No significant valvular disease.

## 2021-10-12 NOTE — DISCUSSION/SUMMARY
[FreeTextEntry1] : Mr. Vick is a 76 year-old male with permanent atrial fibrillation s/p SJM single chamber pacemaker in the setting of complete heart block.  His device is functioning properly with a 98% ventricular pacing.  Latest Echo (10/2019) showed LVEF 58%. He will follow-up with Dr. Sidhu for general cardiology care and repeat ECHO. He remains in permanent atrial fibrillation and is on Warfarin for TE/CVA prophylaxis.  Advised to return for device check in six months or sooner as needed.

## 2021-11-01 ENCOUNTER — APPOINTMENT (OUTPATIENT)
Dept: HEMATOLOGY ONCOLOGY | Facility: CLINIC | Age: 76
End: 2021-11-01
Payer: MEDICARE

## 2021-11-01 VITALS
TEMPERATURE: 96.8 F | DIASTOLIC BLOOD PRESSURE: 62 MMHG | OXYGEN SATURATION: 98 % | SYSTOLIC BLOOD PRESSURE: 122 MMHG | BODY MASS INDEX: 27.44 KG/M2 | HEART RATE: 73 BPM | WEIGHT: 170 LBS

## 2021-11-01 LAB — ERYTHROCYTE [SEDIMENTATION RATE] IN BLOOD BY WESTERGREN METHOD: 21 MM/HR

## 2021-11-01 PROCEDURE — 36415 COLL VENOUS BLD VENIPUNCTURE: CPT

## 2021-11-01 PROCEDURE — 99214 OFFICE O/P EST MOD 30 MIN: CPT | Mod: 25

## 2021-11-01 NOTE — END OF VISIT
[FreeTextEntry3] : All medical record entries made by the Scribe were at my, Dr. Debby Mohamud, direction and personally dictated by me on 11/01/2021. I have reviewed the chart and agree that the record accurately reflects my personal performance of the history, physical exam, assessment and plan. I have also personally directed, reviewed, and agreed with the chart.

## 2021-11-01 NOTE — ASSESSMENT
[FreeTextEntry1] : Patient is a 76 year old male with a history of multiple medical problems who now presents for hematologic follow-up for immune thrombocytopenia, fatigue, macrocytosis and large granular lymphocytosis. Patient's ecchymoses are likely traumatic and are due to treatment with Warfarin and his thrombocytopenia. Recent drop in platelets possibly due to booster Covid vaccine and recent myocarditis which has resolved.. Patient has had a positive NORMA in the past, but chronic thrombocytopenia and macrocytosis still include a low grade myelodysplastic syndrome in the differential. Have again discussed the possibility of bone marrow studies, which patient wishes to defer. His counts have been relatively stable, will continue to monitor. Reviewed lab work done by Dr. Sidhu last week.. Have ordered NORMA, B12 and folate, CBC, haptoglobin, iron panel, LDH, and sed rate. Have also ordered blood type at patient's request. Patient was advised to call office to discuss results and next appointment date.

## 2021-11-01 NOTE — REVIEW OF SYSTEMS
[Fatigue] : fatigue [Dizziness] : dizziness [Easy Bruising] : a tendency for easy bruising [Negative] : Allergic/Immunologic [Fever] : no fever [Chills] : no chills [Night Sweats] : no night sweats [Chest Pain] : no chest pain [Palpitations] : no palpitations [Shortness Of Breath] : no shortness of breath [Wheezing] : no wheezing [Cough] : no cough [Abdominal Pain] : no abdominal pain [Easy Bleeding] : no tendency for easy bleeding [FreeTextEntry5] : Had myocarditis following Pfizer booster. Treated with steroids. Occurred one month ago

## 2021-11-01 NOTE — PHYSICAL EXAM
[Normal] : affect appropriate [de-identified] : pacemaker left anterior chest wall, RRR. S1, S2, murmur, ectopy,  [de-identified] : ecchymosis  both upper extremities, right trauma related

## 2021-11-01 NOTE — REASON FOR VISIT
[Follow-Up Visit] : a follow-up visit for [FreeTextEntry2] : immune thrombocytopenia, macrocytosis, fatigue, easy bruising

## 2021-11-01 NOTE — HISTORY OF PRESENT ILLNESS
[de-identified] : Patient is a 76 year old male with a history of multiple medical problems including cardiac arrhythmia, neuropathy, IBS, vertigo, reflux who now presents for hematologic follow-up for immune thrombocytopenia, fatigue and macrocytosis. Patient saw Dr. Sidhu last week, at which time the white blood count was 6.6, hemoglobin was 14.3, hematocrit 45.7, and platelet count was 61,000. The differential was normal. CMP and uric acid were normal. At his last visit in August 2021, the MCV was 106.1 and the platelet count was 70,000. Flow cytometry was the same with 8% NK-like T cells, but without immunophenotypic abnormal findings. Sedimentation rate, C-reactive protein, beta-2 microglobulin, LDH, haptoglobin, B12, folate, iron studies, and NORMA were all normal. Patient remains on Warfarin for his atrial fibrillation. He complains of easy bruising, periodic dizziness, and fatigue. About one month ago, patient received the COVID-19 booster vaccine. Three days afterwards, he developed chest pain and saw his PCP Dr. Chowdary, who diagnosed him with myocarditis. He was placed on an 8 day course of steroids, and his symptoms resolved shortly. Denies bleeding from the nose or mouth, hematochezia, hematuria, current chest pain, palpitations, shortness of breath, abdominal pain, fever, sweats, chills, or recent infections. Of note, patient also received the flu vaccine as well.

## 2021-11-02 LAB
ABO + RH PNL BLD: NORMAL
ANISOCYTOSIS BLD QL: SLIGHT
FOLATE SERPL-MCNC: 12.9 NG/ML
GIANT PLATELETS BLD QL SMEAR: PRESENT
HAPTOGLOB SERPL-MCNC: 50 MG/DL
HCT VFR BLD CALC: 45.7 %
HGB BLD-MCNC: 14.7 G/DL
IRON SATN MFR SERPL: 36 %
IRON SERPL-MCNC: 110 UG/DL
LDH SERPL-CCNC: 232 U/L
LYMPHOCYTES # BLD AUTO: 1.45 K/UL
LYMPHOCYTES # BLD AUTO: 1.45 K/UL
LYMPHOCYTES NFR BLD AUTO: 26 %
LYMPHOCYTES NFR BLD AUTO: 26 %
MACROCYTES BLD QL SMEAR: SLIGHT
MAN DIFF?: NORMAL
MCHC RBC-ENTMCNC: 32.2 GM/DL
MCHC RBC-ENTMCNC: 33.7 PG
MCV RBC AUTO: 104.8 FL
MONOCYTES NFR BLD AUTO: 6 %
MONOCYTES NFR BLD AUTO: 6 %
MSMEAR: NORMAL
NEUTROPHILS # BLD AUTO: 3.73 K/UL
NEUTROPHILS # BLD AUTO: 3.73 K/UL
NEUTROPHILS NFR BLD AUTO: 68 %
NEUTROPHILS NFR BLD AUTO: 68 %
PLAT MORPH BLD: ABNORMAL
PLATELET # BLD AUTO: 68 K/UL
RBC # BLD: 4.36 M/UL
RBC # FLD: 14.7 %
RBC BLD AUTO: ABNORMAL
TIBC SERPL-MCNC: 308 UG/DL
UIBC SERPL-MCNC: 198 UG/DL
VIT B12 SERPL-MCNC: 1042 PG/ML
WBC # FLD AUTO: 5.52 K/UL

## 2021-11-08 ENCOUNTER — NON-APPOINTMENT (OUTPATIENT)
Age: 76
End: 2021-11-08

## 2021-11-08 LAB
ANA PAT FLD IF-IMP: ABNORMAL
ANA SER IF-ACNC: ABNORMAL

## 2021-12-01 ENCOUNTER — NON-APPOINTMENT (OUTPATIENT)
Age: 76
End: 2021-12-01

## 2021-12-01 ENCOUNTER — APPOINTMENT (OUTPATIENT)
Dept: OPHTHALMOLOGY | Facility: CLINIC | Age: 76
End: 2021-12-01
Payer: MEDICARE

## 2021-12-01 PROCEDURE — 92133 CPTRZD OPH DX IMG PST SGM ON: CPT

## 2021-12-01 PROCEDURE — 92012 INTRM OPH EXAM EST PATIENT: CPT

## 2021-12-02 ENCOUNTER — APPOINTMENT (OUTPATIENT)
Dept: OTOLARYNGOLOGY | Facility: CLINIC | Age: 76
End: 2021-12-02

## 2022-03-16 ENCOUNTER — NON-APPOINTMENT (OUTPATIENT)
Age: 77
End: 2022-03-16

## 2022-03-22 ENCOUNTER — OUTPATIENT (OUTPATIENT)
Dept: OUTPATIENT SERVICES | Facility: HOSPITAL | Age: 77
LOS: 1 days | End: 2022-03-22
Payer: MEDICARE

## 2022-03-22 DIAGNOSIS — R06.02 SHORTNESS OF BREATH: ICD-10-CM

## 2022-03-22 DIAGNOSIS — Z95.0 PRESENCE OF CARDIAC PACEMAKER: Chronic | ICD-10-CM

## 2022-03-22 DIAGNOSIS — Z98.890 OTHER SPECIFIED POSTPROCEDURAL STATES: Chronic | ICD-10-CM

## 2022-03-22 PROCEDURE — A9500: CPT

## 2022-03-22 PROCEDURE — 93017 CV STRESS TEST TRACING ONLY: CPT

## 2022-03-22 PROCEDURE — 78452 HT MUSCLE IMAGE SPECT MULT: CPT | Mod: 26,MH

## 2022-03-22 PROCEDURE — 93016 CV STRESS TEST SUPVJ ONLY: CPT

## 2022-03-22 PROCEDURE — A9505: CPT

## 2022-03-22 PROCEDURE — 93018 CV STRESS TEST I&R ONLY: CPT

## 2022-03-22 PROCEDURE — 78452 HT MUSCLE IMAGE SPECT MULT: CPT

## 2022-03-25 ENCOUNTER — APPOINTMENT (OUTPATIENT)
Dept: OTOLARYNGOLOGY | Facility: CLINIC | Age: 77
End: 2022-03-25

## 2022-03-25 VITALS
HEIGHT: 66 IN | SYSTOLIC BLOOD PRESSURE: 133 MMHG | DIASTOLIC BLOOD PRESSURE: 63 MMHG | HEART RATE: 60 BPM | WEIGHT: 173.28 LBS

## 2022-03-25 RX ORDER — DOXAZOSIN MESYLATE 4 MG
1 TABLET ORAL
Qty: 0 | Refills: 0 | DISCHARGE

## 2022-03-25 RX ORDER — FINASTERIDE 5 MG/1
1 TABLET, FILM COATED ORAL
Qty: 0 | Refills: 0 | DISCHARGE

## 2022-03-25 RX ORDER — CHOLECALCIFEROL (VITAMIN D3) 125 MCG
1 CAPSULE ORAL
Qty: 0 | Refills: 0 | DISCHARGE

## 2022-03-25 RX ORDER — DIGOXIN 250 MCG
0.03 TABLET ORAL
Qty: 0 | Refills: 0 | DISCHARGE

## 2022-03-25 RX ORDER — CHLORHEXIDINE GLUCONATE 213 G/1000ML
1 SOLUTION TOPICAL ONCE
Refills: 0 | Status: DISCONTINUED | OUTPATIENT
Start: 2022-03-29 | End: 2022-04-12

## 2022-03-25 RX ORDER — WARFARIN SODIUM 2.5 MG/1
1 TABLET ORAL
Qty: 0 | Refills: 0 | DISCHARGE

## 2022-03-25 NOTE — H&P ADULT - BSA (M2)
- cardiology consulted  - given type of stenosis patient will not benefit from TAVR or balloon angioplasty  - angiography prior to surgical intervention  - medication recommendations appreciated      1.88

## 2022-03-25 NOTE — H&P ADULT - BACK
not examined Helical Rim Advancement Flap Text: The defect edges were debeveled with a #15 blade scalpel.  Given the location of the defect and the proximity to free margins (helical rim) a double helical rim advancement flap was deemed most appropriate.  Using a sterile surgical marker, the appropriate advancement flaps were drawn incorporating the defect and placing the expected incisions between the helical rim and antihelix where possible.  The area thus outlined was incised through and through with a #15 scalpel blade.  With a skin hook and iris scissors, the flaps were gently and sharply undermined and freed up.

## 2022-03-25 NOTE — H&P ADULT - HISTORY OF PRESENT ILLNESS
Covid: 3/25 @ [facility]  Cardiologist: Dr Staples  Pharm[facility]: [facility]  Escort: wife    *meds confirmed w/ patient and cross referenced w/ Surescripts    77 yo M [place] (deaf in R ear) w/ PMHx HTN, ?known CAD (pt denies), hx of PFO (s/p repair @ 31yo), afib (on warfarin, last dose 3/25), high degree AVB s/p PPM, ADRIEL (no CPAP), asthma (newly diagnosed, never hospitalized), brain tumor (benign, diagnosed 2017, s/p radiation), vertigo who presented to her outpatient cardiologist c/o MARTIN upon [place]. Denies fevers, chills, cough, CP, palpitations, lightheadedness, n/v, LE edema. NST 3/22/22: small area of mild ischemia in the true apex. Normal LVSF. No WMA.     In light of pt risk factors, CCS class III anginal equivalent symptoms, and positive NST, pt now presents for [facility] w/ possible intervention.  Covid: 3/25 @ [facility]  Cardiologist: Dr Staples  [facility]: [facility]  Escort: wife    *meds confirmed w/ patient and cross referenced w/ Surescripts    75 yo M [place] (deaf in R ear) w/ PMHx HTN, ?known CAD (pt denies), hx of PFO (s/p repair @ 33yo), afib (on warfarin, last dose 3/25), high degree AVB s/p PPM, ADRIEL (no CPAP), asthma (newly diagnosed, never hospitalized), brain tumor (benign, diagnosed 2017, s/p radiation), vertigo who presented to their outpatient cardiologist c/o MARTIN upon [place]. Denies fevers, chills, cough, CP, palpitations, lightheadedness, n/v, LE edema. NST 3/22/22: small area of mild ischemia in the true apex. Normal LVSF. No WMA.     In light of pt risk factors, CCS class III anginal equivalent symptoms, and positive NST, pt now presents for [facility] w/ possible intervention.  Covid: 3/25 @ [facility]  Cardiologist: Dr Staples  [facility]: [facility]  Escort: wife    *meds confirmed w/ patient list, cross referenced w/ Surescripts    77 yo M [place] (deaf in R ear) w/ PMHx HTN, ?known CAD (pt denies), hx of PFO (s/p repair @ 33yo), afib (on warfarin, last dose 3/25), high degree AVB s/p PPM, ADRIEL (no CPAP), asthma (newly diagnosed, never hospitalized), brain tumor (benign, diagnosed 2017, s/p radiation), vertigo who presented to their outpatient cardiologist c/o MARTIN upon [place]. Denies fevers, chills, cough, CP, palpitations, lightheadedness, n/v, LE edema. NST 3/22/22: small area of mild ischemia in the true apex. Normal LVSF. No WMA.     In light of pt risk factors, CCS class III anginal equivalent symptoms, and positive NST, pt now presents for [facility] w/ possible intervention.

## 2022-03-25 NOTE — H&P ADULT - ASSESSMENT
77 yo M [place] (deaf in R ear) w/ PMHx HTN, ?known CAD (pt denies), hx of PFO (s/p repair @ 33yo), afib (on warfarin, last dose 3/25), high degree AVB s/p PPM, ADRIEL (no CPAP), asthma (newly diagnosed, never hospitalized), brain tumor (benign, diagnosed 2017, s/p radiation), vertigo who presented to their outpatient cardiologist c/o MARTIN upon [place].  In light of pt risk factors, CCS class III anginal equivalent symptoms, and positive NST, pt now presents for [facility] w/ possible intervention.     -H/H stable?, last dose of Warfarin 3/25/22, INR _________. Loaded with aspirin ____ mg x 1 and plavix ___ mg x 1  -EF normal and euvolemic on exam, pre cath w/ 250 cc bolus x 1.           Risks & benefits of procedure and alternative therapy have been explained to the patient including but not limited to: allergic reaction, bleeding w/possible need for blood transfusion, infection, renal and vascular compromise, limb damage, arrhythmia, stroke, vessel dissection/perforation, Myocardial infarction, emergent CABG. Informed consent obtained and in chart.       Suitable for moderate sedation.  75 yo M [place] (deaf in R ear) w/ PMHx HTN, ?known CAD (pt denies), hx of PFO (s/p repair @ 33yo), afib (on warfarin, last dose 3/25), high degree AVB s/p PPM, ADRIEL (no CPAP), asthma (newly diagnosed, never hospitalized), brain tumor (benign, diagnosed 2017, s/p radiation), vertigo who presented to their outpatient cardiologist c/o MARTIN upon [place].  In light of pt risk factors, CCS class III anginal equivalent symptoms, and positive NST, pt now presents for [facility] w/ possible intervention.     -H/H stable?, last dose of Warfarin 3/25/22, INR _________. Loaded with aspirin ____ mg x 1 and plavix ___ mg x 1  -EF normal and euvolemic on exam, pre cath w/ 250 cc bolus x 1.         Risks & benefits of procedure and alternative therapy have been explained to the patient including but not limited to: allergic reaction, bleeding w/possible need for blood transfusion, infection, renal and vascular compromise, limb damage, arrhythmia, stroke, vessel dissection/perforation, Myocardial infarction, emergent CABG. Informed consent obtained and in chart.       Suitable for moderate sedation.  77 yo M [place] (deaf in R ear) w/ PMHx HTN, ?known CAD (pt denies), hx of PFO (s/p repair @ 33yo), afib (on warfarin, last dose 3/25), high degree AVB s/p PPM, ADRIEL (no CPAP), asthma (newly diagnosed, never hospitalized), brain tumor (benign, diagnosed 2017, s/p radiation), vertigo who presented to their outpatient cardiologist c/o MARTIN upon [place].  In light of pt risk factors, CCS class III anginal equivalent symptoms, and positive NST, pt now presents for [facility] w/ possible intervention.     -H/H stable, last dose of Warfarin 3/25/22, INR 1.4, Plts 70, Fellow Jardat aware and loaded with aspirin 325 mg x 1 and plavix 600 mg x 1  -EF normal and euvolemic on exam, pre cath w/ 250 cc bolus x 1.         Risks & benefits of procedure and alternative therapy have been explained to the patient including but not limited to: allergic reaction, bleeding w/possible need for blood transfusion, infection, renal and vascular compromise, limb damage, arrhythmia, stroke, vessel dissection/perforation, Myocardial infarction, emergent CABG. Informed consent obtained and in chart.       Suitable for moderate sedation.

## 2022-03-29 ENCOUNTER — OUTPATIENT (OUTPATIENT)
Dept: OUTPATIENT SERVICES | Facility: HOSPITAL | Age: 77
LOS: 1 days | Discharge: HOME | End: 2022-03-29
Payer: MEDICARE

## 2022-03-29 DIAGNOSIS — Z98.890 OTHER SPECIFIED POSTPROCEDURAL STATES: Chronic | ICD-10-CM

## 2022-03-29 DIAGNOSIS — Z95.0 PRESENCE OF CARDIAC PACEMAKER: Chronic | ICD-10-CM

## 2022-03-29 LAB
A1C WITH ESTIMATED AVERAGE GLUCOSE RESULT: 5 % — SIGNIFICANT CHANGE UP (ref 4–5.6)
ALBUMIN SERPL ELPH-MCNC: 5.1 G/DL — HIGH (ref 3.3–5)
ALP SERPL-CCNC: 89 U/L — SIGNIFICANT CHANGE UP (ref 40–120)
ALT FLD-CCNC: 29 U/L — SIGNIFICANT CHANGE UP (ref 10–45)
ANION GAP SERPL CALC-SCNC: 11 MMOL/L — SIGNIFICANT CHANGE UP (ref 5–17)
APTT BLD: 35.4 SEC — SIGNIFICANT CHANGE UP (ref 27.5–35.5)
AST SERPL-CCNC: 23 U/L — SIGNIFICANT CHANGE UP (ref 10–40)
BASOPHILS # BLD AUTO: 0.02 K/UL — SIGNIFICANT CHANGE UP (ref 0–0.2)
BASOPHILS NFR BLD AUTO: 0.3 % — SIGNIFICANT CHANGE UP (ref 0–2)
BILIRUB DIRECT SERPL-MCNC: 0.3 MG/DL — SIGNIFICANT CHANGE UP (ref 0–0.3)
BILIRUB INDIRECT FLD-MCNC: 1.1 MG/DL — HIGH (ref 0.2–1)
BILIRUB SERPL-MCNC: 1.4 MG/DL — HIGH (ref 0.2–1.2)
BUN SERPL-MCNC: 12 MG/DL — SIGNIFICANT CHANGE UP (ref 7–23)
CALCIUM SERPL-MCNC: 9.7 MG/DL — SIGNIFICANT CHANGE UP (ref 8.4–10.5)
CHLORIDE SERPL-SCNC: 100 MMOL/L — SIGNIFICANT CHANGE UP (ref 96–108)
CHOLEST SERPL-MCNC: 130 MG/DL — SIGNIFICANT CHANGE UP
CO2 SERPL-SCNC: 27 MMOL/L — SIGNIFICANT CHANGE UP (ref 22–31)
CREAT SERPL-MCNC: 0.87 MG/DL — SIGNIFICANT CHANGE UP (ref 0.5–1.3)
EGFR: 89 ML/MIN/1.73M2 — SIGNIFICANT CHANGE UP
EOSINOPHIL # BLD AUTO: 0.01 K/UL — SIGNIFICANT CHANGE UP (ref 0–0.5)
EOSINOPHIL NFR BLD AUTO: 0.1 % — SIGNIFICANT CHANGE UP (ref 0–6)
ESTIMATED AVERAGE GLUCOSE: 97 MG/DL — SIGNIFICANT CHANGE UP (ref 68–114)
GLUCOSE SERPL-MCNC: 109 MG/DL — HIGH (ref 70–99)
HCT VFR BLD CALC: 44.8 % — SIGNIFICANT CHANGE UP (ref 39–50)
HDLC SERPL-MCNC: 43 MG/DL — SIGNIFICANT CHANGE UP
HGB BLD-MCNC: 14.9 G/DL — SIGNIFICANT CHANGE UP (ref 13–17)
IMM GRANULOCYTES NFR BLD AUTO: 0.3 % — SIGNIFICANT CHANGE UP (ref 0–1.5)
INR BLD: 1.45 — HIGH (ref 0.88–1.16)
ISTAT INR: 1.2 — HIGH (ref 0.88–1.16)
ISTAT PT: 14.2 SEC — HIGH (ref 10–12.9)
LIPID PNL WITH DIRECT LDL SERPL: 74 MG/DL — SIGNIFICANT CHANGE UP
LYMPHOCYTES # BLD AUTO: 2.05 K/UL — SIGNIFICANT CHANGE UP (ref 1–3.3)
LYMPHOCYTES # BLD AUTO: 29.2 % — SIGNIFICANT CHANGE UP (ref 13–44)
MCHC RBC-ENTMCNC: 33.3 GM/DL — SIGNIFICANT CHANGE UP (ref 32–36)
MCHC RBC-ENTMCNC: 34.4 PG — HIGH (ref 27–34)
MCV RBC AUTO: 103.5 FL — HIGH (ref 80–100)
MONOCYTES # BLD AUTO: 0.44 K/UL — SIGNIFICANT CHANGE UP (ref 0–0.9)
MONOCYTES NFR BLD AUTO: 6.3 % — SIGNIFICANT CHANGE UP (ref 2–14)
NEUTROPHILS # BLD AUTO: 4.49 K/UL — SIGNIFICANT CHANGE UP (ref 1.8–7.4)
NEUTROPHILS NFR BLD AUTO: 63.8 % — SIGNIFICANT CHANGE UP (ref 43–77)
NON HDL CHOLESTEROL: 87 MG/DL — SIGNIFICANT CHANGE UP
NRBC # BLD: 0 /100 WBCS — SIGNIFICANT CHANGE UP (ref 0–0)
PLATELET # BLD AUTO: 71 K/UL — LOW (ref 150–400)
POCT ISTAT CREATININE: 0.8 MG/DL — SIGNIFICANT CHANGE UP (ref 0.5–1.3)
POTASSIUM SERPL-MCNC: 3.7 MMOL/L — SIGNIFICANT CHANGE UP (ref 3.5–5.3)
POTASSIUM SERPL-SCNC: 3.7 MMOL/L — SIGNIFICANT CHANGE UP (ref 3.5–5.3)
PROT SERPL-MCNC: 7.9 G/DL — SIGNIFICANT CHANGE UP (ref 6–8.3)
PROTHROM AB SERPL-ACNC: 17.3 SEC — HIGH (ref 10.5–13.4)
RBC # BLD: 4.33 M/UL — SIGNIFICANT CHANGE UP (ref 4.2–5.8)
RBC # FLD: 14.8 % — HIGH (ref 10.3–14.5)
SODIUM SERPL-SCNC: 138 MMOL/L — SIGNIFICANT CHANGE UP (ref 135–145)
TRIGL SERPL-MCNC: 63 MG/DL — SIGNIFICANT CHANGE UP
WBC # BLD: 7.03 K/UL — SIGNIFICANT CHANGE UP (ref 3.8–10.5)
WBC # FLD AUTO: 7.03 K/UL — SIGNIFICANT CHANGE UP (ref 3.8–10.5)

## 2022-03-29 PROCEDURE — 85025 COMPLETE CBC W/AUTO DIFF WBC: CPT

## 2022-03-29 PROCEDURE — 80053 COMPREHEN METABOLIC PANEL: CPT

## 2022-03-29 PROCEDURE — 85730 THROMBOPLASTIN TIME PARTIAL: CPT

## 2022-03-29 PROCEDURE — 99152 MOD SED SAME PHYS/QHP 5/>YRS: CPT

## 2022-03-29 PROCEDURE — C1894: CPT

## 2022-03-29 PROCEDURE — 93010 ELECTROCARDIOGRAM REPORT: CPT

## 2022-03-29 PROCEDURE — 82565 ASSAY OF CREATININE: CPT

## 2022-03-29 PROCEDURE — 80061 LIPID PANEL: CPT

## 2022-03-29 PROCEDURE — 93458 L HRT ARTERY/VENTRICLE ANGIO: CPT

## 2022-03-29 PROCEDURE — 83036 HEMOGLOBIN GLYCOSYLATED A1C: CPT

## 2022-03-29 PROCEDURE — 36415 COLL VENOUS BLD VENIPUNCTURE: CPT

## 2022-03-29 PROCEDURE — 85610 PROTHROMBIN TIME: CPT

## 2022-03-29 PROCEDURE — 93458 L HRT ARTERY/VENTRICLE ANGIO: CPT | Mod: 26

## 2022-03-29 PROCEDURE — 82248 BILIRUBIN DIRECT: CPT

## 2022-03-29 PROCEDURE — 93005 ELECTROCARDIOGRAM TRACING: CPT

## 2022-03-29 PROCEDURE — C1887: CPT

## 2022-03-29 PROCEDURE — C1769: CPT

## 2022-03-29 PROCEDURE — 99153 MOD SED SAME PHYS/QHP EA: CPT

## 2022-03-29 RX ORDER — WARFARIN SODIUM 2.5 MG/1
1 TABLET ORAL
Qty: 0 | Refills: 0 | DISCHARGE

## 2022-03-29 RX ORDER — CHOLECALCIFEROL (VITAMIN D3) 125 MCG
1 CAPSULE ORAL
Qty: 0 | Refills: 0 | DISCHARGE

## 2022-03-29 RX ORDER — PREGABALIN 225 MG/1
1 CAPSULE ORAL
Qty: 0 | Refills: 0 | DISCHARGE

## 2022-03-29 RX ORDER — CLOPIDOGREL BISULFATE 75 MG/1
600 TABLET, FILM COATED ORAL ONCE
Refills: 0 | Status: COMPLETED | OUTPATIENT
Start: 2022-03-29 | End: 2022-03-29

## 2022-03-29 RX ORDER — DOXAZOSIN MESYLATE 4 MG
1 TABLET ORAL
Qty: 0 | Refills: 0 | DISCHARGE

## 2022-03-29 RX ORDER — ASPIRIN/CALCIUM CARB/MAGNESIUM 324 MG
325 TABLET ORAL ONCE
Refills: 0 | Status: COMPLETED | OUTPATIENT
Start: 2022-03-29 | End: 2022-03-29

## 2022-03-29 RX ORDER — TOLTERODINE TARTRATE 1 MG/1
1 TABLET, FILM COATED ORAL
Qty: 0 | Refills: 0 | DISCHARGE

## 2022-03-29 RX ORDER — RAMIPRIL 5 MG
1 CAPSULE ORAL
Qty: 0 | Refills: 0 | DISCHARGE

## 2022-03-29 RX ORDER — SODIUM CHLORIDE 9 MG/ML
250 INJECTION INTRAMUSCULAR; INTRAVENOUS; SUBCUTANEOUS ONCE
Refills: 0 | Status: COMPLETED | OUTPATIENT
Start: 2022-03-29 | End: 2022-03-29

## 2022-03-29 RX ORDER — SODIUM CHLORIDE 9 MG/ML
500 INJECTION INTRAMUSCULAR; INTRAVENOUS; SUBCUTANEOUS
Refills: 0 | Status: DISCONTINUED | OUTPATIENT
Start: 2022-03-29 | End: 2022-04-12

## 2022-03-29 RX ORDER — DIGOXIN 250 MCG
0.03 TABLET ORAL
Qty: 0 | Refills: 0 | DISCHARGE

## 2022-03-29 RX ORDER — METOPROLOL TARTRATE 50 MG
1 TABLET ORAL
Qty: 0 | Refills: 0 | DISCHARGE

## 2022-03-29 RX ORDER — POTASSIUM CHLORIDE 20 MEQ
40 PACKET (EA) ORAL ONCE
Refills: 0 | Status: COMPLETED | OUTPATIENT
Start: 2022-03-29 | End: 2022-03-29

## 2022-03-29 RX ADMIN — CLOPIDOGREL BISULFATE 600 MILLIGRAM(S): 75 TABLET, FILM COATED ORAL at 08:03

## 2022-03-29 RX ADMIN — Medication 325 MILLIGRAM(S): at 08:04

## 2022-03-29 RX ADMIN — SODIUM CHLORIDE 500 MILLILITER(S): 9 INJECTION INTRAMUSCULAR; INTRAVENOUS; SUBCUTANEOUS at 08:04

## 2022-03-29 RX ADMIN — Medication 40 MILLIEQUIVALENT(S): at 08:07

## 2022-03-29 NOTE — PROGRESS NOTE ADULT - SUBJECTIVE AND OBJECTIVE BOX
Interventional Cardiology PA SDA Discharge Note    Patient without complaints. Ambulated and voided without difficulties    Afebrile, VSS    Ext:  Right Radial:  no hematoma, no bleeding, dressing; C/D/I  Pulses:    intact RAD/DP/PT to baseline     A/P:  77 yo M [place] (deaf in R ear) w/ PMHx HTN, ?known CAD (pt denies), hx of PFO (s/p repair @ 33yo), afib (on warfarin, last dose 3/25), high degree AVB s/p PPM, ADRIEL (no CPAP), asthma (newly diagnosed, never hospitalized), brain tumor (benign, diagnosed 2017, s/p radiation), vertigo who presented to their outpatient cardiologist c/o MARTIN upon [place]. Denies fevers, chills, cough, CP, palpitations, lightheadedness, n/v, LE edema. NST 3/22/22: small area of mild ischemia in the true apex. Normal LVSF. No WMA.     In light of pt risk factors, CCS class III anginal equivalent symptoms, and positive NST, pt now presents for [facility] w/ possible intervention.     Pt is s/p Diagnostic Cath 3/29/22 revealing LM no significant Dz.  LAD LI, mLAD bridge.  LCx LI, RCA LI.  LVEDP 12, LVEF 60%.  +2MR.     1.	Stable for discharge as per attending Dr. Jarrell after bed rest, pt voids, groin/wrist stable and 30 minutes of ambulation.  2.	Follow-up with Cardiologist, Dr. Andino in 1-2 weeks  3.	Discharged forms signed and copies in chart    Interventional Cardiology PA SDA Discharge Note    Patient without complaints. Ambulated and voided without difficulties    Afebrile, VSS    Ext:  Right Radial:  no hematoma, no bleeding, dressing; C/D/I  Pulses:    intact RAD/DP/PT to baseline     A/P:  75 yo M [place] (deaf in R ear) w/ PMHx HTN, ?known CAD (pt denies), hx of PFO (s/p repair @ 31yo), afib (on warfarin, last dose 3/25), high degree AVB s/p PPM, ADRIEL (no CPAP), asthma (newly diagnosed, never hospitalized), brain tumor (benign, diagnosed 2017, s/p radiation), vertigo who presented to their outpatient cardiologist c/o MARTIN upon [place]. Denies fevers, chills, cough, CP, palpitations, lightheadedness, n/v, LE edema. NST 3/22/22: small area of mild ischemia in the true apex. Normal LVSF. No WMA.     In light of pt risk factors, CCS class III anginal equivalent symptoms, and positive NST, pt now presents for [facility] w/ possible intervention.     Pt is s/p Diagnostic Cath 3/29/22 revealing LM no significant Dz.  LAD LI, mLAD bridge.  LCx LI, RCA LI.  LVEDP 12, LVEF 60%.  +2MR.     1.	Stable for discharge as per attending Dr. Jarrell after bed rest, pt voids, groin/wrist stable and 30 minutes of ambulation.  2.	Follow-up with Cardiologist, Dr. Staples in 1-2 weeks  3.	Discharged forms signed and copies in chart    Interventional Cardiology PA SDA Discharge Note    Patient without complaints. Ambulated and voided without difficulties    Afebrile, VSS    Ext:  Right Radial:  no hematoma, no bleeding, dressing; C/D/I  Pulses:    intact RAD/DP/PT to baseline     A/P:  75 yo M [place] (deaf in R ear) w/ PMHx HTN, ?known CAD (pt denies), hx of PFO (s/p repair @ 33yo), afib (on warfarin, last dose 3/25), high degree AVB s/p PPM, ADRIEL (no CPAP), asthma (newly diagnosed, never hospitalized), brain tumor (benign, diagnosed 2017, s/p radiation), vertigo who presented to their outpatient cardiologist c/o MARTIN upon [place]. Denies fevers, chills, cough, CP, palpitations, lightheadedness, n/v, LE edema. NST 3/22/22: small area of mild ischemia in the true apex. Normal LVSF. No WMA.     In light of pt risk factors, CCS class III anginal equivalent symptoms, and positive NST, pt now presents for [facility] w/ possible intervention.     Pt is s/p Diagnostic Cath 3/29/22 revealing LM no significant Dz.  LAD LI, mLAD bridge.  LCx LI, RCA LI.  LVEDP 12, LVEF 60%.  +2MR.     OF NOTE:  PT WITH H/O AF ON COUMADIN WITH INR 1.45 TODAY.  PER DR STAPLES, PT IS TO TAKE COUMADIN 6MG ON TUESDAY, 5MG ON WEDNESDAY, 5MG ON THURSDAY AND RETURN TO HIS REGULAR HOME DOSE 3MG ON FRIDAY.  PT IS TO CALL DR. STAPLES'S OFFICE TOMORROW FOR AN APPOINTMENT ON MONDAY OR TUESDAY FOR INR CHECK.     He is to continue all his other home medications as prescribed.     1.	Stable for discharge as per attending Dr. Jarrell after bed rest, pt voids, groin/wrist stable and 30 minutes of ambulation.  2.	Follow-up with Cardiologist, Dr. Staples in 1-2 weeks  3.	Discharged forms signed and copies in chart

## 2022-03-30 DIAGNOSIS — R94.39 ABNORMAL RESULT OF OTHER CARDIOVASCULAR FUNCTION STUDY: ICD-10-CM

## 2022-03-30 DIAGNOSIS — I25.10 ATHEROSCLEROTIC HEART DISEASE OF NATIVE CORONARY ARTERY WITHOUT ANGINA PECTORIS: ICD-10-CM

## 2022-05-25 ENCOUNTER — APPOINTMENT (OUTPATIENT)
Dept: OTOLARYNGOLOGY | Facility: CLINIC | Age: 77
End: 2022-05-25

## 2022-05-26 ENCOUNTER — NON-APPOINTMENT (OUTPATIENT)
Age: 77
End: 2022-05-26

## 2022-06-10 ENCOUNTER — RESULT REVIEW (OUTPATIENT)
Age: 77
End: 2022-06-10

## 2022-06-10 ENCOUNTER — APPOINTMENT (OUTPATIENT)
Dept: CT IMAGING | Facility: HOSPITAL | Age: 77
End: 2022-06-10

## 2022-06-10 ENCOUNTER — OUTPATIENT (OUTPATIENT)
Dept: OUTPATIENT SERVICES | Facility: HOSPITAL | Age: 77
LOS: 1 days | End: 2022-06-10
Payer: MEDICARE

## 2022-06-10 DIAGNOSIS — Z98.890 OTHER SPECIFIED POSTPROCEDURAL STATES: Chronic | ICD-10-CM

## 2022-06-10 DIAGNOSIS — Z95.0 PRESENCE OF CARDIAC PACEMAKER: Chronic | ICD-10-CM

## 2022-06-10 LAB — POCT ISTAT CREATININE: 0.7 MG/DL — SIGNIFICANT CHANGE UP (ref 0.5–1.3)

## 2022-06-10 PROCEDURE — 70481 CT ORBIT/EAR/FOSSA W/DYE: CPT | Mod: MH

## 2022-06-10 PROCEDURE — 82565 ASSAY OF CREATININE: CPT

## 2022-06-10 PROCEDURE — 70481 CT ORBIT/EAR/FOSSA W/DYE: CPT | Mod: 26,MH

## 2022-06-16 ENCOUNTER — APPOINTMENT (OUTPATIENT)
Dept: RADIATION ONCOLOGY | Facility: CLINIC | Age: 77
End: 2022-06-16
Payer: MEDICARE

## 2022-06-16 PROCEDURE — 99441: CPT | Mod: 95

## 2022-06-16 NOTE — PHYSICAL EXAM
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

## 2022-06-16 NOTE — HISTORY OF PRESENT ILLNESS
[FreeTextEntry1] : Mr. 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 \par \par 06/16/2022: Follow Up: Ms. Vick denies any headache, blurry vision, double vision, dizziness, nausea or gait imbalance.Patient had CT scan of head and Internal Auditory canals done on 06/10/2022. 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 Dawson returns for routine follow up. He was last seen on 9/17/2020. Plan was for CT Maxillofacial to assess right glomus jugulare tumor and orbital lesions. He reports he was just seen by Dr. Vazquez who removed excess cerumen from his right ear. Patient reports that on some days of the weak, he feels that he crosses his legs as he walks. Sometimes he needs assistance from him wife when it happens. He has gone to vestibular therapy in the past and won't go back, says it was " a waste of time." Otherwise, denies headaches, dizziness, vertigo. Denies changes in vision or hearing from baseline; Denies change in taste of smell. Endorses that he had a recent sinus infection for which he receive antibiotics from Dr. Vazquez, which has resolved. Denies all other ROS. \par \par Patient had a CT Maxillofacial on 6/21/21. \par CT Maxillofacial 6/21/21: Stable right glomus jugulare tumor. Follow up of orbital lesions not recommended.\par \par Since his last follow up, he has maintained follow up with ENT Dr. Vazquez (last seen today 6/24/21) , Dr. Martin (last seen Jan 2021) and Heme Dr. Mohamud (last seen Feb 2021). \par \par ______________\par 9/17/20 - Follow-up\par Mr. Vick returns for routine follow-up.  He was last seen on 12/12/19.  Plan was for CT maxillofacial and f/u in 9 months, continue f/u with Dr. Martin, encouraged vestibular therapy, continue f/u with cardiology and hematology.  \par \par CT maxillofacial 9/15/20\par - Stable appearance of right glomus jugulare. \par - Variable size of enhancing lesion of the left orbit supports vasoformative etiology; no significant interval change is identified in bilateral orbital lesions, as above. \par \par He last saw Dr. Martin on 6/15/20.  Plan is for f/u in December.  He states he had vestibular therapy and removal of cerumen.  He states room spinning has resolved.  He continues to have intermittent dizziness.  He recently switched finasteride to tolterodine and discontinued Cialis with some improvement.  He last saw cardiology Dr. Sosa on 6/23.  He last saw hematology Dr. Mohamud on 9/14/20.  Today, he reports feeling well overall and is without complaints. \par \par 12/12/19 - Follow-up\par Mr. Vick returns for routine follow-up.  He was last seen on 5/29/19.  Plan was for DOTATATE PET/CT, f/u with Dr. Sosa to discuss if MRI can be done with pacemaker, f/u with Dr. Martin regarding vestibular therapy.  We confirmed that his pacemaker is NOT MRI compatible.  \par \par DOTATATE PET/CT 6/6/19 - Impression: There is again marked DOTATATE activity in the known right glomus jugulare tumor. No abnormal dilated activity is identified in the orbits to suggest metastatic spread of disease. There is trace DOTATATE activity in the left mastoid region and region that is becoming more sclerotic, probably benign. \par \par CT maxillofacial with contrast 6/10/19 - IMPRESSION: Noncontrast CT shows symmetric soft tissue density of recently discovered orbital masses. The one on the left showed avid enhancement on recent temporal bone CT whereas the right lesion did not. 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 \par Given stability of right glomus tumor, surveillance imaging of both the primary tumor and these orbital lesions may be accomplished with contrast-enhanced CT of the face. \par \par CT maxillofacial with contrast 12/5/19 - IMPRESSION: Stable appearance to bilateral orbital soft tissue nodules, as above. \par \par He last saw Dr. Laguna on 7/15/19.  Plan was for PRN follow-up.  Today, he reports that approximately 2-3 weeks ago his vertigo symptoms change from a feeling of dizziness to room spinning.  He states this caused him to fall once.  He last saw Dr. Martin yesterday and was referred for vestibular rehab.  He otherwise feels well and is without complaints.  He denies fevers, chills, headaches, dyspnea, chest pain, palpitations.  \par \par 5/29/19- FOLLOW UP\par Mr Vick returns today for routine follow up. She was last seen here on 12/18/18. The plan at that time was to return in 6 months for follow up and CT temporal bone w/ contrast, continue biotene, follow up with Derek Martin, Cade, RUSTY and Elda. Encouraged to increase activity. \par \par CT temporal bones 5/23/19 - Continued stability in size and extent of right glomus jugulare tumor compared to May 2018.  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 These show indolent growth and behavior without invasion of the bony orbit and deviation, not invasion, of adjacent muscles. 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 Differential diagnosis may include primary orbital meningioma or possible inflammatory/granulomatous process although these are felt less likely and rarer possibilities. \par \par He last saw Dr. Mohamud for f/u of immune thrombocytopenia and macrocytosis on 4/30/19, platelet count was low but stable at 75,000, H/H and WBC were normal, .2, chemistries unremarkable, rheumatoid factor normal, NORMA +1 320 homogenous.  He is seeing Rheumatologist, Dr. Eloise Mcdonald, and states he was ruled out for rheumatoid arthritis.  He last saw Dr. Martin on 11/9/2018; he was recommended for vestibular rehab, which he has not done.  He is requesting a referral to a vestibular therapist near his home.  He last saw Dr. La (vascular) on 10/10/18.  He last saw PMD, Dr. Chowdary a few months ago.  He is planning to see a new electrophysiologist, Dr. Sosa, for pacemaker management.  Today, he reports feeling well.  He states that his vertigo symptoms are unchanged.  He denies fevers, chills, headaches, visual and hearing disturbances, unilateral weakness, dyspnea, chest pain, palpitations, nausea, vomiting, constipation and diarrhea.  \par \par 12/18/18-Follow up\par At his last visit he was recovering well but continued to complain of fatigue and vertigo. He followed up with Dr. Martin on 11/9/18 for vertigo, and was advised to have vestibular therapy, which the patient declined.  He consulted with Dr. 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 He states that vertigo is better and notes that he declined vestibular therapy. Denies headaches or visual changes. Recently saw opthalmologist and was told eye exam normal. His wife continues to deal with complications of a stroke and now has Graves disease.\par \par \par 11/1/18-Follow up\par At his last visit he complained of vertigo and requested to try meclizine after discussion with his cardiologist. He also had complaints of dry mouth and was using biotene. He was advised to follow up with Dr. Martin.\par \par Notably, he followed up with Derek Martin and Chanel. He consulted with Dr. Sy (vascular) for leg pain and does not require intervention at this time\par \par Today he states that he has felt "total exhaustion" and has had vertigo since August. He has been seen by hematologist Dr. Mohamud for cytopenia of some sort, he does not know what.  He  states that he tried meclizine for 3 days which did not helped. He notes dry mouth for which water helps and occasional headaches. He has followed up with his dentist and PMD. He states that he has an appt. to see Dr. Martin 12/13/2018. He has been working out the last 4 days and feels perkier since then.\par \par \par 6/4/18- Today Mr. Vick presents today for follow up. Since he saw us last in December 2017 he has seen Dr. Martin twice since he saw us- once for cerumen removal and once for concern about dysphagia. \par CT IAC done on 5/31/18 shows decrease in size of right glomus jugulare tumor, previously max dimension 2.7cm, now 2.5cm. \par \par Today he notes dryness of mouth, not using mouth rinses. Using prevident toothpaste but not doing head, mouth and neck exercises. Appetite good. Denies dysphagia. His main concerns are vertigo and dryness. He thinks he has used meclizine in the past but cannot recall.\par \par HISTORY: \par Mr. Vick is a 70 yo gentleman who recently received a radiologic diagnosis of right glomus jugulare tumor. He has had vertigo for about 9 weeks. He always feels off balance when standing or walking and often feels like the room is spinning. He reports this sensation is at times 10/10 in severity. Walking makes it worse.  However, he noticed after a brief course of steroids early in his diagnosis, a significant decrease in dizziness and improvement in his ability to walk.\par \par He was referred to Dr. Hastings (ENT) by his PCP eventually and imaging with a CT head on 10/6/16 identified a lesion of the right jugular foramen/ jugular bulb, thought to be consistent with a right-sided glomus tumor. There was an expansile permeative lytic lesion centered upon the right jugular foramen with extension medially into the cerebellopontine angle and slightly inferiorly into the upper neck soft tissues measuring approximately 2.8 x 2 x 2.7 cm.  There was partial erosion of the floor of the internal auditory canal and erosion of the carotid canal.  Additionally, there appeared to be erosion overlying the vestibule and posterior semicircular canal.\par \par Of note, in 2015 he had a CT head done at [facility] for headaches and on my review, it appears that the mass may have been present on the scan as well. \par \par PET/CT here at [facility] on 10/11/16 showed area of FDG uptake at right jugular bulb consistent with known tumor.   Also, hypodense lesion was noted in the right lobe of the liver without any significant FDG uptake.\par \par On 10/12/2016, he had audiology testing and was noted to have asymmetry in total discrimination with the right ear being worse.\par \par The patient denies vision changes, facial numbness or weakness, vision changes but does admit to around ~50% hearing loss on the right associate with his vertigo.  He admits to dysphagia with a foreign body sensation in his throat when swallowing liquids and solids. His vertigo prevents him from going about his usual activities. He is typically active and works out with a  twice/wk, but has slowed down since the symptoms began.   \par \par 6/27/17\par CT temporal bone w/ contrast on 3/15/17 showed overall stable glomus tumor, increased in size by 2mm. This was reviewed in tumor board and group consensus was 3 month follow up. CT 6/23/17 shows stable to slightly decreased size of the left jugular tumor. I reviewed his images in neuro-oncology tumor Board yesterday and the recommendation was for CT scan in 6 months.\par He is back to his normal activity. He recently had a battery change to his defibrillator. He has been following regularly with his cardiologist and other physicians. He is wondering if he can have meclizine for vertigo since his symptoms  continue to recur. He was recently in the emergency room for vertigo and his symptoms improved after receiving IV fluids.\par \par 12/4/17-Since his last visit he has continued to follow with Dr. Locke. He has seen Dr. 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 Reports episodic vertigo/dizziness for which he has been seeing Dr. Martin who started him on meclizine with good effect. \par CT brain 11/16/17 findings:   noted lesion measures approximately 2.2cm in craniocaudad dimension, decreased compared to temporal bone CT where it measured 2.6cm. No associated carotid stenosis or occlusion. Jugular vein patent. \par Mr. Vick feels generally well, c/o some fatigue, but is able to stay physically active and exercise throughout the week. Notes occasional vertigo and dizziness.  Appetite is normal, no sore throat or discomfort upon swallowing, but notes dry mouth still remains the same. \par

## 2022-06-16 NOTE — REVIEW OF SYSTEMS
[Vomiting: Grade 0] : Vomiting: Grade 0 [Edema Limbs: Grade 0] : Edema Limbs: Grade 0  [Swollen Glands] : no swollen glands [FreeTextEntry4] :  deaf right ear; s/p cerumen removal in right ear today 6/24/21 [de-identified] : vertigo

## 2022-06-23 ENCOUNTER — APPOINTMENT (OUTPATIENT)
Dept: RADIATION ONCOLOGY | Facility: CLINIC | Age: 77
End: 2022-06-23

## 2022-09-13 ENCOUNTER — APPOINTMENT (OUTPATIENT)
Dept: OTOLARYNGOLOGY | Facility: CLINIC | Age: 77
End: 2022-09-13

## 2022-09-13 VITALS
WEIGHT: 170 LBS | HEART RATE: 77 BPM | DIASTOLIC BLOOD PRESSURE: 65 MMHG | OXYGEN SATURATION: 98 % | BODY MASS INDEX: 27.32 KG/M2 | SYSTOLIC BLOOD PRESSURE: 161 MMHG | HEIGHT: 66 IN | TEMPERATURE: 98 F | RESPIRATION RATE: 16 BRPM

## 2022-09-13 DIAGNOSIS — H61.21 IMPACTED CERUMEN, RIGHT EAR: ICD-10-CM

## 2022-09-13 PROCEDURE — 99213 OFFICE O/P EST LOW 20 MIN: CPT | Mod: 25

## 2022-09-13 PROCEDURE — 69210 REMOVE IMPACTED EAR WAX UNI: CPT

## 2022-09-13 NOTE — PROCEDURE
[Cerumen Impaction] : Cerumen Impaction [Same] : same as the Pre Op Dx. [] : Removal of Cerumen [FreeTextEntry6] : r copious cerumen removed with suction

## 2022-09-13 NOTE — HISTORY OF PRESENT ILLNESS
[de-identified] : 75 y/o M last seen in 1/2021 here with his wife presenting with r "hissing" sound for the past two days. He has h/o large glomus jugulare tumor and has had xrt. He had CT scan in June 2022 and it was stable when compared with May 2019. he also c/o r aural fullness and positional vertigo. 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 He has been having cerumen removed by another md.

## 2022-09-13 NOTE — REASON FOR VISIT
[Subsequent Evaluation] : a subsequent evaluation for [FreeTextEntry2] : vertigo and r aural fullness, glomus jugulare

## 2022-09-13 NOTE — PHYSICAL EXAM
[de-identified] : r copious cerumen/  blod removed with peroxide and suction under microsocpe atraumatically; eac irritated [Midline] : trachea located in midline position [] : [place]-Hall[place] test is negative [Normal] : no rashes

## 2022-09-13 NOTE — ASSESSMENT
[FreeTextEntry1] : r cerumen removed ear felt better\par vng ordered to try to understand his new vertigo sx - he does not want v rehab - offered\par rtc with vng

## 2022-09-23 ENCOUNTER — APPOINTMENT (OUTPATIENT)
Dept: OTOLARYNGOLOGY | Facility: CLINIC | Age: 77
End: 2022-09-23

## 2022-09-23 VITALS
DIASTOLIC BLOOD PRESSURE: 76 MMHG | SYSTOLIC BLOOD PRESSURE: 138 MMHG | WEIGHT: 170 LBS | RESPIRATION RATE: 16 BRPM | HEIGHT: 66 IN | TEMPERATURE: 97.1 F | BODY MASS INDEX: 27.32 KG/M2 | HEART RATE: 77 BPM | OXYGEN SATURATION: 97 %

## 2022-09-23 PROCEDURE — 92537 CALORIC VSTBLR TEST W/REC: CPT

## 2022-09-23 PROCEDURE — 92540 BASIC VESTIBULAR EVALUATION: CPT

## 2022-09-23 PROCEDURE — 99213 OFFICE O/P EST LOW 20 MIN: CPT

## 2022-09-23 NOTE — ASSESSMENT
[FreeTextEntry1] : r glomus jugulare tumor, vertigo\par -VNG showed r unilateral vestibular weakness, low gain- results reviewed with pt and his wife\par -recommended vestibular therapy- given script and provided list of facilities \par -recommend follow up with neurologist- pt said he is going to see one in November\par RTC in 2 months and sooner as needed; will plan to check next mri 6 mo from prior; f/u here 2 mo or less

## 2022-09-23 NOTE — DATA REVIEWED
[de-identified] : VNG showed r unilateral vestibular weakness, low gain- results reviewed with pt and his wife [de-identified] : mri reviewed as well

## 2022-09-23 NOTE — HISTORY OF PRESENT ILLNESS
[de-identified] : 10 day followup visit for this 77 y/o M with his wife with vertigo. He has h/o large glomus jugulare tumor and has had xrt. He is here to review VNG. He states vertigo has been worse over the past week. At last visit cerumen was removed from r ear and he was put on ofloxacin drops for low grade skin irritation. He feels r aural fullness has improved. 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

## 2023-01-19 ENCOUNTER — APPOINTMENT (OUTPATIENT)
Dept: HEMATOLOGY ONCOLOGY | Facility: CLINIC | Age: 78
End: 2023-01-19

## 2023-01-23 ENCOUNTER — EMERGENCY (EMERGENCY)
Facility: HOSPITAL | Age: 78
LOS: 1 days | Discharge: HOME | End: 2023-01-23
Attending: STUDENT IN AN ORGANIZED HEALTH CARE EDUCATION/TRAINING PROGRAM | Admitting: STUDENT IN AN ORGANIZED HEALTH CARE EDUCATION/TRAINING PROGRAM
Payer: MEDICARE

## 2023-01-23 ENCOUNTER — NON-APPOINTMENT (OUTPATIENT)
Age: 78
End: 2023-01-23

## 2023-01-23 VITALS
SYSTOLIC BLOOD PRESSURE: 143 MMHG | DIASTOLIC BLOOD PRESSURE: 51 MMHG | HEART RATE: 62 BPM | WEIGHT: 179.02 LBS | OXYGEN SATURATION: 98 % | HEIGHT: 66 IN | TEMPERATURE: 98 F | RESPIRATION RATE: 16 BRPM

## 2023-01-23 VITALS
HEART RATE: 61 BPM | TEMPERATURE: 99 F | DIASTOLIC BLOOD PRESSURE: 70 MMHG | RESPIRATION RATE: 16 BRPM | OXYGEN SATURATION: 99 % | SYSTOLIC BLOOD PRESSURE: 153 MMHG

## 2023-01-23 DIAGNOSIS — H91.91 UNSPECIFIED HEARING LOSS, RIGHT EAR: ICD-10-CM

## 2023-01-23 DIAGNOSIS — Z88.0 ALLERGY STATUS TO PENICILLIN: ICD-10-CM

## 2023-01-23 DIAGNOSIS — G44.209 TENSION-TYPE HEADACHE, UNSPECIFIED, NOT INTRACTABLE: ICD-10-CM

## 2023-01-23 DIAGNOSIS — J45.909 UNSPECIFIED ASTHMA, UNCOMPLICATED: ICD-10-CM

## 2023-01-23 DIAGNOSIS — N40.0 BENIGN PROSTATIC HYPERPLASIA WITHOUT LOWER URINARY TRACT SYMPTOMS: ICD-10-CM

## 2023-01-23 DIAGNOSIS — I10 ESSENTIAL (PRIMARY) HYPERTENSION: ICD-10-CM

## 2023-01-23 DIAGNOSIS — Z98.890 OTHER SPECIFIED POSTPROCEDURAL STATES: Chronic | ICD-10-CM

## 2023-01-23 DIAGNOSIS — R51.9 HEADACHE, UNSPECIFIED: ICD-10-CM

## 2023-01-23 DIAGNOSIS — Z88.2 ALLERGY STATUS TO SULFONAMIDES: ICD-10-CM

## 2023-01-23 DIAGNOSIS — Z99.89 DEPENDENCE ON OTHER ENABLING MACHINES AND DEVICES: ICD-10-CM

## 2023-01-23 DIAGNOSIS — G47.33 OBSTRUCTIVE SLEEP APNEA (ADULT) (PEDIATRIC): ICD-10-CM

## 2023-01-23 DIAGNOSIS — Z85.841 PERSONAL HISTORY OF MALIGNANT NEOPLASM OF BRAIN: ICD-10-CM

## 2023-01-23 DIAGNOSIS — Z95.0 PRESENCE OF CARDIAC PACEMAKER: Chronic | ICD-10-CM

## 2023-01-23 DIAGNOSIS — I48.91 UNSPECIFIED ATRIAL FIBRILLATION: ICD-10-CM

## 2023-01-23 DIAGNOSIS — Z92.21 PERSONAL HISTORY OF ANTINEOPLASTIC CHEMOTHERAPY: ICD-10-CM

## 2023-01-23 DIAGNOSIS — Z79.01 LONG TERM (CURRENT) USE OF ANTICOAGULANTS: ICD-10-CM

## 2023-01-23 DIAGNOSIS — Z95.0 PRESENCE OF CARDIAC PACEMAKER: ICD-10-CM

## 2023-01-23 LAB
ALBUMIN SERPL ELPH-MCNC: 4.8 G/DL — SIGNIFICANT CHANGE UP (ref 3.3–5)
ALP SERPL-CCNC: 78 U/L — SIGNIFICANT CHANGE UP (ref 40–120)
ALT FLD-CCNC: 32 U/L — SIGNIFICANT CHANGE UP (ref 10–45)
ANION GAP SERPL CALC-SCNC: 10 MMOL/L — SIGNIFICANT CHANGE UP (ref 5–17)
APTT BLD: 47.4 SEC — HIGH (ref 27.5–35.5)
AST SERPL-CCNC: 24 U/L — SIGNIFICANT CHANGE UP (ref 10–40)
BASOPHILS # BLD AUTO: 0.02 K/UL — SIGNIFICANT CHANGE UP (ref 0–0.2)
BASOPHILS NFR BLD AUTO: 0.3 % — SIGNIFICANT CHANGE UP (ref 0–2)
BILIRUB SERPL-MCNC: 0.7 MG/DL — SIGNIFICANT CHANGE UP (ref 0.2–1.2)
BUN SERPL-MCNC: 13 MG/DL — SIGNIFICANT CHANGE UP (ref 7–23)
CALCIUM SERPL-MCNC: 9.4 MG/DL — SIGNIFICANT CHANGE UP (ref 8.4–10.5)
CHLORIDE SERPL-SCNC: 108 MMOL/L — SIGNIFICANT CHANGE UP (ref 96–108)
CO2 SERPL-SCNC: 25 MMOL/L — SIGNIFICANT CHANGE UP (ref 22–31)
CREAT SERPL-MCNC: 0.78 MG/DL — SIGNIFICANT CHANGE UP (ref 0.5–1.3)
EGFR: 91 ML/MIN/1.73M2 — SIGNIFICANT CHANGE UP
EOSINOPHIL # BLD AUTO: 0.02 K/UL — SIGNIFICANT CHANGE UP (ref 0–0.5)
EOSINOPHIL NFR BLD AUTO: 0.3 % — SIGNIFICANT CHANGE UP (ref 0–6)
GLUCOSE SERPL-MCNC: 114 MG/DL — HIGH (ref 70–99)
HCT VFR BLD CALC: 40.3 % — SIGNIFICANT CHANGE UP (ref 39–50)
HGB BLD-MCNC: 13.3 G/DL — SIGNIFICANT CHANGE UP (ref 13–17)
IMM GRANULOCYTES NFR BLD AUTO: 0.3 % — SIGNIFICANT CHANGE UP (ref 0–0.9)
INR BLD: 2.15 — HIGH (ref 0.88–1.16)
LYMPHOCYTES # BLD AUTO: 1.59 K/UL — SIGNIFICANT CHANGE UP (ref 1–3.3)
LYMPHOCYTES # BLD AUTO: 21.1 % — SIGNIFICANT CHANGE UP (ref 13–44)
MAGNESIUM SERPL-MCNC: 2.3 MG/DL — SIGNIFICANT CHANGE UP (ref 1.6–2.6)
MCHC RBC-ENTMCNC: 33 GM/DL — SIGNIFICANT CHANGE UP (ref 32–36)
MCHC RBC-ENTMCNC: 34.4 PG — HIGH (ref 27–34)
MCV RBC AUTO: 104.1 FL — HIGH (ref 80–100)
MONOCYTES # BLD AUTO: 0.44 K/UL — SIGNIFICANT CHANGE UP (ref 0–0.9)
MONOCYTES NFR BLD AUTO: 5.8 % — SIGNIFICANT CHANGE UP (ref 2–14)
NEUTROPHILS # BLD AUTO: 5.44 K/UL — SIGNIFICANT CHANGE UP (ref 1.8–7.4)
NEUTROPHILS NFR BLD AUTO: 72.2 % — SIGNIFICANT CHANGE UP (ref 43–77)
NRBC # BLD: 0 /100 WBCS — SIGNIFICANT CHANGE UP (ref 0–0)
PHOSPHATE SERPL-MCNC: 3.1 MG/DL — SIGNIFICANT CHANGE UP (ref 2.5–4.5)
PLATELET # BLD AUTO: 57 K/UL — LOW (ref 150–400)
POTASSIUM SERPL-MCNC: 4.7 MMOL/L — SIGNIFICANT CHANGE UP (ref 3.5–5.3)
POTASSIUM SERPL-SCNC: 4.7 MMOL/L — SIGNIFICANT CHANGE UP (ref 3.5–5.3)
PROT SERPL-MCNC: 7.2 G/DL — SIGNIFICANT CHANGE UP (ref 6–8.3)
PROTHROM AB SERPL-ACNC: 25.8 SEC — HIGH (ref 10.5–13.4)
RBC # BLD: 3.87 M/UL — LOW (ref 4.2–5.8)
RBC # FLD: 14.5 % — SIGNIFICANT CHANGE UP (ref 10.3–14.5)
SODIUM SERPL-SCNC: 143 MMOL/L — SIGNIFICANT CHANGE UP (ref 135–145)
WBC # BLD: 7.53 K/UL — SIGNIFICANT CHANGE UP (ref 3.8–10.5)
WBC # FLD AUTO: 7.53 K/UL — SIGNIFICANT CHANGE UP (ref 3.8–10.5)

## 2023-01-23 PROCEDURE — 99284 EMERGENCY DEPT VISIT MOD MDM: CPT

## 2023-01-23 PROCEDURE — 85025 COMPLETE CBC W/AUTO DIFF WBC: CPT

## 2023-01-23 PROCEDURE — 85610 PROTHROMBIN TIME: CPT

## 2023-01-23 PROCEDURE — 70481 CT ORBIT/EAR/FOSSA W/DYE: CPT | Mod: MA

## 2023-01-23 PROCEDURE — 84100 ASSAY OF PHOSPHORUS: CPT

## 2023-01-23 PROCEDURE — 80053 COMPREHEN METABOLIC PANEL: CPT

## 2023-01-23 PROCEDURE — 36415 COLL VENOUS BLD VENIPUNCTURE: CPT

## 2023-01-23 PROCEDURE — 85730 THROMBOPLASTIN TIME PARTIAL: CPT

## 2023-01-23 PROCEDURE — 70481 CT ORBIT/EAR/FOSSA W/DYE: CPT | Mod: 26,MA,59

## 2023-01-23 PROCEDURE — 99284 EMERGENCY DEPT VISIT MOD MDM: CPT | Mod: 25

## 2023-01-23 PROCEDURE — 70450 CT HEAD/BRAIN W/O DYE: CPT | Mod: 26,MA

## 2023-01-23 PROCEDURE — 83735 ASSAY OF MAGNESIUM: CPT

## 2023-01-23 PROCEDURE — 70450 CT HEAD/BRAIN W/O DYE: CPT | Mod: MA

## 2023-01-23 RX ORDER — ACETAMINOPHEN 500 MG
650 TABLET ORAL ONCE
Refills: 0 | Status: COMPLETED | OUTPATIENT
Start: 2023-01-23 | End: 2023-01-23

## 2023-01-23 RX ADMIN — Medication 650 MILLIGRAM(S): at 15:45

## 2023-01-23 NOTE — ED PROVIDER NOTE - PHYSICAL EXAMINATION
NEURO: pupils 3 mm, PERRL, EOMI (CN III, IV, VI), facial sensation intact to light touch in all 3 divisions bilat (CN V), face is symmetric with normal eye closure, eye opening, and smile (CN VII), hearing is normal to rubbing fingers (CN VII), palate elevates symmetrically, phonation is normal (CN IX, X),  shoulder shrug intact bilat (CN XI), tongue is midline with nl movements and no atrophy (CN XII), finger to nose test nl bilat, negative pronator drift bilat,, speech is clear; 5/5 motor strength BUE and BLE: deltoids, biceps, triceps, wrist flexors/extensors, hand , hip flexors, knee flexors/extensors, plantar/dorsiflexors, hallux flexors/extensors; sensation intact to light touch BUE and BLE: C5-T1 and L3-S1 gait wnl NEURO: pupils 3 mm, PERRL, EOMI (CN III, IV, VI), facial sensation intact to light touch in all 3 divisions bilat (CN V), face is symmetric with normal eye closure, eye opening, and smile (CN VII), hearing is DIMINISHED to rubbing fingers in L > R (CN VII), palate elevates symmetrically, phonation is normal (CN IX, X),  shoulder shrug intact bilat (CN XI), tongue is midline with nl movements and no atrophy (CN XII), finger to nose test nl bilat, negative pronator drift bilat,, speech is clear; 5/5 motor strength BUE and BLE: deltoids, biceps, triceps, wrist flexors/extensors, hand , hip flexors, knee flexors/extensors, plantar/dorsiflexors, hallux flexors/extensors; sensation intact to light touch BUE and BLE: C5-T1 and L3-S1 gait wnl    TMs with normal light reflex bilat no fb

## 2023-01-23 NOTE — ED PROVIDER NOTE - PROGRESS NOTE DETAILS
MD Silvio: CTH wnl,   CT IAC IMPRESSION: Relatively stable appearance to the right glomus jugulare   when accounting for differences in angulation and technique.  pt with non-focal neuro exam well appearing will dc with neurology and neurosurgery followup

## 2023-01-23 NOTE — ED PROVIDER NOTE - OBJECTIVE STATEMENT
77M c PMH of [place] (deaf in R ear), HTN, ?CAD, PFO (s/p repair @ 33yo), afib (on xarelto), high degree AVB s/p PPM, ADRIEL (no CPAP), asthma, R glomus jugulare brain tumor (benign, diagnosed 2017, s/p radiation) presents with 5 day hx of "sensation" that started in his mid-parietal region that moved to his R occipital region. Denies headache/f/c/vision changes/ataxia/facial droop/focal numbness/weakness. states last CTH in June showed stable tumor. denies head trauma. 77M c PMH of [place] (deaf in R ear), HTN, ?CAD, PFO (s/p repair @ 33yo), afib (on xarelto), high degree AVB s/p PPM, ADRIEL (no CPAP), asthma, R glomus jugulare brain tumor (benign, diagnosed 2017, s/p radiation) presents with 5 day hx of "sensation" that started in his mid-parietal region that moved to his R occipital region. Denies headache/f/c/vision changes/ataxia/facial droop/focal numbness/weakness. states last CTH in June showed stable tumor. denies head trauma. denies tinnitus or worsening hearing loss in L ear.

## 2023-01-23 NOTE — ED ADULT TRIAGE NOTE - CHIEF COMPLAINT QUOTE
Pt c/o occipital headache that has since moved to the top of his head x3 days. Pt has hx of benign brain tumor x6 years. Denies changes in vision, dizziness, fevers, vomiting. Ambulating with steady gait, neuro intact.

## 2023-01-23 NOTE — ED ADULT NURSE NOTE - OBJECTIVE STATEMENT
patient presents to ED with headache, denies n/v/d, abdominal pain, hx of benign brain tumor for 6years. denies dizziness, cp, sob. A&OX4 stable at bed.

## 2023-01-23 NOTE — ED PROVIDER NOTE - PATIENT PORTAL LINK FT
You can access the FollowMyHealth Patient Portal offered by [facility] by registering at the following website: [email or web address]. By joining [employer]’s FollowMyHealth portal, you will also be able to view your health information using other applications (apps) compatible with our system.

## 2023-01-23 NOTE — ED PROVIDER NOTE - NSFOLLOWUPINSTRUCTIONS_ED_ALL_ED_FT
Please reach out to Joseline Velazco ([facility] ED clinical referral coordinator) to assist you with your follow-up appointment with neurology and neurosurgery.    Monday - Friday 11am-7pm  [phone]  [email or web address]     1. You were seen for headache. A copy of any of your resulted labs, imaging, and findings have been provided to you. Make sure to view any test results that may not have yet resulted at the time of your discharge by creating a FollowMyHealth account at: [email or web address] to sign up for FollowMyHealth. Please review all of your lab, imaging, and all other results in their entirety with your primary care doctor.   2. Continue to take your home medications as prescribed.   3. Follow up with a neurologist and neurosurgeon and your primary care doctor within 48 hours to assess the symptoms you were seen for in the emergency department and to review all results from your visit. If you don't have a doctor, call [phone] to make an appointment.  4. Return immediately to the emergency department for new, persistent, or worsening symptoms or signs. Return immediately to the emergency department if you have chest pain, shortness of breath, loss of consciousness, vision changes, vomiting, fever, facial droop, slurred speech, or inability to move or feel your arms or legs.  5. For your for health, you should make healthy food choices and be physically active. Also, you should not smoke or use drugs, and you should not drink alcohol in excess. Please visit [email or web address] for resources and more information.

## 2023-01-23 NOTE — ED PROVIDER NOTE - CLINICAL SUMMARY MEDICAL DECISION MAKING FREE TEXT BOX
77M c PMH of [place] (deaf in R ear), HTN, ?CAD, PFO (s/p repair @ 31yo), afib (on xarelto), high degree AVB s/p PPM, ADRIEL (no CPAP), asthma, R glomus jugulare brain tumor (benign, diagnosed 2017, s/p radiation) presents with 5 day hx of "sensation" that started in his mid-parietal region that moved to his R occipital region. On exam, VS wnl, non-focal neuro exam no remk 77M c PMH of [place] (deaf in R ear), HTN, ?CAD, PFO (s/p repair @ 31yo), afib (on xarelto), high degree AVB s/p PPM, ADRIEL (no CPAP), asthma, R glomus jugulare brain tumor (benign, diagnosed 2017, s/p radiation) presents with 5 day hx of "sensation" that started in his mid-parietal region that moved to his R occipital region. On exam, VS wnl, non-focal neuro exam no remarkable exam findings.    ddx: brain tumor vs tension headache vs migraine    Plan:  - labs  - CTH, CT max face and CT IAC  - tylenol  - CT from June 2022 based on review of prior hospital chart records:   Stable appearance of right glomus jugulare tumor comparing back to May   2019.  - reassess

## 2023-01-23 NOTE — ED ADULT TRIAGE NOTE - ESI TRIAGE ACUITY LEVEL, MLM
Quality 47: Advance Care Plan: Advance Care Planning discussed and documented; advance care plan or surrogate decision maker documented in the medical record. Quality 226: Preventive Care And Screening: Tobacco Use: Screening And Cessation Intervention: Patient screened for tobacco use and is an ex/non-smoker Quality 111:Pneumonia Vaccination Status For Older Adults: Pneumococcal vaccine administered on or after patient’s 60th birthday and before the end of the measurement period Detail Level: Detailed Quality 130: Documentation Of Current Medications In The Medical Record: Current Medications Documented Quality 431: Preventive Care And Screening: Unhealthy Alcohol Use - Screening: Patient not identified as an unhealthy alcohol user when screened for unhealthy alcohol use using a systematic screening method 2

## 2023-01-26 ENCOUNTER — NON-APPOINTMENT (OUTPATIENT)
Age: 78
End: 2023-01-26

## 2023-02-02 ENCOUNTER — APPOINTMENT (OUTPATIENT)
Dept: OTOLARYNGOLOGY | Facility: CLINIC | Age: 78
End: 2023-02-02
Payer: MEDICARE

## 2023-02-02 VITALS
OXYGEN SATURATION: 98 % | RESPIRATION RATE: 17 BRPM | DIASTOLIC BLOOD PRESSURE: 67 MMHG | TEMPERATURE: 97.7 F | SYSTOLIC BLOOD PRESSURE: 146 MMHG | HEART RATE: 64 BPM

## 2023-02-02 DIAGNOSIS — R51.9 HEADACHE, UNSPECIFIED: ICD-10-CM

## 2023-02-02 DIAGNOSIS — H61.21 IMPACTED CERUMEN, RIGHT EAR: ICD-10-CM

## 2023-02-02 PROCEDURE — 99213 OFFICE O/P EST LOW 20 MIN: CPT

## 2023-02-02 NOTE — HISTORY OF PRESENT ILLNESS
[de-identified] : 3.5 month followup visit for this 76 y/o M here with his wife with h/o large glomus jugulare tumor s/p xrt. He is c/o headaches and intermittent "shock like sensation" on top of scalp. He went [facility] ED on 1/23 - he said he was getting headaches and had a CT head which revealed stable glomus jugulare tumor. He is still.c/o the intermittent "shock-like" sensations.

## 2023-02-02 NOTE — ASSESSMENT
[FreeTextEntry1] : I put peroxide in r eac to soak because he could not tolerate removal with Gunn needle or suction - I had explained this appeared to be hard wax and hard, dried blood and he agreed with the plan. I went to another room for approx 15 minutes to allow time for it to soak in- PA came over to tell me patient was sitting back up. I went back to his room and he told me I left him with his head "upside down" for 30 minutes and no one had ever treated him like that before. I explained the chair was flat; he was not upside down - so the drops could soak in and it was for 15 min. I asked if I could now attempt to remove the material from his ear. He said I could not and I that could not lay the chair down so I could use the microscope again, but I could look at it with him sitting up. The material looked softened. I asked if I could attempt to suction it out and he said that I could not, that he had another md he could go to take this out in 1 minute and left. Prior to this, PA and I had recommended neuro referral to him and his wife and soft diet and to see his dentist for tmj. I wished him my best and told him I would be available to take care of him if he changes his mind as he was walking out of the office and did not wish to reschedule.

## 2023-02-02 NOTE — PHYSICAL EXAM
[Normal] : no nystagmus [de-identified] : b TMJ r>l [de-identified] : l nl, r dry blood hard cerumen in inferior eac- I attempted to remove under microsocpe - he could not tolerate gently touching it with Gunn needle. Explained to pt and soaked in peroxide to soften. [de-identified] : gait steady

## 2023-02-02 NOTE — DATA REVIEWED
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

## 2023-02-22 ENCOUNTER — APPOINTMENT (OUTPATIENT)
Dept: OPHTHALMOLOGY | Facility: CLINIC | Age: 78
End: 2023-02-22
Payer: MEDICARE

## 2023-02-22 ENCOUNTER — NON-APPOINTMENT (OUTPATIENT)
Age: 78
End: 2023-02-22

## 2023-02-22 PROCEDURE — 92083 EXTENDED VISUAL FIELD XM: CPT

## 2023-02-22 PROCEDURE — 92014 COMPRE OPH EXAM EST PT 1/>: CPT

## 2023-02-22 PROCEDURE — 92133 CPTRZD OPH DX IMG PST SGM ON: CPT

## 2023-03-13 ENCOUNTER — APPOINTMENT (OUTPATIENT)
Dept: HEART AND VASCULAR | Facility: CLINIC | Age: 78
End: 2023-03-13
Payer: MEDICARE

## 2023-03-13 ENCOUNTER — NON-APPOINTMENT (OUTPATIENT)
Age: 78
End: 2023-03-13

## 2023-03-13 VITALS
WEIGHT: 175 LBS | HEIGHT: 66 IN | BODY MASS INDEX: 28.12 KG/M2 | SYSTOLIC BLOOD PRESSURE: 149 MMHG | HEART RATE: 67 BPM | DIASTOLIC BLOOD PRESSURE: 67 MMHG

## 2023-03-13 PROCEDURE — 93280 PM DEVICE PROGR EVAL DUAL: CPT

## 2023-03-14 NOTE — DISCUSSION/SUMMARY
[FreeTextEntry1] : Mr. Vick is a 77 year-old male with permanent atrial fibrillation s/p SJM single chamber pacemaker in the setting of complete heart block.  His device is functioning properly with a 98% ventricular pacing.  He will follow-up with Dr. Sidhu for general cardiology care and repeat ECHO. He remains in permanent atrial fibrillation and is on Warfarin for TE/CVA prophylaxis.  Advised to return for device check in six months or sooner as needed.

## 2023-03-14 NOTE — ADDENDUM
[FreeTextEntry1] : I, Reno Sosa, hereby attest that the medical record entry for this patient accurately reflects signatures/notations that I made on the Date of Service in my capacity as an Attending Physician when I treated/diagnosed the above patient. I do hereby attest that this information is true, accurate and complete to the best of my knowledge.  I was present for the entire visit and supervised the entire visit and made/agree with the plan as outlined.\par

## 2023-06-28 ENCOUNTER — NON-APPOINTMENT (OUTPATIENT)
Age: 78
End: 2023-06-28

## 2023-06-28 ENCOUNTER — APPOINTMENT (OUTPATIENT)
Dept: OPHTHALMOLOGY | Facility: CLINIC | Age: 78
End: 2023-06-28
Payer: MEDICARE

## 2023-06-28 PROCEDURE — 92014 COMPRE OPH EXAM EST PT 1/>: CPT

## 2023-07-27 ENCOUNTER — NON-APPOINTMENT (OUTPATIENT)
Age: 78
End: 2023-07-27

## 2023-08-17 ENCOUNTER — APPOINTMENT (OUTPATIENT)
Dept: HEMATOLOGY ONCOLOGY | Facility: CLINIC | Age: 78
End: 2023-08-17
Payer: MEDICARE

## 2023-08-17 VITALS
WEIGHT: 174 LBS | SYSTOLIC BLOOD PRESSURE: 122 MMHG | TEMPERATURE: 98.4 F | HEIGHT: 66 IN | BODY MASS INDEX: 27.97 KG/M2 | DIASTOLIC BLOOD PRESSURE: 58 MMHG | OXYGEN SATURATION: 97 % | HEART RATE: 75 BPM

## 2023-08-17 LAB
APTT BLD: 34.9 SEC
ERYTHROCYTE [SEDIMENTATION RATE] IN BLOOD BY WESTERGREN METHOD: 11 MM/HR
INR PPP: 1.19
PT BLD: 13.5 SEC
RBC # BLD: 4.1 M/UL
RETICS # AUTO: 1.8 %
RETICS AGGREG/RBC NFR: 73.4 K/UL

## 2023-08-17 PROCEDURE — 99214 OFFICE O/P EST MOD 30 MIN: CPT | Mod: 25

## 2023-08-17 PROCEDURE — 36415 COLL VENOUS BLD VENIPUNCTURE: CPT

## 2023-08-17 RX ORDER — OFLOXACIN OTIC 3 MG/ML
0.3 SOLUTION AURICULAR (OTIC)
Qty: 1 | Refills: 1 | Status: DISCONTINUED | COMMUNITY
Start: 2022-09-13 | End: 2023-08-17

## 2023-08-17 RX ORDER — NYSTATIN 100000 [USP'U]/ML
100000 SUSPENSION ORAL
Qty: 400 | Refills: 0 | Status: DISCONTINUED | COMMUNITY
Start: 2021-07-29 | End: 2023-08-17

## 2023-08-17 RX ORDER — ALBUTEROL 90 MCG
90 AEROSOL (GRAM) INHALATION
Refills: 0 | Status: ACTIVE | COMMUNITY

## 2023-08-17 RX ORDER — WARFARIN SODIUM 6 MG/1
TABLET ORAL
Refills: 0 | Status: DISCONTINUED | COMMUNITY
End: 2023-08-17

## 2023-08-17 NOTE — REASON FOR VISIT
[Follow-Up Visit] : a follow-up visit for [FreeTextEntry2] : Immune thrombocytopenia, macrocytosis, large granular lymphocytosis, positive NORMA, easy bruising, fatigue

## 2023-08-17 NOTE — HISTORY OF PRESENT ILLNESS
[de-identified] : Patient is a 77 year old male with a history of multiple medical problems including cardiac arrhythmia, neuropathy, IBS, vertigo, reflux who now presents for hematologic follow-up for immune thrombocytopenia, fatigue and macrocytosis. Patient was last seen in this office in November 2021. Blood results at that time revealed the platelet count was low but relatively stable at 68,000. Hemoglobin, hematocrit and WBC were normal. MCV remained elevated at 104.8. The sed rate was 21. B12, folate, iron studies were normal. LDH and haptoglobin were normal. NORMA was positive at 1:320. Patient has seen a rheumatologist and was recently diagnosed with Lupus. He is presently being treated with Hydroxychloroquine and tapering dose of Prednisone for his rheumatologic complaints. Of note patient is no longer on Warfarin and now takes Xarelto for his atrial fibrillation.   Today, the patient complains of chronic loss of hearing in the right ear, shortness of breath of exertion for which he uses his inhaler, hip pain and joint pain in hands. Patient states that the arthritic pains in his hands have improved with the use of steroids. He also experiences brain fog. Denies bleeding from the nose or mouth, hematochezia, hematuria, current chest pain, palpitations, fever, sweats, chills, recent infections or unintentional weight loss.

## 2023-08-17 NOTE — PHYSICAL EXAM
[Normal] : affect appropriate [de-identified] : ? ventral hernia [de-identified] : pacemaker left anterior chest wall, RRR. S1, S2, murmur  [de-identified] : small ecchymoses  both upper extremities

## 2023-08-17 NOTE — REVIEW OF SYSTEMS
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  Better since on steroids/hydrchloroquine [de-identified] : purpuric areas upper extremities [de-identified] : Reports "brain fog"

## 2023-08-17 NOTE — ASSESSMENT
[FreeTextEntry1] : Patient is a 77 year old male with a history of multiple medical problems who now presents for hematologic follow-up for immune thrombocytopenia, fatigue, macrocytosis and large granular lymphocytosis. Patient has had a positive NORMA for some time and was seen recently by rheumatologist who confirmed that the patient has lupus.  He was started on hydroxychloroquine and prednisone with improvement in his rheumatologic complaints.  Have ordered Activated Partial Thromboplastin Time, B12 and folate, CBC with differential, CMP, ferritin, haptoglobin, iron panel, LDH, manual differential, Prothrombin Time and INR plasma, reticulocyte count and sed rate. Venipuncture was performed in the office today. Patient was advised to call office to discuss results and next appointment date.

## 2023-08-18 LAB
ALBUMIN SERPL ELPH-MCNC: 4.8 G/DL
ALP BLD-CCNC: 68 U/L
ALT SERPL-CCNC: 42 U/L
ANION GAP SERPL CALC-SCNC: 13 MMOL/L
ANISOCYTOSIS BLD QL: SLIGHT
AST SERPL-CCNC: 18 U/L
BASOPHILS # BLD AUTO: 0 K/UL
BASOPHILS NFR BLD AUTO: 0 %
BILIRUB SERPL-MCNC: 0.9 MG/DL
BUN SERPL-MCNC: 21 MG/DL
CALCIUM SERPL-MCNC: 10.2 MG/DL
CHLORIDE SERPL-SCNC: 102 MMOL/L
CO2 SERPL-SCNC: 26 MMOL/L
CREAT SERPL-MCNC: 0.92 MG/DL
DACRYOCYTES BLD QL SMEAR: SLIGHT
EGFR: 85 ML/MIN/1.73M2
EOSINOPHIL # BLD AUTO: 0 K/UL
EOSINOPHIL NFR BLD AUTO: 0 %
FERRITIN SERPL-MCNC: 172 NG/ML
FOLATE SERPL-MCNC: 10 NG/ML
GLUCOSE SERPL-MCNC: 87 MG/DL
HAPTOGLOB SERPL-MCNC: 100 MG/DL
HYPOCHROMIA BLD QL SMEAR: SLIGHT
IRON SATN MFR SERPL: 34 %
IRON SERPL-MCNC: 100 UG/DL
LDH SERPL-CCNC: 204 U/L
LYMPHOCYTES # BLD AUTO: 2.11 K/UL
LYMPHOCYTES NFR BLD AUTO: 17.7 %
MACROCYTES BLD QL SMEAR: SLIGHT
MONOCYTES # BLD AUTO: 0.85 K/UL
MONOCYTES NFR BLD AUTO: 7.1 %
MSMEAR: NORMAL
NEUTROPHILS # BLD AUTO: 8.96 K/UL
NEUTROPHILS NFR BLD AUTO: 75.2 %
OVALOCYTES BLD QL SMEAR: SLIGHT
PLAT MORPH BLD: ABNORMAL
POIKILOCYTOSIS BLD QL SMEAR: SLIGHT
POLYCHROMASIA BLD QL SMEAR: SLIGHT
POTASSIUM SERPL-SCNC: 4.6 MMOL/L
PROT SERPL-MCNC: 7.5 G/DL
RBC BLD AUTO: ABNORMAL
SCHISTOCYTES BLD QL SMEAR: SLIGHT
SODIUM SERPL-SCNC: 141 MMOL/L
TIBC SERPL-MCNC: 294 UG/DL
UIBC SERPL-MCNC: 195 UG/DL
VIT B12 SERPL-MCNC: 922 PG/ML

## 2023-10-11 ENCOUNTER — NON-APPOINTMENT (OUTPATIENT)
Age: 78
End: 2023-10-11

## 2023-10-11 ENCOUNTER — APPOINTMENT (OUTPATIENT)
Dept: OPHTHALMOLOGY | Facility: CLINIC | Age: 78
End: 2023-10-11
Payer: MEDICARE

## 2023-10-11 PROCEDURE — 92083 EXTENDED VISUAL FIELD XM: CPT

## 2023-10-11 PROCEDURE — 92012 INTRM OPH EXAM EST PATIENT: CPT

## 2023-10-11 PROCEDURE — 92134 CPTRZ OPH DX IMG PST SGM RTA: CPT

## 2023-11-22 ENCOUNTER — APPOINTMENT (OUTPATIENT)
Dept: OTOLARYNGOLOGY | Facility: CLINIC | Age: 78
End: 2023-11-22
Payer: MEDICARE

## 2023-11-22 ENCOUNTER — APPOINTMENT (OUTPATIENT)
Dept: HEMATOLOGY ONCOLOGY | Facility: CLINIC | Age: 78
End: 2023-11-22
Payer: MEDICARE

## 2023-11-22 VITALS
WEIGHT: 174 LBS | HEIGHT: 66 IN | OXYGEN SATURATION: 96 % | SYSTOLIC BLOOD PRESSURE: 122 MMHG | BODY MASS INDEX: 27.97 KG/M2 | TEMPERATURE: 96.7 F | DIASTOLIC BLOOD PRESSURE: 64 MMHG | HEART RATE: 66 BPM

## 2023-11-22 VITALS
BODY MASS INDEX: 27.97 KG/M2 | OXYGEN SATURATION: 95 % | HEART RATE: 71 BPM | DIASTOLIC BLOOD PRESSURE: 69 MMHG | HEIGHT: 66 IN | SYSTOLIC BLOOD PRESSURE: 113 MMHG | WEIGHT: 174 LBS

## 2023-11-22 DIAGNOSIS — R42 DIZZINESS AND GIDDINESS: ICD-10-CM

## 2023-11-22 DIAGNOSIS — D44.7 NEOPLASM OF UNCERTAIN BEHAVIOR OF AORTIC BODY AND OTHER PARAGANGLIA: ICD-10-CM

## 2023-11-22 DIAGNOSIS — D72.820 LYMPHOCYTOSIS (SYMPTOMATIC): ICD-10-CM

## 2023-11-22 PROCEDURE — 99213 OFFICE O/P EST LOW 20 MIN: CPT

## 2023-11-22 PROCEDURE — 36415 COLL VENOUS BLD VENIPUNCTURE: CPT

## 2023-11-22 PROCEDURE — 99214 OFFICE O/P EST MOD 30 MIN: CPT | Mod: 25

## 2023-11-22 RX ORDER — CYANOCOBALAMIN (VITAMIN B-12) 1000 MCG
1000 TABLET ORAL
Refills: 0 | Status: ACTIVE | COMMUNITY

## 2023-11-22 RX ORDER — TIOTROPIUM BROMIDE AND OLODATEROL 3.124; 2.736 UG/1; UG/1
2.5-2.5 SPRAY, METERED RESPIRATORY (INHALATION)
Refills: 0 | Status: ACTIVE | COMMUNITY

## 2023-11-22 RX ORDER — BUDESONIDE AND FORMOTEROL FUMARATE DIHYDRATE 160; 4.5 UG/1; UG/1
160-4.5 AEROSOL RESPIRATORY (INHALATION)
Qty: 10 | Refills: 0 | Status: DISCONTINUED | COMMUNITY
Start: 2021-05-26 | End: 2023-11-22

## 2023-11-22 RX ORDER — DIGOXIN 250 UG/ML
0.25 INJECTION, SOLUTION INTRAMUSCULAR; INTRAVENOUS; PARENTERAL
Refills: 0 | Status: DISCONTINUED | COMMUNITY
End: 2023-11-22

## 2023-11-22 RX ORDER — UBIDECARENONE/VIT E ACET 100MG-5
1000 CAPSULE ORAL
Refills: 0 | Status: ACTIVE | COMMUNITY

## 2023-11-22 RX ORDER — PREDNISONE 10 MG
TABLET ORAL
Refills: 0 | Status: DISCONTINUED | COMMUNITY
End: 2023-11-22

## 2023-11-24 LAB
ALBUMIN SERPL ELPH-MCNC: 5 G/DL
ALP BLD-CCNC: 82 U/L
ALT SERPL-CCNC: 29 U/L
ANION GAP SERPL CALC-SCNC: 11 MMOL/L
ANISOCYTOSIS BLD QL: SLIGHT
APTT BLD: 38.3 SEC
AST SERPL-CCNC: 23 U/L
B2 MICROGLOB SERPL-MCNC: 2.3 MG/L
BASOPHILS # BLD AUTO: 0.05 K/UL
BASOPHILS # BLD AUTO: 0.05 K/UL
BASOPHILS NFR BLD AUTO: 0.9 %
BASOPHILS NFR BLD AUTO: 0.9 %
BILIRUB SERPL-MCNC: 0.9 MG/DL
BUN SERPL-MCNC: 13 MG/DL
BURR CELLS BLD QL SMEAR: PRESENT
CALCIUM SERPL-MCNC: 9.8 MG/DL
CHLORIDE SERPL-SCNC: 104 MMOL/L
CO2 SERPL-SCNC: 27 MMOL/L
CREAT SERPL-MCNC: 0.85 MG/DL
CRP SERPL-MCNC: <3 MG/L
DACRYOCYTES BLD QL SMEAR: SLIGHT
EGFR: 88 ML/MIN/1.73M2
EOSINOPHIL # BLD AUTO: 0 K/UL
EOSINOPHIL # BLD AUTO: 0 K/UL
EOSINOPHIL NFR BLD AUTO: 0 %
EOSINOPHIL NFR BLD AUTO: 0 %
ERYTHROCYTE [SEDIMENTATION RATE] IN BLOOD BY WESTERGREN METHOD: 10 MM/HR
FERRITIN SERPL-MCNC: 122 NG/ML
FOLATE SERPL-MCNC: 11.2 NG/ML
GIANT PLATELETS BLD QL SMEAR: PRESENT
GLUCOSE SERPL-MCNC: 93 MG/DL
HAPTOGLOB SERPL-MCNC: 71 MG/DL
HCT VFR BLD CALC: 42.2 %
HGB BLD-MCNC: 13.5 G/DL
INR PPP: 1.09
IRON SATN MFR SERPL: 27 %
IRON SERPL-MCNC: 84 UG/DL
LDH SERPL-CCNC: 235 U/L
LYMPHOCYTES # BLD AUTO: 1.12 K/UL
LYMPHOCYTES # BLD AUTO: 1.12 K/UL
LYMPHOCYTES NFR BLD AUTO: 19.5 %
LYMPHOCYTES NFR BLD AUTO: 19.5 %
MACROCYTES BLD QL SMEAR: SLIGHT
MAN DIFF?: NORMAL
MCHC RBC-ENTMCNC: 32 GM/DL
MCHC RBC-ENTMCNC: 33.6 PG
MCV RBC AUTO: 105 FL
MONOCYTES # BLD AUTO: 0.51 K/UL
MONOCYTES # BLD AUTO: 0.51 K/UL
MONOCYTES NFR BLD AUTO: 8.8 %
MONOCYTES NFR BLD AUTO: 8.8 %
MSMEAR: NORMAL
NEUTROPHILS # BLD AUTO: 4.06 K/UL
NEUTROPHILS # BLD AUTO: 4.06 K/UL
NEUTROPHILS NFR BLD AUTO: 70.8 %
NEUTROPHILS NFR BLD AUTO: 70.8 %
OVALOCYTES BLD QL SMEAR: NORMAL
PLAT MORPH BLD: ABNORMAL
PLATELET # BLD AUTO: 56 K/UL
POIKILOCYTOSIS BLD QL SMEAR: NORMAL
POLYCHROMASIA BLD QL SMEAR: SLIGHT
POTASSIUM SERPL-SCNC: 4.7 MMOL/L
PROT SERPL-MCNC: 7.4 G/DL
PT BLD: 12.4 SEC
RBC # BLD: 4.02 M/UL
RBC # BLD: 4.02 M/UL
RBC # FLD: 14.9 %
RBC BLD AUTO: ABNORMAL
RETICS # AUTO: 1.3 %
RETICS AGGREG/RBC NFR: 53.5 K/UL
SODIUM SERPL-SCNC: 142 MMOL/L
SPHEROCYTES BLD QL SMEAR: SLIGHT
TIBC SERPL-MCNC: 313 UG/DL
UIBC SERPL-MCNC: 229 UG/DL
URATE SERPL-MCNC: 5.2 MG/DL
VIT B12 SERPL-MCNC: 888 PG/ML
WBC # FLD AUTO: 5.74 K/UL

## 2023-11-30 ENCOUNTER — NON-APPOINTMENT (OUTPATIENT)
Age: 78
End: 2023-11-30

## 2023-11-30 DIAGNOSIS — M32.9 SYSTEMIC LUPUS ERYTHEMATOSUS, UNSPECIFIED: ICD-10-CM

## 2023-12-05 ENCOUNTER — RESULT REVIEW (OUTPATIENT)
Age: 78
End: 2023-12-05

## 2023-12-05 ENCOUNTER — APPOINTMENT (OUTPATIENT)
Dept: CT IMAGING | Facility: HOSPITAL | Age: 78
End: 2023-12-05
Payer: MEDICARE

## 2023-12-05 ENCOUNTER — OUTPATIENT (OUTPATIENT)
Dept: OUTPATIENT SERVICES | Facility: HOSPITAL | Age: 78
LOS: 1 days | End: 2023-12-05
Payer: MEDICARE

## 2023-12-05 DIAGNOSIS — Z98.890 OTHER SPECIFIED POSTPROCEDURAL STATES: Chronic | ICD-10-CM

## 2023-12-05 DIAGNOSIS — Z95.0 PRESENCE OF CARDIAC PACEMAKER: Chronic | ICD-10-CM

## 2023-12-05 LAB
POCT ISTAT CREATININE: 0.9 MG/DL — SIGNIFICANT CHANGE UP (ref 0.5–1.3)
POCT ISTAT CREATININE: 0.9 MG/DL — SIGNIFICANT CHANGE UP (ref 0.5–1.3)

## 2023-12-05 PROCEDURE — 70491 CT SOFT TISSUE NECK W/DYE: CPT | Mod: 26,MH

## 2023-12-05 PROCEDURE — 70481 CT ORBIT/EAR/FOSSA W/DYE: CPT | Mod: MH

## 2023-12-05 PROCEDURE — 70481 CT ORBIT/EAR/FOSSA W/DYE: CPT | Mod: 26,MH

## 2023-12-05 PROCEDURE — 70491 CT SOFT TISSUE NECK W/DYE: CPT | Mod: MH

## 2023-12-05 PROCEDURE — 82565 ASSAY OF CREATININE: CPT

## 2023-12-06 ENCOUNTER — APPOINTMENT (OUTPATIENT)
Dept: OTOLARYNGOLOGY | Facility: CLINIC | Age: 78
End: 2023-12-06

## 2023-12-07 ENCOUNTER — NON-APPOINTMENT (OUTPATIENT)
Age: 78
End: 2023-12-07

## 2023-12-12 ENCOUNTER — RESULT REVIEW (OUTPATIENT)
Age: 78
End: 2023-12-12

## 2023-12-12 ENCOUNTER — APPOINTMENT (OUTPATIENT)
Dept: INTERVENTIONAL RADIOLOGY/VASCULAR | Facility: HOSPITAL | Age: 78
End: 2023-12-12

## 2023-12-12 ENCOUNTER — OUTPATIENT (OUTPATIENT)
Dept: OUTPATIENT SERVICES | Facility: HOSPITAL | Age: 78
LOS: 1 days | End: 2023-12-12
Payer: MEDICARE

## 2023-12-12 DIAGNOSIS — Z95.0 PRESENCE OF CARDIAC PACEMAKER: Chronic | ICD-10-CM

## 2023-12-12 DIAGNOSIS — Z98.890 OTHER SPECIFIED POSTPROCEDURAL STATES: Chronic | ICD-10-CM

## 2023-12-12 PROCEDURE — 88341 IMHCHEM/IMCYTCHM EA ADD ANTB: CPT | Mod: 26,59

## 2023-12-12 PROCEDURE — 88342 IMHCHEM/IMCYTCHM 1ST ANTB: CPT | Mod: 26,59

## 2023-12-12 PROCEDURE — 88342 IMHCHEM/IMCYTCHM 1ST ANTB: CPT

## 2023-12-12 PROCEDURE — 88311 DECALCIFY TISSUE: CPT

## 2023-12-12 PROCEDURE — 77002 NEEDLE LOCALIZATION BY XRAY: CPT

## 2023-12-12 PROCEDURE — 85097 BONE MARROW INTERPRETATION: CPT

## 2023-12-12 PROCEDURE — 77002 NEEDLE LOCALIZATION BY XRAY: CPT | Mod: 26

## 2023-12-12 PROCEDURE — 88311 DECALCIFY TISSUE: CPT | Mod: 26

## 2023-12-12 PROCEDURE — 88189 FLOWCYTOMETRY/READ 16 & >: CPT

## 2023-12-12 PROCEDURE — 88305 TISSUE EXAM BY PATHOLOGIST: CPT | Mod: 26

## 2023-12-12 PROCEDURE — 88305 TISSUE EXAM BY PATHOLOGIST: CPT

## 2023-12-12 PROCEDURE — 88313 SPECIAL STAINS GROUP 2: CPT | Mod: 26

## 2023-12-12 PROCEDURE — 88360 TUMOR IMMUNOHISTOCHEM/MANUAL: CPT | Mod: 26

## 2023-12-12 PROCEDURE — 99152 MOD SED SAME PHYS/QHP 5/>YRS: CPT

## 2023-12-12 PROCEDURE — 88341 IMHCHEM/IMCYTCHM EA ADD ANTB: CPT

## 2023-12-12 PROCEDURE — 88313 SPECIAL STAINS GROUP 2: CPT

## 2023-12-12 PROCEDURE — 99153 MOD SED SAME PHYS/QHP EA: CPT

## 2023-12-12 PROCEDURE — 38222 DX BONE MARROW BX & ASPIR: CPT | Mod: LT

## 2023-12-12 PROCEDURE — C1830: CPT

## 2023-12-12 PROCEDURE — 38222 DX BONE MARROW BX & ASPIR: CPT

## 2023-12-14 DIAGNOSIS — D69.3 IMMUNE THROMBOCYTOPENIC PURPURA: ICD-10-CM

## 2023-12-26 LAB
HEMATOPATHOLOGY REPORT: SIGNIFICANT CHANGE UP
HEMATOPATHOLOGY REPORT: SIGNIFICANT CHANGE UP

## 2023-12-28 ENCOUNTER — NON-APPOINTMENT (OUTPATIENT)
Age: 78
End: 2023-12-28

## 2024-02-08 ENCOUNTER — NON-APPOINTMENT (OUTPATIENT)
Age: 79
End: 2024-02-08

## 2024-02-08 ENCOUNTER — APPOINTMENT (OUTPATIENT)
Dept: HEART AND VASCULAR | Facility: CLINIC | Age: 79
End: 2024-02-08
Payer: MEDICARE

## 2024-02-08 VITALS
BODY MASS INDEX: 28.28 KG/M2 | HEIGHT: 66 IN | HEART RATE: 67 BPM | SYSTOLIC BLOOD PRESSURE: 124 MMHG | DIASTOLIC BLOOD PRESSURE: 58 MMHG | WEIGHT: 176 LBS | TEMPERATURE: 97.5 F

## 2024-02-08 DIAGNOSIS — Z95.0 PRESENCE OF CARDIAC PACEMAKER: ICD-10-CM

## 2024-02-08 PROCEDURE — 93279 PRGRMG DEV EVAL PM/LDLS PM: CPT

## 2024-02-13 ENCOUNTER — APPOINTMENT (OUTPATIENT)
Dept: HEMATOLOGY ONCOLOGY | Facility: CLINIC | Age: 79
End: 2024-02-13

## 2024-02-28 PROBLEM — Z95.0 ARTIFICIAL CARDIAC PACEMAKER: Status: ACTIVE | Noted: 2021-07-12

## 2024-02-28 NOTE — DISCUSSION/SUMMARY
[FreeTextEntry1] : Mr. Vick is a 78 year-old male with permanent atrial fibrillation s/p SJM single chamber pacemaker in the setting of complete heart block.  His device is functioning properly with a 97% ventricular pacing.  He will follow-up with Dr. Sidhu for general cardiology care and repeat ECHO. He remains in permanent atrial fibrillation and is on Warfarin for TE/CVA prophylaxis.  Advised to return for device check in six months or sooner as needed.

## 2024-02-28 NOTE — HISTORY OF PRESENT ILLNESS
[de-identified] : Mr. Vick is a 78 year-old male with ADRIEL, HTN, BPH, ITP, macrocytosis, and chronic atrial fibrillation who underwent single chamber pacemaker implantation ([place]) in 2007 for high grade AV block.  He underwent generator replacement in 2017.  Prior care with Dr. Levine.  He reports feeling well and denies chest pain, discomfort over the device site, palpitations, SOB, MARTIN, LE edema, orthopnea, PND, and syncope.  He reports a stable exercise tolerance and compliance with his medication regimen.  He has discussed transitioning to a NOAC with Dr. Sidhu and the plan is to continue Warfarin at this point.  Newly diagnosed with myelodysplastic syndrome.  SEE PACEART  TTE 10/2019 showed LVEF 58%, basal septal hypertrophy, LA enlargement. No significant valvular disease.

## 2024-03-11 ENCOUNTER — APPOINTMENT (OUTPATIENT)
Dept: HEART AND VASCULAR | Facility: CLINIC | Age: 79
End: 2024-03-11

## 2024-03-13 ENCOUNTER — APPOINTMENT (OUTPATIENT)
Dept: OPHTHALMOLOGY | Facility: CLINIC | Age: 79
End: 2024-03-13
Payer: MEDICARE

## 2024-03-13 ENCOUNTER — NON-APPOINTMENT (OUTPATIENT)
Age: 79
End: 2024-03-13

## 2024-03-13 PROCEDURE — 92083 EXTENDED VISUAL FIELD XM: CPT

## 2024-03-13 PROCEDURE — 92250 FUNDUS PHOTOGRAPHY W/I&R: CPT

## 2024-03-13 PROCEDURE — 92020 GONIOSCOPY: CPT

## 2024-03-13 PROCEDURE — 92014 COMPRE OPH EXAM EST PT 1/>: CPT

## 2024-03-13 PROCEDURE — 76514 ECHO EXAM OF EYE THICKNESS: CPT

## 2024-03-25 ENCOUNTER — APPOINTMENT (OUTPATIENT)
Dept: HEMATOLOGY ONCOLOGY | Facility: CLINIC | Age: 79
End: 2024-03-25
Payer: MEDICARE

## 2024-03-25 VITALS
TEMPERATURE: 97.6 F | DIASTOLIC BLOOD PRESSURE: 68 MMHG | SYSTOLIC BLOOD PRESSURE: 142 MMHG | BODY MASS INDEX: 28.77 KG/M2 | HEIGHT: 66 IN | WEIGHT: 179 LBS | HEART RATE: 62 BPM | OXYGEN SATURATION: 97 %

## 2024-03-25 LAB
ERYTHROCYTE [SEDIMENTATION RATE] IN BLOOD BY WESTERGREN METHOD: 7 MM/HR
RBC # BLD: 3.95 M/UL
RETICS # AUTO: 1 %
RETICS AGGREG/RBC NFR: 39.5 K/UL

## 2024-03-25 PROCEDURE — 99214 OFFICE O/P EST MOD 30 MIN: CPT

## 2024-03-25 PROCEDURE — 36415 COLL VENOUS BLD VENIPUNCTURE: CPT

## 2024-03-25 PROCEDURE — G2211 COMPLEX E/M VISIT ADD ON: CPT

## 2024-03-25 NOTE — REVIEW OF SYSTEMS
[Fatigue] : fatigue [Joint Pain] : joint pain [Joint Stiffness] : joint stiffness [Easy Bruising] : a tendency for easy bruising [Negative] : Allergic/Immunologic [Fever] : no fever [Chills] : no chills [Night Sweats] : no night sweats [Recent Change In Weight] : ~T recent weight change [Vision Problems] : no vision problems [Nosebleeds] : no nosebleeds [Chest Pain] : no chest pain [Shortness Of Breath] : no shortness of breath [Abdominal Pain] : no abdominal pain [Skin Rash] : no skin rash [Easy Bleeding] : no tendency for easy bleeding [FreeTextEntry2] : Gained 5 pounds [FreeTextEntry9] : Left shoulder

## 2024-03-25 NOTE — PHYSICAL EXAM
[Normal] : affect appropriate [de-identified] : pacemaker left anterior chest wall, IRR. S1, S2, murmur  [de-identified] : ? ventral hernia [de-identified] : multiple ecchymoses on upper extremities and trunk,

## 2024-03-25 NOTE — HISTORY OF PRESENT ILLNESS
[de-identified] : Patient is a 78 year old male with a history of multiple medical problems including cardiac arrhythmia, neuropathy, IBS, vertigo, reflux, lupus who now presents for hematologic follow-up for immune thrombocytopenia, macrocytosis, MDS by recent marrow studies, easy bruising and  fatigue.  At the last visit in November 2023, the CBC was significant for a low platelet count of 56,000, normal white blood count, hemoglobin, and hematocrit. Comprehensive metabolic panel, B12, folate, iron panel, ferritin, LDH, haptoglobin, C-reactive protein and uric acid were normal. INR was normal, the PTT was slightly elevated to 38.3, likely due to patient taking Xarelto. Flow cytometry revealed no evidence of acute leukemia or lymphoma by cell marker analysis. Bone marrow biopsy was significant for normocellular marrow with trilineage hematopoiesis with dysmegakaryopoiesis.. No increase in blasts. 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  Patient states, as per his rheumatologist, he does not have lupus, but remains on Hydroxychloroquine. He remains on Xarelto for his atrial fibrillation. He has gained 5 pounds since the last visit. Today, the patient complains of joint pain in the left shoulder, seasonal allergies, excessive bruising and he feels fatigued. Patient continues to supplement vitamin B12 and D3. He remains active by exercise. Patient will be following up with Dr. Sidhu tomorrow. Denies bleeding from the nose or mouth, hematochezia, hematuria, current chest pain, palpitations, fever, drenching sweats, chills, recent infections or unintentional weight loss.

## 2024-03-25 NOTE — REASON FOR VISIT
[Follow-Up Visit] : a follow-up visit for [FreeTextEntry2] : Myelodysplasia, immune thrombocytopenia, easy bruising, fatigue, macrocytosis, positive NORMA

## 2024-03-25 NOTE — ASSESSMENT
[FreeTextEntry1] : Patient is a 78 year old male with a history of multiple medical problems who now presents for hematologic follow-up for Myelodysplasia as per recent bone marrow studies, immune thrombocytopenia, spontaneous bruising, fatigue, macrocytosis. Chronic bruising is due to the fact that patient is on Xarelto for his atrial fibrillation and has thrombocytopenia. He does not bleed from other sites including mucous membranes. Platelet function may also be altered due to the myelodysplasia.  Have ordered NORMA, B12 and folate, B2MG, CBC with auto differential, CMP, CRP, ferritin, haptoglobin, iron panel, LDH, manual differential, reticulocyte count, sed rate and uric acid. Venipuncture was performed in the office today. Patient was advised to call office to discuss results and further management.

## 2024-03-26 LAB
ALBUMIN SERPL ELPH-MCNC: 4.6 G/DL
ALP BLD-CCNC: 76 U/L
ALT SERPL-CCNC: 30 U/L
ANION GAP SERPL CALC-SCNC: 10 MMOL/L
ANISOCYTOSIS BLD QL: SLIGHT
AST SERPL-CCNC: 21 U/L
B2 MICROGLOB SERPL-MCNC: 2.2 MG/L
BASOPHILS # BLD AUTO: 0.08 K/UL
BASOPHILS # BLD AUTO: 0.08 K/UL
BASOPHILS NFR BLD AUTO: 1.6 %
BASOPHILS NFR BLD AUTO: 1.6 %
BILIRUB SERPL-MCNC: 1 MG/DL
BUN SERPL-MCNC: 11 MG/DL
CALCIUM SERPL-MCNC: 9.8 MG/DL
CHLORIDE SERPL-SCNC: 102 MMOL/L
CO2 SERPL-SCNC: 29 MMOL/L
CREAT SERPL-MCNC: 0.81 MG/DL
CRP SERPL-MCNC: <3 MG/L
DACRYOCYTES BLD QL SMEAR: SLIGHT
EGFR: 90 ML/MIN/1.73M2
EOSINOPHIL # BLD AUTO: 0 K/UL
EOSINOPHIL # BLD AUTO: 0 K/UL
EOSINOPHIL NFR BLD AUTO: 0 %
EOSINOPHIL NFR BLD AUTO: 0 %
FERRITIN SERPL-MCNC: 105 NG/ML
FOLATE SERPL-MCNC: 9.4 NG/ML
GIANT PLATELETS BLD QL SMEAR: PRESENT
GLUCOSE SERPL-MCNC: 83 MG/DL
HAPTOGLOB SERPL-MCNC: 60 MG/DL
HCT VFR BLD CALC: 43 %
HGB BLD-MCNC: 13.4 G/DL
HYPOCHROMIA BLD QL SMEAR: SLIGHT
IRON SATN MFR SERPL: 35 %
IRON SERPL-MCNC: 97 UG/DL
LDH SERPL-CCNC: 191 U/L
LYMPHOCYTES # BLD AUTO: 0.8 K/UL
LYMPHOCYTES # BLD AUTO: 0.8 K/UL
LYMPHOCYTES NFR BLD AUTO: 15.6 %
LYMPHOCYTES NFR BLD AUTO: 15.6 %
MACROCYTES BLD QL SMEAR: SLIGHT
MAN DIFF?: NORMAL
MCHC RBC-ENTMCNC: 31.2 GM/DL
MCHC RBC-ENTMCNC: 33.9 PG
MCV RBC AUTO: 108.9 FL
MONOCYTES # BLD AUTO: 0.5 K/UL
MONOCYTES # BLD AUTO: 0.5 K/UL
MONOCYTES NFR BLD AUTO: 9.8 %
MONOCYTES NFR BLD AUTO: 9.8 %
MSMEAR: NORMAL
NEUTROPHILS # BLD AUTO: 3.76 K/UL
NEUTROPHILS # BLD AUTO: 3.76 K/UL
NEUTROPHILS NFR BLD AUTO: 73 %
NEUTROPHILS NFR BLD AUTO: 73 %
NRBC # BLD MANUAL: 1 /100 WBCS
OVALOCYTES BLD QL SMEAR: SLIGHT
PLAT MORPH BLD: ABNORMAL
PLATELET # BLD AUTO: 55 K/UL
POIKILOCYTOSIS BLD QL SMEAR: SLIGHT
POLYCHROMASIA BLD QL SMEAR: SLIGHT
POTASSIUM SERPL-SCNC: 4.5 MMOL/L
PROT SERPL-MCNC: 7 G/DL
RBC # BLD: 3.95 M/UL
RBC # FLD: 14.7 %
RBC BLD AUTO: ABNORMAL
SODIUM SERPL-SCNC: 142 MMOL/L
SPHEROCYTES BLD QL SMEAR: SLIGHT
TIBC SERPL-MCNC: 279 UG/DL
UIBC SERPL-MCNC: 182 UG/DL
URATE SERPL-MCNC: 5.4 MG/DL
VIT B12 SERPL-MCNC: 843 PG/ML
WBC # FLD AUTO: 5.15 K/UL

## 2024-03-28 ENCOUNTER — NON-APPOINTMENT (OUTPATIENT)
Age: 79
End: 2024-03-28

## 2024-03-28 LAB
ANA PAT FLD IF-IMP: ABNORMAL
ANA SER IF-ACNC: ABNORMAL

## 2024-06-25 NOTE — ED PROVIDER NOTE - TOBACCO USE
Detail Level: Detailed
Quality 130: Documentation Of Current Medications In The Medical Record: Current Medications Documented
Quality 226: Preventive Care And Screening: Tobacco Use: Screening And Cessation Intervention: Patient screened for tobacco use and is an ex/non-smoker
Never smoker

## 2024-06-26 ENCOUNTER — APPOINTMENT (OUTPATIENT)
Dept: HEMATOLOGY ONCOLOGY | Facility: CLINIC | Age: 79
End: 2024-06-26
Payer: MEDICARE

## 2024-06-26 VITALS
HEIGHT: 66 IN | SYSTOLIC BLOOD PRESSURE: 128 MMHG | TEMPERATURE: 98 F | DIASTOLIC BLOOD PRESSURE: 64 MMHG | WEIGHT: 179 LBS | BODY MASS INDEX: 28.77 KG/M2 | OXYGEN SATURATION: 97 % | HEART RATE: 60 BPM

## 2024-06-26 DIAGNOSIS — D75.89 OTHER SPECIFIED DISEASES OF BLOOD AND BLOOD-FORMING ORGANS: ICD-10-CM

## 2024-06-26 DIAGNOSIS — R53.83 OTHER FATIGUE: ICD-10-CM

## 2024-06-26 DIAGNOSIS — D69.3 IMMUNE THROMBOCYTOPENIC PURPURA: ICD-10-CM

## 2024-06-26 DIAGNOSIS — R23.3 SPONTANEOUS ECCHYMOSES: ICD-10-CM

## 2024-06-26 DIAGNOSIS — R76.8 OTHER SPECIFIED ABNORMAL IMMUNOLOGICAL FINDINGS IN SERUM: ICD-10-CM

## 2024-06-26 DIAGNOSIS — D46.9 MYELODYSPLASTIC SYNDROME, UNSPECIFIED: ICD-10-CM

## 2024-06-26 LAB
ERYTHROCYTE [SEDIMENTATION RATE] IN BLOOD BY WESTERGREN METHOD: 11 MM/HR
RBC # BLD: 3.59 M/UL
RETICS # AUTO: 1.9 %
RETICS AGGREG/RBC NFR: 68.9 K/UL

## 2024-06-26 PROCEDURE — G2211 COMPLEX E/M VISIT ADD ON: CPT

## 2024-06-26 PROCEDURE — 36415 COLL VENOUS BLD VENIPUNCTURE: CPT

## 2024-06-26 PROCEDURE — 99214 OFFICE O/P EST MOD 30 MIN: CPT

## 2024-06-26 RX ORDER — HYDROXYCHLOROQUINE SULFATE 200 MG/1
200 TABLET, FILM COATED ORAL
Refills: 0 | Status: DISCONTINUED | COMMUNITY
End: 2024-06-26

## 2024-06-26 RX ORDER — RIVAROXABAN 20 MG/1
20 TABLET, FILM COATED ORAL
Refills: 0 | Status: ACTIVE | COMMUNITY

## 2024-06-27 LAB
ALBUMIN SERPL ELPH-MCNC: 4.9 G/DL
ALP BLD-CCNC: 80 U/L
ALT SERPL-CCNC: 27 U/L
ANION GAP SERPL CALC-SCNC: 14 MMOL/L
ANISOCYTOSIS BLD QL: SLIGHT
AST SERPL-CCNC: 21 U/L
B2 MICROGLOB SERPL-MCNC: 2.3 MG/L
BASOPHILS # BLD AUTO: 0 K/UL
BASOPHILS # BLD AUTO: 0 K/UL
BASOPHILS NFR BLD AUTO: 0 %
BASOPHILS NFR BLD AUTO: 0 %
BILIRUB SERPL-MCNC: 1 MG/DL
BUN SERPL-MCNC: 14 MG/DL
CALCIUM SERPL-MCNC: 9.8 MG/DL
CHLORIDE SERPL-SCNC: 103 MMOL/L
CO2 SERPL-SCNC: 23 MMOL/L
CREAT SERPL-MCNC: 0.77 MG/DL
CRP SERPL-MCNC: <3 MG/L
EGFR: 91 ML/MIN/1.73M2
EOSINOPHIL # BLD AUTO: 0 K/UL
EOSINOPHIL # BLD AUTO: 0 K/UL
EOSINOPHIL NFR BLD AUTO: 0 %
EOSINOPHIL NFR BLD AUTO: 0 %
FERRITIN SERPL-MCNC: 114 NG/ML
FOLATE SERPL-MCNC: 13.8 NG/ML
GIANT PLATELETS BLD QL SMEAR: PRESENT
GLUCOSE SERPL-MCNC: 93 MG/DL
HAPTOGLOB SERPL-MCNC: 46 MG/DL
HCT VFR BLD CALC: 39.3 %
HGB BLD-MCNC: 12.4 G/DL
HYPOCHROMIA BLD QL SMEAR: SLIGHT
IRON SATN MFR SERPL: 30 %
IRON SERPL-MCNC: 91 UG/DL
LDH SERPL-CCNC: 215 U/L
LYMPHOCYTES # BLD AUTO: 1.19 K/UL
LYMPHOCYTES # BLD AUTO: 1.19 K/UL
LYMPHOCYTES NFR BLD AUTO: 22.8 %
LYMPHOCYTES NFR BLD AUTO: 22.8 %
MACROCYTES BLD QL SMEAR: SLIGHT
MAN DIFF?: NORMAL
MCHC RBC-ENTMCNC: 31.6 GM/DL
MCHC RBC-ENTMCNC: 34.5 PG
MCV RBC AUTO: 109.5 FL
MONOCYTES # BLD AUTO: 0.14 K/UL
MONOCYTES # BLD AUTO: 0.14 K/UL
MONOCYTES NFR BLD AUTO: 2.6 %
MONOCYTES NFR BLD AUTO: 2.6 %
MSMEAR: NORMAL
NEUTROPHILS # BLD AUTO: 3.89 K/UL
NEUTROPHILS # BLD AUTO: 3.89 K/UL
NEUTROPHILS NFR BLD AUTO: 72.8 %
NEUTROPHILS NFR BLD AUTO: 72.8 %
NEUTS BAND NFR BLD MANUAL: 1.8 %
PLAT MORPH BLD: ABNORMAL
PLATELET # BLD AUTO: 46 K/UL
POIKILOCYTOSIS BLD QL SMEAR: SLIGHT
POLYCHROMASIA BLD QL SMEAR: SLIGHT
POTASSIUM SERPL-SCNC: 4.5 MMOL/L
PROT SERPL-MCNC: 7.3 G/DL
RBC # BLD: 3.59 M/UL
RBC # FLD: 15.6 %
RBC BLD AUTO: ABNORMAL
SODIUM SERPL-SCNC: 141 MMOL/L
SPHEROCYTES BLD QL SMEAR: SLIGHT
TIBC SERPL-MCNC: 304 UG/DL
UIBC SERPL-MCNC: 213 UG/DL
URATE SERPL-MCNC: 5.1 MG/DL
VIT B12 SERPL-MCNC: 950 PG/ML
WBC # FLD AUTO: 5.22 K/UL

## 2024-07-01 LAB
ANA PAT FLD IF-IMP: NORMAL
ANA SER IF-ACNC: ABNORMAL

## 2024-07-02 ENCOUNTER — NON-APPOINTMENT (OUTPATIENT)
Age: 79
End: 2024-07-02

## 2024-08-21 ENCOUNTER — APPOINTMENT (OUTPATIENT)
Dept: OPHTHALMOLOGY | Facility: CLINIC | Age: 79
End: 2024-08-21
Payer: MEDICARE

## 2024-08-21 ENCOUNTER — NON-APPOINTMENT (OUTPATIENT)
Age: 79
End: 2024-08-21

## 2024-08-21 PROCEDURE — 92133 CPTRZD OPH DX IMG PST SGM ON: CPT

## 2024-08-21 PROCEDURE — 92012 INTRM OPH EXAM EST PATIENT: CPT

## 2024-08-21 PROCEDURE — 92083 EXTENDED VISUAL FIELD XM: CPT

## 2024-09-09 ENCOUNTER — APPOINTMENT (OUTPATIENT)
Dept: OTOLARYNGOLOGY | Facility: CLINIC | Age: 79
End: 2024-09-09
Payer: MEDICARE

## 2024-09-09 VITALS
TEMPERATURE: 97.9 F | DIASTOLIC BLOOD PRESSURE: 76 MMHG | HEART RATE: 67 BPM | WEIGHT: 178 LBS | OXYGEN SATURATION: 100 % | SYSTOLIC BLOOD PRESSURE: 139 MMHG | HEIGHT: 66 IN | BODY MASS INDEX: 28.61 KG/M2

## 2024-09-09 DIAGNOSIS — R42 DIZZINESS AND GIDDINESS: ICD-10-CM

## 2024-09-09 DIAGNOSIS — D44.7 NEOPLASM OF UNCERTAIN BEHAVIOR OF AORTIC BODY AND OTHER PARAGANGLIA: ICD-10-CM

## 2024-09-09 PROCEDURE — 99213 OFFICE O/P EST LOW 20 MIN: CPT

## 2024-09-09 NOTE — ASSESSMENT
[FreeTextEntry1] : 1. glomus jugular tumor, R -Ct temporal bones w/, neck ct chest ct to evaluate lung lesion -RTC to discuss results he asked to hold on this until entire workup is completed on his wife - asst asked to expedite

## 2024-09-09 NOTE — PHYSICAL EXAM
[Midline] : trachea located in midline position [Normal] : no nystagmus [de-identified] : l nl; r scant dy blood in eac inf. [de-identified] : gait steady

## 2024-09-09 NOTE — HISTORY OF PRESENT ILLNESS
[de-identified] : 10 month follow up visit for this 77 y/o M with h/o r glomus jugular tumor s/p xrt x8 years ago. He is noticing increased dizziness. He has h/o sx of posnal vertigo which improve with v rehab. He is here with his wife. 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

## 2024-09-19 ENCOUNTER — APPOINTMENT (OUTPATIENT)
Dept: HEMATOLOGY ONCOLOGY | Facility: CLINIC | Age: 79
End: 2024-09-19
Payer: MEDICARE

## 2024-09-19 VITALS
OXYGEN SATURATION: 98 % | BODY MASS INDEX: 28.45 KG/M2 | TEMPERATURE: 97.8 F | HEIGHT: 66 IN | DIASTOLIC BLOOD PRESSURE: 62 MMHG | SYSTOLIC BLOOD PRESSURE: 122 MMHG | HEART RATE: 60 BPM | WEIGHT: 177 LBS

## 2024-09-19 DIAGNOSIS — R53.83 OTHER FATIGUE: ICD-10-CM

## 2024-09-19 DIAGNOSIS — D69.3 IMMUNE THROMBOCYTOPENIC PURPURA: ICD-10-CM

## 2024-09-19 DIAGNOSIS — U07.1 COVID-19: ICD-10-CM

## 2024-09-19 DIAGNOSIS — R23.3 SPONTANEOUS ECCHYMOSES: ICD-10-CM

## 2024-09-19 DIAGNOSIS — D75.89 OTHER SPECIFIED DISEASES OF BLOOD AND BLOOD-FORMING ORGANS: ICD-10-CM

## 2024-09-19 DIAGNOSIS — D46.9 MYELODYSPLASTIC SYNDROME, UNSPECIFIED: ICD-10-CM

## 2024-09-19 DIAGNOSIS — D72.820 LYMPHOCYTOSIS (SYMPTOMATIC): ICD-10-CM

## 2024-09-19 PROCEDURE — 36415 COLL VENOUS BLD VENIPUNCTURE: CPT

## 2024-09-19 PROCEDURE — G2211 COMPLEX E/M VISIT ADD ON: CPT

## 2024-09-19 PROCEDURE — 99214 OFFICE O/P EST MOD 30 MIN: CPT

## 2024-09-19 RX ORDER — SUCRALFATE 1 G/1
1 TABLET ORAL
Refills: 0 | Status: ACTIVE | COMMUNITY

## 2024-09-19 RX ORDER — HYDROXYCHLOROQUINE SULFATE 200 MG/1
200 TABLET, FILM COATED ORAL
Refills: 0 | Status: ACTIVE | COMMUNITY

## 2024-09-19 NOTE — ASSESSMENT
[FreeTextEntry1] : Patient is a 78 year old male with a history of multiple medical problems including recent Covid 19 who now presents for hematologic follow-up for Myelodysplasia as per bone marrow studies, immune thrombocytopenia, spontaneous bruising, fatigue, macrocytosis. Chronic bruising is due to the fact that patient is on Xarelto for his atrial fibrillation and has thrombocytopenia.   Have ordered B12 and folate, B2MG, CBC with auto differential, CMP, CRP, ferritin, haptoglobin, iron panel, LDH, manual differential, reticulocyte count, sed rate and uric acid. Venipuncture was performed in the office today. Patient was advised to call office to discuss results and further management. Pending results, patient will return in 2 to 3 months.

## 2024-09-19 NOTE — REASON FOR VISIT
[Follow-Up Visit] : a follow-up visit for [FreeTextEntry2] : Myelodysplasia, macrocytosis, immune thrombocytopenia, easy bruising, ecchymoses

## 2024-09-19 NOTE — PHYSICAL EXAM
[Normal] : affect appropriate [de-identified] : Occasional wheeze [de-identified] : pacemaker left anterior chest wall, RR. S1, S2, murmur  [de-identified] : ? ventral hernia [de-identified] : few fading  ecchymoses on upper extremities and trunk

## 2024-09-19 NOTE — HISTORY OF PRESENT ILLNESS
[de-identified] : Patient is a 78 year old male with a history of multiple medical problems including cardiac arrhythmia, neuropathy, IBS, vertigo, reflux, lupus, COVID-19 infection in July of 2024, who now presents for hematologic follow-up for immune thrombocytopenia, macrocytosis, MDS by bone marrow studies, easy bruising and fatigue. At the last visit in June, the CBC was significant for slightly lower hemoglobin at 12.4, hematocrit 39.3 and a platelet count that was lower at 46,000. This may have been due to the fact that patient had COVID-19 at the time and due to the episode of blood in the stool that patient reported at the visit. The white count was normal at 5.22 with a normal differential. Comprehensive metabolic panel, iron panel, sed rate, ferritin, LDH, uric acid, B12, folate, C-reactive protein, haptoglobin were all normal. Beta-2 microglobulin was stable at 2.3. Shortly after his last visit, patient tested positive for COVID-19 infection. Patient has deferred the colonoscopy for the time being. He denies blood in the urine, stool or from nose or mouth. Since the last visit, patient was restarted on Hydroxychloroquine.  He remains on Xarelto for his atrial fibrillation. Today, the patient feels fatigued and complains of abdominal bloating, urinary frequency, eye tearing, arthritic pains in hands and knees. He also complains of loss of hearing in the left ear and will have an audiogram performed on October 7th. He admits to bruising easily on the Xarelto. Patient continues to supplement vitamin B12 and D3. He remains active by exercise and his weight is stable. Denies fever, chills, chest pain, shortness of breath, skin rash, dizziness or unintentional weight loss.

## 2024-09-19 NOTE — REVIEW OF SYSTEMS
[Fatigue] : fatigue [Loss of Hearing] : loss of hearing [Easy Bruising] : a tendency for easy bruising [Negative] : Allergic/Immunologic [Fever] : no fever [Chills] : no chills [Night Sweats] : no night sweats [Recent Change In Weight] : ~T no recent weight change [Eye Pain] : no eye pain [Vision Problems] : no vision problems [Chest Pain] : no chest pain [Palpitations] : no palpitations [Shortness Of Breath] : no shortness of breath [Cough] : no cough [Abdominal Pain] : no abdominal pain [Joint Pain] : joint pain [Joint Stiffness] : joint stiffness [Skin Rash] : no skin rash [Dizziness] : no dizziness [Easy Bleeding] : no tendency for easy bleeding [FreeTextEntry3] : eyes tear [FreeTextEntry7] : Has bloating [FreeTextEntry4] : now losing hearing in left ear [FreeTextEntry8] : Urinary frequency [FreeTextEntry9] : Hands and knees [de-identified] : Bruises easily

## 2024-09-20 LAB
ALBUMIN SERPL ELPH-MCNC: 4.7 G/DL
ALP BLD-CCNC: 80 U/L
ALT SERPL-CCNC: 23 U/L
ANION GAP SERPL CALC-SCNC: 12 MMOL/L
ANISOCYTOSIS BLD QL: SLIGHT
AST SERPL-CCNC: 26 U/L
B2 MICROGLOB SERPL-MCNC: 2.3 MG/L
BASOPHILS # BLD AUTO: 0.09 K/UL
BASOPHILS # BLD AUTO: 0.09 K/UL
BASOPHILS NFR BLD AUTO: 1.7 %
BASOPHILS NFR BLD AUTO: 1.7 %
BILIRUB SERPL-MCNC: 0.8 MG/DL
BUN SERPL-MCNC: 15 MG/DL
CALCIUM SERPL-MCNC: 9.9 MG/DL
CHLORIDE SERPL-SCNC: 102 MMOL/L
CO2 SERPL-SCNC: 26 MMOL/L
CREAT SERPL-MCNC: 0.82 MG/DL
CRP SERPL-MCNC: <3 MG/L
DACRYOCYTES BLD QL SMEAR: SLIGHT
EGFR: 89 ML/MIN/1.73M2
EOSINOPHIL # BLD AUTO: 0 K/UL
EOSINOPHIL # BLD AUTO: 0 K/UL
EOSINOPHIL NFR BLD AUTO: 0 %
EOSINOPHIL NFR BLD AUTO: 0 %
ERYTHROCYTE [SEDIMENTATION RATE] IN BLOOD BY WESTERGREN METHOD: 8 MM/HR
FERRITIN SERPL-MCNC: 100 NG/ML
FOLATE SERPL-MCNC: 13.2 NG/ML
GIANT PLATELETS BLD QL SMEAR: PRESENT
GLUCOSE SERPL-MCNC: 88 MG/DL
HAPTOGLOB SERPL-MCNC: 42 MG/DL
HCT VFR BLD CALC: 41.3 %
HGB BLD-MCNC: 12.9 G/DL
HYPOCHROMIA BLD QL SMEAR: SLIGHT
IRON SATN MFR SERPL: 28 %
IRON SERPL-MCNC: 88 UG/DL
LDH SERPL-CCNC: 251 U/L
LYMPHOCYTES # BLD AUTO: 1.01 K/UL
LYMPHOCYTES # BLD AUTO: 1.01 K/UL
LYMPHOCYTES NFR BLD AUTO: 19.1 %
LYMPHOCYTES NFR BLD AUTO: 19.1 %
MACROCYTES BLD QL SMEAR: SLIGHT
MAN DIFF?: NORMAL
MCHC RBC-ENTMCNC: 31.2 GM/DL
MCHC RBC-ENTMCNC: 34.6 PG
MCV RBC AUTO: 110.7 FL
MONOCYTES # BLD AUTO: 0.32 K/UL
MONOCYTES # BLD AUTO: 0.32 K/UL
MONOCYTES NFR BLD AUTO: 6.1 %
MONOCYTES NFR BLD AUTO: 6.1 %
MSMEAR: NORMAL
NEUTROPHILS # BLD AUTO: 3.88 K/UL
NEUTROPHILS # BLD AUTO: 3.88 K/UL
NEUTROPHILS NFR BLD AUTO: 73.1 %
NEUTROPHILS NFR BLD AUTO: 73.1 %
PLAT MORPH BLD: ABNORMAL
PLATELET # BLD AUTO: 53 K/UL
POIKILOCYTOSIS BLD QL SMEAR: SLIGHT
POLYCHROMASIA BLD QL SMEAR: SLIGHT
POTASSIUM SERPL-SCNC: 4.6 MMOL/L
PROT SERPL-MCNC: 7.4 G/DL
RBC # BLD: 3.73 M/UL
RBC # BLD: 3.73 M/UL
RBC # FLD: 14.3 %
RBC BLD AUTO: ABNORMAL
RETICS # AUTO: 1.3 %
RETICS AGGREG/RBC NFR: 47.4 K/UL
SCHISTOCYTES BLD QL SMEAR: SLIGHT
SODIUM SERPL-SCNC: 140 MMOL/L
SPHEROCYTES BLD QL SMEAR: SLIGHT
TIBC SERPL-MCNC: 313 UG/DL
UIBC SERPL-MCNC: 225 UG/DL
URATE SERPL-MCNC: 5.9 MG/DL
VIT B12 SERPL-MCNC: 1096 PG/ML
WBC # FLD AUTO: 5.31 K/UL

## 2024-09-24 ENCOUNTER — NON-APPOINTMENT (OUTPATIENT)
Age: 79
End: 2024-09-24

## 2024-12-09 ENCOUNTER — APPOINTMENT (OUTPATIENT)
Dept: HEMATOLOGY ONCOLOGY | Facility: CLINIC | Age: 79
End: 2024-12-09
Payer: MEDICARE

## 2024-12-09 VITALS
OXYGEN SATURATION: 97 % | WEIGHT: 180 LBS | HEIGHT: 66 IN | BODY MASS INDEX: 28.93 KG/M2 | HEART RATE: 66 BPM | SYSTOLIC BLOOD PRESSURE: 134 MMHG | TEMPERATURE: 97.6 F | DIASTOLIC BLOOD PRESSURE: 68 MMHG

## 2024-12-09 DIAGNOSIS — D46.9 MYELODYSPLASTIC SYNDROME, UNSPECIFIED: ICD-10-CM

## 2024-12-09 DIAGNOSIS — D75.89 OTHER SPECIFIED DISEASES OF BLOOD AND BLOOD-FORMING ORGANS: ICD-10-CM

## 2024-12-09 DIAGNOSIS — D69.3 IMMUNE THROMBOCYTOPENIC PURPURA: ICD-10-CM

## 2024-12-09 DIAGNOSIS — R23.3 SPONTANEOUS ECCHYMOSES: ICD-10-CM

## 2024-12-09 DIAGNOSIS — R53.83 OTHER FATIGUE: ICD-10-CM

## 2024-12-09 PROCEDURE — 36415 COLL VENOUS BLD VENIPUNCTURE: CPT

## 2024-12-09 PROCEDURE — G2211 COMPLEX E/M VISIT ADD ON: CPT

## 2024-12-09 PROCEDURE — 99214 OFFICE O/P EST MOD 30 MIN: CPT

## 2024-12-10 LAB
ALBUMIN SERPL ELPH-MCNC: 4.6 G/DL
ALP BLD-CCNC: 82 U/L
ALT SERPL-CCNC: 31 U/L
ANION GAP SERPL CALC-SCNC: 12 MMOL/L
ANISOCYTOSIS BLD QL: SLIGHT
AST SERPL-CCNC: 24 U/L
B2 MICROGLOB SERPL-MCNC: 2.5 MG/L
BASOPHILS # BLD AUTO: 0.05 K/UL
BASOPHILS # BLD AUTO: 0.05 K/UL
BASOPHILS NFR BLD AUTO: 0.9 %
BASOPHILS NFR BLD AUTO: 0.9 %
BILIRUB SERPL-MCNC: 0.9 MG/DL
BUN SERPL-MCNC: 13 MG/DL
CALCIUM SERPL-MCNC: 9.7 MG/DL
CHLORIDE SERPL-SCNC: 103 MMOL/L
CO2 SERPL-SCNC: 27 MMOL/L
CREAT SERPL-MCNC: 0.82 MG/DL
CRP SERPL-MCNC: <3 MG/L
DACRYOCYTES BLD QL SMEAR: SLIGHT
EGFR: 89 ML/MIN/1.73M2
EOSINOPHIL # BLD AUTO: 0 K/UL
EOSINOPHIL # BLD AUTO: 0 K/UL
EOSINOPHIL NFR BLD AUTO: 0 %
EOSINOPHIL NFR BLD AUTO: 0 %
ERYTHROCYTE [SEDIMENTATION RATE] IN BLOOD BY WESTERGREN METHOD: 9 MM/HR
FERRITIN SERPL-MCNC: 93 NG/ML
FOLATE SERPL-MCNC: 15.4 NG/ML
GIANT PLATELETS BLD QL SMEAR: PRESENT
GLUCOSE SERPL-MCNC: 95 MG/DL
HAPTOGLOB SERPL-MCNC: 49 MG/DL
HCT VFR BLD CALC: 40.2 %
HGB BLD-MCNC: 12.6 G/DL
IRON SATN MFR SERPL: 31 %
IRON SERPL-MCNC: 92 UG/DL
LDH SERPL-CCNC: 219 U/L
LYMPHOCYTES # BLD AUTO: 0.87 K/UL
LYMPHOCYTES # BLD AUTO: 0.87 K/UL
LYMPHOCYTES NFR BLD AUTO: 16.6 %
LYMPHOCYTES NFR BLD AUTO: 16.6 %
MACROCYTES BLD QL SMEAR: SLIGHT
MAN DIFF?: NORMAL
MCHC RBC-ENTMCNC: 31.3 G/DL
MCHC RBC-ENTMCNC: 34.9 PG
MCV RBC AUTO: 111.4 FL
MONOCYTES # BLD AUTO: 0.23 K/UL
MONOCYTES # BLD AUTO: 0.23 K/UL
MONOCYTES NFR BLD AUTO: 4.4 %
MONOCYTES NFR BLD AUTO: 4.4 %
MSMEAR: NORMAL
NEUTROPHILS # BLD AUTO: 4.08 K/UL
NEUTROPHILS # BLD AUTO: 4.08 K/UL
NEUTROPHILS NFR BLD AUTO: 78.1 %
NEUTROPHILS NFR BLD AUTO: 78.1 %
OVALOCYTES BLD QL SMEAR: SLIGHT
PLAT MORPH BLD: ABNORMAL
PLATELET # BLD AUTO: 46 K/UL
POIKILOCYTOSIS BLD QL SMEAR: SLIGHT
POLYCHROMASIA BLD QL SMEAR: SLIGHT
POTASSIUM SERPL-SCNC: 4.2 MMOL/L
PROT SERPL-MCNC: 7 G/DL
RBC # BLD: 3.61 M/UL
RBC # BLD: 3.61 M/UL
RBC # FLD: 15.6 %
RBC BLD AUTO: ABNORMAL
RETICS # AUTO: 1.7 %
RETICS AGGREG/RBC NFR: 59.6 K/UL
SODIUM SERPL-SCNC: 143 MMOL/L
SPHEROCYTES BLD QL SMEAR: SLIGHT
TIBC SERPL-MCNC: 298 UG/DL
UIBC SERPL-MCNC: 206 UG/DL
URATE SERPL-MCNC: 4.8 MG/DL
VIT B12 SERPL-MCNC: 950 PG/ML
WBC # FLD AUTO: 5.22 K/UL

## 2024-12-12 ENCOUNTER — NON-APPOINTMENT (OUTPATIENT)
Age: 79
End: 2024-12-12

## 2024-12-12 LAB
ANA PAT FLD IF-IMP: ABNORMAL
ANA SER IF-ACNC: ABNORMAL

## 2025-01-15 ENCOUNTER — APPOINTMENT (OUTPATIENT)
Dept: OPHTHALMOLOGY | Facility: CLINIC | Age: 80
End: 2025-01-15

## 2025-01-30 ENCOUNTER — NON-APPOINTMENT (OUTPATIENT)
Age: 80
End: 2025-01-30

## 2025-01-30 ENCOUNTER — APPOINTMENT (OUTPATIENT)
Dept: OTOLARYNGOLOGY | Facility: CLINIC | Age: 80
End: 2025-01-30
Payer: MEDICARE

## 2025-01-30 VITALS
OXYGEN SATURATION: 98 % | WEIGHT: 170 LBS | SYSTOLIC BLOOD PRESSURE: 128 MMHG | BODY MASS INDEX: 27.32 KG/M2 | DIASTOLIC BLOOD PRESSURE: 68 MMHG | HEART RATE: 74 BPM | HEIGHT: 66 IN | TEMPERATURE: 98 F

## 2025-01-30 DIAGNOSIS — D44.7 NEOPLASM OF UNCERTAIN BEHAVIOR OF AORTIC BODY AND OTHER PARAGANGLIA: ICD-10-CM

## 2025-01-30 DIAGNOSIS — R51.9 HEADACHE, UNSPECIFIED: ICD-10-CM

## 2025-01-30 PROCEDURE — 99213 OFFICE O/P EST LOW 20 MIN: CPT

## 2025-02-03 ENCOUNTER — APPOINTMENT (OUTPATIENT)
Dept: OTOLARYNGOLOGY | Facility: CLINIC | Age: 80
End: 2025-02-03

## 2025-02-25 ENCOUNTER — APPOINTMENT (OUTPATIENT)
Dept: HEMATOLOGY ONCOLOGY | Facility: CLINIC | Age: 80
End: 2025-02-25
Payer: MEDICARE

## 2025-02-25 VITALS
OXYGEN SATURATION: 96 % | HEIGHT: 66 IN | HEART RATE: 72 BPM | BODY MASS INDEX: 28.77 KG/M2 | WEIGHT: 179 LBS | SYSTOLIC BLOOD PRESSURE: 124 MMHG | TEMPERATURE: 98 F | DIASTOLIC BLOOD PRESSURE: 76 MMHG

## 2025-02-25 DIAGNOSIS — R23.3 SPONTANEOUS ECCHYMOSES: ICD-10-CM

## 2025-02-25 DIAGNOSIS — D46.9 MYELODYSPLASTIC SYNDROME, UNSPECIFIED: ICD-10-CM

## 2025-02-25 DIAGNOSIS — R53.83 OTHER FATIGUE: ICD-10-CM

## 2025-02-25 DIAGNOSIS — D69.3 IMMUNE THROMBOCYTOPENIC PURPURA: ICD-10-CM

## 2025-02-25 DIAGNOSIS — R76.8 OTHER SPECIFIED ABNORMAL IMMUNOLOGICAL FINDINGS IN SERUM: ICD-10-CM

## 2025-02-25 DIAGNOSIS — D75.89 OTHER SPECIFIED DISEASES OF BLOOD AND BLOOD-FORMING ORGANS: ICD-10-CM

## 2025-02-25 LAB
ERYTHROCYTE [SEDIMENTATION RATE] IN BLOOD BY WESTERGREN METHOD: 8 MM/HR
RBC # BLD: 3.77 M/UL
RETICS # AUTO: 1.5 %
RETICS AGGREG/RBC NFR: 57.3 K/UL

## 2025-02-25 PROCEDURE — 99214 OFFICE O/P EST MOD 30 MIN: CPT

## 2025-02-25 PROCEDURE — 36415 COLL VENOUS BLD VENIPUNCTURE: CPT

## 2025-02-25 PROCEDURE — G2211 COMPLEX E/M VISIT ADD ON: CPT

## 2025-02-25 RX ORDER — FAMOTIDINE 40 MG/1
40 TABLET, FILM COATED ORAL
Refills: 0 | Status: ACTIVE | COMMUNITY

## 2025-02-25 RX ORDER — BISMUTH SUBSALICYLATE 525 MG/1
TABLET ORAL
Refills: 0 | Status: ACTIVE | COMMUNITY

## 2025-02-26 LAB
ALBUMIN SERPL ELPH-MCNC: 4.8 G/DL
ALP BLD-CCNC: 78 U/L
ALT SERPL-CCNC: 31 U/L
ANION GAP SERPL CALC-SCNC: 14 MMOL/L
ANISOCYTOSIS BLD QL: SLIGHT
AST SERPL-CCNC: 25 U/L
B2 MICROGLOB SERPL-MCNC: 2.2 MG/L
BASOPHILS # BLD AUTO: 0 K/UL
BASOPHILS # BLD AUTO: 0 K/UL
BASOPHILS NFR BLD AUTO: 0 %
BASOPHILS NFR BLD AUTO: 0 %
BILIRUB SERPL-MCNC: 0.9 MG/DL
BUN SERPL-MCNC: 18 MG/DL
CALCIUM SERPL-MCNC: 9.7 MG/DL
CHLORIDE SERPL-SCNC: 102 MMOL/L
CO2 SERPL-SCNC: 26 MMOL/L
CREAT SERPL-MCNC: 1.07 MG/DL
CRP SERPL-MCNC: <3 MG/L
DACRYOCYTES BLD QL SMEAR: SLIGHT
EGFR: 70 ML/MIN/1.73M2
EOSINOPHIL # BLD AUTO: 0 K/UL
EOSINOPHIL # BLD AUTO: 0 K/UL
EOSINOPHIL NFR BLD AUTO: 0 %
EOSINOPHIL NFR BLD AUTO: 0 %
FERRITIN SERPL-MCNC: 96 NG/ML
FOLATE SERPL-MCNC: 14.3 NG/ML
GLUCOSE SERPL-MCNC: 92 MG/DL
HAPTOGLOB SERPL-MCNC: 43 MG/DL
HCT VFR BLD CALC: 41.1 %
HGB BLD-MCNC: 13.4 G/DL
HYPOCHROMIA BLD QL SMEAR: SLIGHT
IRON SATN MFR SERPL: 31 %
IRON SERPL-MCNC: 94 UG/DL
LDH SERPL-CCNC: 236 U/L
LYMPHOCYTES # BLD AUTO: 1.63 K/UL
LYMPHOCYTES # BLD AUTO: 1.63 K/UL
LYMPHOCYTES NFR BLD AUTO: 23.5 %
LYMPHOCYTES NFR BLD AUTO: 23.5 %
MACROCYTES BLD QL SMEAR: SLIGHT
MAN DIFF?: NORMAL
MANUAL SMEAR: NORMAL
MCHC RBC-ENTMCNC: 32.6 G/DL
MCHC RBC-ENTMCNC: 35.5 PG
MCV RBC AUTO: 109 FL
MONOCYTES # BLD AUTO: 0.36 K/UL
MONOCYTES # BLD AUTO: 0.36 K/UL
MONOCYTES NFR BLD AUTO: 5.2 %
MONOCYTES NFR BLD AUTO: 5.2 %
NEUTROPHILS # BLD AUTO: 4.93 K/UL
NEUTROPHILS # BLD AUTO: 4.93 K/UL
NEUTROPHILS NFR BLD AUTO: 71.3 %
NEUTROPHILS NFR BLD AUTO: 71.3 %
OVALOCYTES BLD QL SMEAR: SLIGHT
PLAT MORPH BLD: ABNORMAL
PLATELET # BLD AUTO: 52 K/UL
POIKILOCYTOSIS BLD QL SMEAR: SLIGHT
POLYCHROMASIA BLD QL SMEAR: SLIGHT
POTASSIUM SERPL-SCNC: 4.6 MMOL/L
PROT SERPL-MCNC: 7.3 G/DL
RBC # BLD: 3.77 M/UL
RBC # FLD: 14.7 %
RBC BLD AUTO: ABNORMAL
SODIUM SERPL-SCNC: 142 MMOL/L
SPHEROCYTES BLD QL SMEAR: SLIGHT
TIBC SERPL-MCNC: 307 UG/DL
UIBC SERPL-MCNC: 213 UG/DL
URATE SERPL-MCNC: 6.1 MG/DL
VIT B12 SERPL-MCNC: 1154 PG/ML
WBC # FLD AUTO: 6.92 K/UL

## 2025-03-06 ENCOUNTER — NON-APPOINTMENT (OUTPATIENT)
Age: 80
End: 2025-03-06

## 2025-05-14 ENCOUNTER — APPOINTMENT (OUTPATIENT)
Dept: HEMATOLOGY ONCOLOGY | Facility: CLINIC | Age: 80
End: 2025-05-14

## 2025-05-14 VITALS
WEIGHT: 172 LBS | OXYGEN SATURATION: 97 % | BODY MASS INDEX: 27.64 KG/M2 | HEIGHT: 66 IN | SYSTOLIC BLOOD PRESSURE: 108 MMHG | HEART RATE: 74 BPM | DIASTOLIC BLOOD PRESSURE: 56 MMHG | TEMPERATURE: 96.9 F

## 2025-05-14 DIAGNOSIS — D75.89 OTHER SPECIFIED DISEASES OF BLOOD AND BLOOD-FORMING ORGANS: ICD-10-CM

## 2025-05-14 DIAGNOSIS — D69.6 THROMBOCYTOPENIA, UNSPECIFIED: ICD-10-CM

## 2025-05-14 DIAGNOSIS — R23.3 SPONTANEOUS ECCHYMOSES: ICD-10-CM

## 2025-05-14 DIAGNOSIS — D46.9 MYELODYSPLASTIC SYNDROME, UNSPECIFIED: ICD-10-CM

## 2025-05-14 DIAGNOSIS — D69.3 IMMUNE THROMBOCYTOPENIC PURPURA: ICD-10-CM

## 2025-05-14 DIAGNOSIS — R53.83 OTHER FATIGUE: ICD-10-CM

## 2025-05-14 PROCEDURE — G2211 COMPLEX E/M VISIT ADD ON: CPT

## 2025-05-14 PROCEDURE — 36415 COLL VENOUS BLD VENIPUNCTURE: CPT

## 2025-05-14 PROCEDURE — 99214 OFFICE O/P EST MOD 30 MIN: CPT

## 2025-05-14 RX ORDER — SACCHAROMYCES BOULARDII 50 MG
CAPSULE ORAL
Refills: 0 | Status: ACTIVE | COMMUNITY

## 2025-05-15 LAB
ALBUMIN SERPL ELPH-MCNC: 4.7 G/DL
ALP BLD-CCNC: 79 U/L
ALT SERPL-CCNC: 27 U/L
ANION GAP SERPL CALC-SCNC: 21 MMOL/L
ANISOCYTOSIS BLD QL: SLIGHT
AST SERPL-CCNC: 25 U/L
B2 MICROGLOB SERPL-MCNC: 2.3 MG/L
BILIRUB SERPL-MCNC: 0.8 MG/DL
BUN SERPL-MCNC: 11 MG/DL
CALCIUM SERPL-MCNC: 10 MG/DL
CHLORIDE SERPL-SCNC: 102 MMOL/L
CO2 SERPL-SCNC: 20 MMOL/L
CREAT SERPL-MCNC: 0.82 MG/DL
CRP SERPL-MCNC: 7 MG/L
DACRYOCYTES BLD QL SMEAR: SLIGHT
EGFRCR SERPLBLD CKD-EPI 2021: 89 ML/MIN/1.73M2
ERYTHROCYTE [SEDIMENTATION RATE] IN BLOOD BY WESTERGREN METHOD: 10 MM/HR
FERRITIN SERPL-MCNC: 123 NG/ML
FOLATE SERPL-MCNC: 12.9 NG/ML
GIANT PLATELETS BLD QL SMEAR: PRESENT
GLUCOSE SERPL-MCNC: 90 MG/DL
HAPTOGLOB SERPL-MCNC: 71 MG/DL
HCT VFR BLD CALC: 41.3 %
HGB BLD-MCNC: 13.7 G/DL
HYPOCHROMIA BLD QL SMEAR: SLIGHT
IRON SATN MFR SERPL: 30 %
IRON SERPL-MCNC: 81 UG/DL
LDH SERPL-CCNC: 231 U/L
LYMPHOCYTES # BLD AUTO: 1.24 K/UL
LYMPHOCYTES # BLD AUTO: 1.24 K/UL
MACROCYTES BLD QL SMEAR: SLIGHT
MAN DIFF?: NORMAL
MANUAL SMEAR: NORMAL
MCHC RBC-ENTMCNC: 33.2 G/DL
MCHC RBC-ENTMCNC: 36.1 PG
MCV RBC AUTO: 108.7 FL
NEUTROPHILS # BLD AUTO: 3.92 K/UL
NEUTROPHILS # BLD AUTO: 3.92 K/UL
OVALOCYTES BLD QL SMEAR: SLIGHT
PLAT MORPH BLD: ABNORMAL
PLATELET # BLD AUTO: 58 K/UL
POIKILOCYTOSIS BLD QL SMEAR: SLIGHT
POLYCHROMASIA BLD QL SMEAR: SLIGHT
POTASSIUM SERPL-SCNC: 4.7 MMOL/L
PROT SERPL-MCNC: 7.2 G/DL
RBC # BLD: 3.8 M/UL
RBC # BLD: 3.8 M/UL
RBC # FLD: 14.9 %
RBC BLD AUTO: ABNORMAL
RETICS # AUTO: 1.5 %
RETICS AGGREG/RBC NFR: 55.5 K/UL
SODIUM SERPL-SCNC: 142 MMOL/L
TIBC SERPL-MCNC: 275 UG/DL
UIBC SERPL-MCNC: 193 UG/DL
URATE SERPL-MCNC: 5 MG/DL
VIT B12 SERPL-MCNC: 1155 PG/ML
WBC # FLD AUTO: 5.35 K/UL

## 2025-05-21 ENCOUNTER — NON-APPOINTMENT (OUTPATIENT)
Age: 80
End: 2025-05-21

## 2025-07-31 ENCOUNTER — APPOINTMENT (OUTPATIENT)
Dept: HEMATOLOGY ONCOLOGY | Facility: CLINIC | Age: 80
End: 2025-07-31
Payer: MEDICARE

## 2025-07-31 VITALS
TEMPERATURE: 96.9 F | DIASTOLIC BLOOD PRESSURE: 62 MMHG | HEIGHT: 66 IN | BODY MASS INDEX: 27.64 KG/M2 | HEART RATE: 70 BPM | SYSTOLIC BLOOD PRESSURE: 132 MMHG | OXYGEN SATURATION: 96 % | WEIGHT: 172 LBS

## 2025-07-31 DIAGNOSIS — R23.3 SPONTANEOUS ECCHYMOSES: ICD-10-CM

## 2025-07-31 DIAGNOSIS — D69.3 IMMUNE THROMBOCYTOPENIC PURPURA: ICD-10-CM

## 2025-07-31 DIAGNOSIS — D46.9 MYELODYSPLASTIC SYNDROME, UNSPECIFIED: ICD-10-CM

## 2025-07-31 DIAGNOSIS — R53.83 OTHER FATIGUE: ICD-10-CM

## 2025-07-31 DIAGNOSIS — D75.89 OTHER SPECIFIED DISEASES OF BLOOD AND BLOOD-FORMING ORGANS: ICD-10-CM

## 2025-07-31 PROCEDURE — 99214 OFFICE O/P EST MOD 30 MIN: CPT

## 2025-07-31 PROCEDURE — G2211 COMPLEX E/M VISIT ADD ON: CPT

## 2025-07-31 PROCEDURE — 36415 COLL VENOUS BLD VENIPUNCTURE: CPT

## 2025-08-01 LAB
ALBUMIN SERPL ELPH-MCNC: 4.6 G/DL
ALP BLD-CCNC: 76 U/L
ALT SERPL-CCNC: 37 U/L
ANION GAP SERPL CALC-SCNC: 14 MMOL/L
ANISOCYTOSIS BLD QL: SLIGHT
AST SERPL-CCNC: 28 U/L
AUTO BASOPHILS #: 0.02 K/UL
AUTO BASOPHILS %: 0.3 %
AUTO EOSINOPHILS #: 0.02 K/UL
AUTO EOSINOPHILS %: 0.3 %
AUTO IMMATURE GRANULOCYTES #: 0.04 K/UL
AUTO LYMPHOCYTES #: 1.66 K/UL
AUTO LYMPHOCYTES %: 28 %
AUTO MONOCYTES #: 0.36 K/UL
AUTO MONOCYTES %: 6.1 %
AUTO NEUTROPHILS #: 3.82 K/UL
AUTO NEUTROPHILS %: 64.6 %
AUTO NRBC #: 0 K/UL
B2 MICROGLOB SERPL-MCNC: 2.4 MG/L
BILIRUB SERPL-MCNC: 1.1 MG/DL
BUN SERPL-MCNC: 14 MG/DL
CALCIUM SERPL-MCNC: 9.9 MG/DL
CHLORIDE SERPL-SCNC: 101 MMOL/L
CO2 SERPL-SCNC: 25 MMOL/L
CREAT SERPL-MCNC: 0.81 MG/DL
CRP SERPL-MCNC: <3 MG/L
EGFRCR SERPLBLD CKD-EPI 2021: 89 ML/MIN/1.73M2
ERYTHROCYTE [SEDIMENTATION RATE] IN BLOOD BY WESTERGREN METHOD: 7 MM/HR
FERRITIN SERPL-MCNC: 93 NG/ML
FOLATE SERPL-MCNC: 11.2 NG/ML
GIANT PLATELETS BLD QL SMEAR: PRESENT
GLUCOSE SERPL-MCNC: 80 MG/DL
HAPTOGLOB SERPL-MCNC: 50 MG/DL
HCT VFR BLD CALC: 42.3 %
HGB BLD-MCNC: 13.6 G/DL
HYPOCHROMIA BLD QL SMEAR: SLIGHT
IMM GRANULOCYTES NFR BLD AUTO: 0.7 %
IMMATURE PLATELET FRACTION #: 4.9 K/UL
IMMATURE PLATELET FRACTION %: 8.5 %
IMMATURE RETICULOCYTE FRACTION %: 7.1 %
IRON SATN MFR SERPL: 41 %
IRON SERPL-MCNC: 120 UG/DL
LDH SERPL-CCNC: 222 U/L
MACROCYTES BLD QL SMEAR: SLIGHT
MAN DIFF?: NORMAL
MANUAL BASOPHILS #: 0.05 K/UL
MANUAL BASOPHILS %: 0.8 %
MANUAL EOSINOPHILS #: 0.2 K/UL
MANUAL EOSINOPHILS %: 3.3 %
MANUAL LYMPHOCYTES #: 1.23 K/UL
MANUAL LYMPHOCYTES %: 20.8 %
MANUAL MONOCYTES #: 0.34 K/UL
MANUAL MONOCYTES %: 5.8 %
MANUAL NEUTROPHILS #: 4.1 K/UL
MANUAL NEUTROPHILS %: 69.3 %
MCHC RBC-ENTMCNC: 32.2 G/DL
MCHC RBC-ENTMCNC: 34.9 PG
MCV RBC AUTO: 108.5 FL
PLAT MORPH BLD: ABNORMAL
PLATELET # BLD AUTO: 58 K/UL
PLATELET ESTIMATE: ABNORMAL
PMV BLD AUTO: 0 /100 WBCS
PMV BLD: 11.6 FL
POLYCHROMASIA BLD QL SMEAR: SLIGHT
POTASSIUM SERPL-SCNC: 4.3 MMOL/L
PROT SERPL-MCNC: 7.3 G/DL
RBC # BLD: 3.9 M/UL
RBC # BLD: 3.9 M/UL
RBC # FLD: 14.6 %
RBC BLD AUTO: NORMAL
RETICS # AUTO: 1.7 %
RETICS AGGREG/RBC NFR: 65.1 K/UL
RETICULOCYTE HEMOGLOBIN EQUIVALENT: 38 PG
SODIUM SERPL-SCNC: 139 MMOL/L
TIBC SERPL-MCNC: 293 UG/DL
UIBC SERPL-MCNC: 173 UG/DL
URATE SERPL-MCNC: 5.3 MG/DL
VIT B12 SERPL-MCNC: 1047 PG/ML
WBC # FLD AUTO: 5.92 K/UL

## 2025-08-04 ENCOUNTER — NON-APPOINTMENT (OUTPATIENT)
Age: 80
End: 2025-08-04

## 2025-08-04 ENCOUNTER — APPOINTMENT (OUTPATIENT)
Dept: HEART AND VASCULAR | Facility: CLINIC | Age: 80
End: 2025-08-04
Payer: MEDICARE

## 2025-08-04 VITALS
BODY MASS INDEX: 27.48 KG/M2 | HEIGHT: 66 IN | HEART RATE: 65 BPM | WEIGHT: 171 LBS | SYSTOLIC BLOOD PRESSURE: 130 MMHG | DIASTOLIC BLOOD PRESSURE: 61 MMHG | TEMPERATURE: 97.9 F

## 2025-08-04 PROCEDURE — 93280 PM DEVICE PROGR EVAL DUAL: CPT

## 2025-08-04 PROCEDURE — 99213 OFFICE O/P EST LOW 20 MIN: CPT | Mod: 25

## 2025-08-11 ENCOUNTER — NON-APPOINTMENT (OUTPATIENT)
Age: 80
End: 2025-08-11